# Patient Record
Sex: MALE | Race: WHITE | NOT HISPANIC OR LATINO | Employment: UNEMPLOYED | URBAN - METROPOLITAN AREA
[De-identification: names, ages, dates, MRNs, and addresses within clinical notes are randomized per-mention and may not be internally consistent; named-entity substitution may affect disease eponyms.]

---

## 2020-09-16 NOTE — PROGRESS NOTES
"  SUBJECTIVE:    Patient ID: Michael Hart is a 40 y.o. male.    Chief Complaint: Establish Care    HPI     New patient to my service    At age 17 this gentleman had Hodgkins lymphoma-removed (pericardium) lesion was 11 cm long and wrapped around the phrenic nerve -went to [place] and received treatment-with chemo and radiation-He has done very well since-Pt states he feels his health has been "ok" -he notes when exercising his "heart beats fast" and he becomes somewhat winded ever since his treatment-however, actually did well in 70 mile bike ride-( came out in front)    He is a - getting ready to do a [id number] mile sailing trip solo -needs evaluation  before leaving-    Patient is very active and regularly works out-and now he is working out in his home but less so since his GF  has been ill-     No visits with results within 6 Month(s) from this visit.   Latest known visit with results is:   No results found for any previous visit.       Past Medical History:   Diagnosis Date    Hodgkin lymphoma 1998    Hypothyroidism      Past Surgical History:   Procedure Laterality Date    RHINOPLASTY      TONSILLECTOMY      turmor removed from chest       Family History   Problem Relation Age of Onset    Heart disease Mother     No Known Problems Father     No Known Problems Sister     No Known Problems Brother        Marital Status: Single  Alcohol History:  reports current alcohol use of about 1.0 standard drinks of alcohol per week.  Tobacco History:  reports that he has never smoked. He has never used smokeless tobacco.  Drug History:  reports no history of drug use.    Review of patient's allergies indicates:   Allergen Reactions    Ciprofloxacin Hives and Itching       Current Outpatient Medications:     levothyroxine (SYNTHROID) 88 MCG tablet, Take 88 mcg by mouth once daily., Disp: , Rfl:     Review of Systems   Constitutional: Positive for activity change (usually works out in home) and " "appetite change. Negative for chills, diaphoresis, fatigue, fever and unexpected weight change.   HENT: Negative for congestion, ear pain, hearing loss, nosebleeds, postnasal drip, sinus pressure, sinus pain, sneezing, sore throat and trouble swallowing.    Eyes: Negative for photophobia, pain and visual disturbance.   Respiratory: Positive for shortness of breath (hard cycling). Negative for apnea, cough, choking, chest tightness and wheezing.    Cardiovascular: Negative for chest pain, palpitations and leg swelling.        Recently found aortic murmur   Gastrointestinal: Positive for rectal pain (hemorrhoids -treated-hx colon polyp). Negative for abdominal distention, abdominal pain, blood in stool, constipation, diarrhea, nausea and vomiting.   Endocrine: Negative for cold intolerance, heat intolerance, polydipsia and polyuria.   Genitourinary: Negative for difficulty urinating, dysuria, flank pain, frequency, hematuria and urgency.   Musculoskeletal: Negative for arthralgias, back pain, joint swelling, myalgias, neck pain and neck stiffness.   Skin: Positive for rash (ectopic allergy recently treated with 6 months prednisone). Negative for pallor.   Allergic/Immunologic: Positive for environmental allergies (poison ivy). Negative for food allergies.   Neurological: Negative for dizziness, tremors, seizures, syncope, speech difficulty, weakness, light-headedness, numbness and headaches.   Hematological: Does not bruise/bleed easily.   Psychiatric/Behavioral: Negative for agitation, confusion, decreased concentration, sleep disturbance and suicidal ideas. The patient is not nervous/anxious.           Objective:      Vitals:    09/17/20 1707   BP: 118/82   Pulse: 78   Resp: 16   Temp: 98.1 °F (36.7 °C)   SpO2: 98%   Weight: 96.6 kg (213 lb)   Height: 6' 2" (1.88 m)     Physical Exam  Vitals signs and nursing note reviewed.   Constitutional:       General: He is not in acute distress.     Appearance: Normal " appearance. He is well-developed and normal weight. He is not ill-appearing, toxic-appearing or diaphoretic.   HENT:      Head: Normocephalic and atraumatic.      Right Ear: External ear normal.      Left Ear: External ear normal.      Nose: Nose normal.      Mouth/Throat:      Pharynx: Uvula midline.   Eyes:      General: No scleral icterus.        Right eye: No discharge.         Left eye: No discharge.      Conjunctiva/sclera: Conjunctivae normal.      Pupils: Pupils are equal, round, and reactive to light.      Right eye: Pupil is round and reactive.      Left eye: Pupil is round and reactive.   Neck:      Musculoskeletal: Normal range of motion and neck supple. No neck rigidity or muscular tenderness.      Thyroid: No thyromegaly.      Vascular: No carotid bruit or JVD.   Cardiovascular:      Rate and Rhythm: Normal rate and regular rhythm.      Heart sounds: Murmur (1/6 murmur to the left of the sternal border that radiates minimally to the r clavicle but is loudest along the left LSB and heard to the apex) present. No friction rub. No gallop.    Pulmonary:      Effort: Pulmonary effort is normal. No respiratory distress.      Breath sounds: Normal breath sounds. No stridor. No wheezing, rhonchi or rales.   Abdominal:      General: Abdomen is flat. Bowel sounds are normal. There is no distension or abdominal bruit.      Palpations: Abdomen is soft. Abdomen is not rigid. There is no mass.      Tenderness: There is no abdominal tenderness. There is no right CVA tenderness, left CVA tenderness, guarding or rebound.      Hernia: No hernia is present.   Musculoskeletal: Normal range of motion.         General: No swelling, tenderness, deformity or signs of injury.      Right lower leg: No edema.      Left lower leg: No edema.   Lymphadenopathy:      Cervical: No cervical adenopathy.   Skin:     General: Skin is warm and dry.      Capillary Refill: Capillary refill takes less than 2 seconds.      Coloration: Skin is  not jaundiced or pale.      Findings: No bruising, erythema, lesion or rash.      Nails: There is no clubbing.     Neurological:      General: No focal deficit present.      Mental Status: He is alert and oriented to person, place, and time. Mental status is at baseline.      Cranial Nerves: No cranial nerve deficit.      Sensory: No sensory deficit.      Motor: No weakness.      Coordination: Coordination normal.      Gait: Gait normal.      Deep Tendon Reflexes: Reflexes normal.   Psychiatric:         Mood and Affect: Mood normal.         Speech: Speech normal.         Behavior: Behavior normal. Behavior is cooperative.         Thought Content: Thought content normal.         Judgment: Judgment normal.           Assessment:       1. Routine general medical examination at a health care facility    2. Aortic valve sclerosis    3. Need for hepatitis C screening test         Plan:       Routine general medical examination at a health care facility    Aortic valve sclerosis    Need for hepatitis C screening test      No follow-ups on file.        9/17/2020 Brittany Neves M.D.

## 2020-09-17 ENCOUNTER — OFFICE VISIT (OUTPATIENT)
Dept: FAMILY MEDICINE | Facility: CLINIC | Age: 40
End: 2020-09-17
Payer: COMMERCIAL

## 2020-09-17 VITALS
SYSTOLIC BLOOD PRESSURE: 118 MMHG | WEIGHT: 213 LBS | RESPIRATION RATE: 16 BRPM | BODY MASS INDEX: 27.34 KG/M2 | DIASTOLIC BLOOD PRESSURE: 82 MMHG | HEIGHT: 74 IN | OXYGEN SATURATION: 98 % | TEMPERATURE: 98 F | HEART RATE: 78 BPM

## 2020-09-17 DIAGNOSIS — Z00.00 ROUTINE GENERAL MEDICAL EXAMINATION AT A HEALTH CARE FACILITY: Primary | ICD-10-CM

## 2020-09-17 DIAGNOSIS — Z85.71 HISTORY OF HODGKIN'S LYMPHOMA: ICD-10-CM

## 2020-09-17 DIAGNOSIS — I35.8 AORTIC VALVE SCLEROSIS: ICD-10-CM

## 2020-09-17 DIAGNOSIS — R06.02 SHORTNESS OF BREATH: ICD-10-CM

## 2020-09-17 DIAGNOSIS — Z11.59 NEED FOR HEPATITIS C SCREENING TEST: ICD-10-CM

## 2020-09-17 DIAGNOSIS — E03.9 HYPOTHYROIDISM (ACQUIRED): ICD-10-CM

## 2020-09-17 PROCEDURE — 3008F BODY MASS INDEX DOCD: CPT | Mod: S$GLB,,, | Performed by: INTERNAL MEDICINE

## 2020-09-17 PROCEDURE — 99386 PR PREVENTIVE VISIT,NEW,40-64: ICD-10-PCS | Mod: S$GLB,,, | Performed by: INTERNAL MEDICINE

## 2020-09-17 PROCEDURE — 99386 PREV VISIT NEW AGE 40-64: CPT | Mod: S$GLB,,, | Performed by: INTERNAL MEDICINE

## 2020-09-17 PROCEDURE — 3008F PR BODY MASS INDEX (BMI) DOCUMENTED: ICD-10-PCS | Mod: S$GLB,,, | Performed by: INTERNAL MEDICINE

## 2020-09-17 RX ORDER — LEVOTHYROXINE SODIUM 88 UG/1
88 TABLET ORAL DAILY
COMMUNITY
Start: 2020-06-17 | End: 2021-10-26

## 2020-09-24 ENCOUNTER — TELEPHONE (OUTPATIENT)
Dept: CARDIOLOGY | Facility: CLINIC | Age: 40
End: 2020-09-24

## 2020-09-24 DIAGNOSIS — C81.90 HODGKIN LYMPHOMA, UNSPECIFIED HODGKIN LYMPHOMA TYPE, UNSPECIFIED BODY REGION: ICD-10-CM

## 2020-09-24 DIAGNOSIS — R07.9 CHEST PAIN: ICD-10-CM

## 2020-09-24 DIAGNOSIS — E03.9 HYPOTHYROIDISM, UNSPECIFIED TYPE: Primary | ICD-10-CM

## 2020-09-24 DIAGNOSIS — R53.83 FATIGUE, UNSPECIFIED TYPE: ICD-10-CM

## 2020-09-25 ENCOUNTER — LAB VISIT (OUTPATIENT)
Dept: LAB | Facility: HOSPITAL | Age: 40
End: 2020-09-25
Attending: SPECIALIST
Payer: COMMERCIAL

## 2020-09-25 DIAGNOSIS — E03.9 HYPOTHYROIDISM, UNSPECIFIED TYPE: ICD-10-CM

## 2020-09-25 DIAGNOSIS — R53.83 FATIGUE, UNSPECIFIED TYPE: ICD-10-CM

## 2020-09-25 DIAGNOSIS — C81.90 HODGKIN LYMPHOMA, UNSPECIFIED HODGKIN LYMPHOMA TYPE, UNSPECIFIED BODY REGION: ICD-10-CM

## 2020-09-25 LAB
ERYTHROCYTE [SEDIMENTATION RATE] IN BLOOD BY WESTERGREN METHOD: 5 MM/HR (ref 0–10)
T4 FREE SERPL-MCNC: 0.76 NG/DL (ref 0.71–1.51)

## 2020-09-25 PROCEDURE — 84439 ASSAY OF FREE THYROXINE: CPT

## 2020-09-25 PROCEDURE — 85651 RBC SED RATE NONAUTOMATED: CPT

## 2020-09-25 PROCEDURE — 36415 COLL VENOUS BLD VENIPUNCTURE: CPT

## 2020-09-29 ENCOUNTER — HOSPITAL ENCOUNTER (OUTPATIENT)
Dept: PULMONOLOGY | Facility: HOSPITAL | Age: 40
Discharge: HOME | End: 2020-09-29
Attending: INTERNAL MEDICINE
Payer: COMMERCIAL

## 2020-09-29 ENCOUNTER — HOSPITAL ENCOUNTER (OUTPATIENT)
Dept: RADIOLOGY | Facility: HOSPITAL | Age: 40
Discharge: HOME | End: 2020-09-29
Attending: INTERNAL MEDICINE
Payer: COMMERCIAL

## 2020-09-29 DIAGNOSIS — Z85.71 HISTORY OF HODGKIN'S LYMPHOMA: ICD-10-CM

## 2020-09-29 DIAGNOSIS — R06.02 SHORTNESS OF BREATH: ICD-10-CM

## 2020-09-29 PROCEDURE — 94729 DIFFUSING CAPACITY: CPT

## 2020-09-29 PROCEDURE — 94727 GAS DIL/WSHOT DETER LNG VOL: CPT

## 2020-09-29 PROCEDURE — 71046 X-RAY EXAM CHEST 2 VIEWS: CPT | Mod: TC

## 2020-09-29 PROCEDURE — 94010 BREATHING CAPACITY TEST: CPT

## 2020-09-30 ENCOUNTER — TELEPHONE (OUTPATIENT)
Dept: FAMILY MEDICINE | Facility: CLINIC | Age: 40
End: 2020-09-30

## 2020-09-30 NOTE — TELEPHONE ENCOUNTER
----- Message from Brittany Neves MD sent at 9/29/2020  9:38 PM CDT -----  Test results are normal for cxr

## 2020-10-29 ENCOUNTER — LAB VISIT (OUTPATIENT)
Dept: LAB | Facility: HOSPITAL | Age: 40
End: 2020-10-29
Attending: INTERNAL MEDICINE
Payer: COMMERCIAL

## 2020-10-29 DIAGNOSIS — E03.9 HYPOTHYROIDISM (ACQUIRED): ICD-10-CM

## 2020-10-29 DIAGNOSIS — Z11.59 NEED FOR HEPATITIS C SCREENING TEST: ICD-10-CM

## 2020-10-29 DIAGNOSIS — Z00.00 ROUTINE GENERAL MEDICAL EXAMINATION AT A HEALTH CARE FACILITY: ICD-10-CM

## 2020-10-29 LAB
ALBUMIN SERPL BCP-MCNC: 4.5 G/DL (ref 3.5–5.2)
ALP SERPL-CCNC: 44 U/L (ref 55–135)
ALT SERPL W/O P-5'-P-CCNC: 22 U/L (ref 10–44)
ANION GAP SERPL CALC-SCNC: 11 MMOL/L (ref 8–16)
AST SERPL-CCNC: 21 U/L (ref 10–40)
BASOPHILS # BLD AUTO: 0.05 K/UL (ref 0–0.2)
BASOPHILS NFR BLD: 1.1 % (ref 0–1.9)
BILIRUB SERPL-MCNC: 0.6 MG/DL (ref 0.1–1)
BILIRUB UR QL STRIP: NEGATIVE
BUN SERPL-MCNC: 9 MG/DL (ref 6–20)
CALCIUM SERPL-MCNC: 9.8 MG/DL (ref 8.7–10.5)
CHLORIDE SERPL-SCNC: 101 MMOL/L (ref 95–110)
CHOLEST SERPL-MCNC: 204 MG/DL (ref 120–199)
CHOLEST/HDLC SERPL: 4.3 {RATIO} (ref 2–5)
CLARITY UR: CLEAR
CO2 SERPL-SCNC: 27 MMOL/L (ref 23–29)
COLOR UR: YELLOW
CREAT SERPL-MCNC: 0.9 MG/DL (ref 0.5–1.4)
DIFFERENTIAL METHOD: ABNORMAL
EOSINOPHIL # BLD AUTO: 0.1 K/UL (ref 0–0.5)
EOSINOPHIL NFR BLD: 2.4 % (ref 0–8)
ERYTHROCYTE [DISTWIDTH] IN BLOOD BY AUTOMATED COUNT: 11.9 % (ref 11.5–14.5)
EST. GFR  (AFRICAN AMERICAN): >60 ML/MIN/1.73 M^2
EST. GFR  (NON AFRICAN AMERICAN): >60 ML/MIN/1.73 M^2
GLUCOSE SERPL-MCNC: 99 MG/DL (ref 70–110)
GLUCOSE UR QL STRIP: NEGATIVE
HCT VFR BLD AUTO: 45 % (ref 40–54)
HDLC SERPL-MCNC: 48 MG/DL (ref 40–75)
HDLC SERPL: 23.5 % (ref 20–50)
HGB BLD-MCNC: 15.6 G/DL (ref 14–18)
HGB UR QL STRIP: NEGATIVE
IMM GRANULOCYTES # BLD AUTO: 0.01 K/UL (ref 0–0.04)
IMM GRANULOCYTES NFR BLD AUTO: 0.2 % (ref 0–0.5)
KETONES UR QL STRIP: NEGATIVE
LDLC SERPL CALC-MCNC: 126.8 MG/DL (ref 63–159)
LEUKOCYTE ESTERASE UR QL STRIP: NEGATIVE
LYMPHOCYTES # BLD AUTO: 1.3 K/UL (ref 1–4.8)
LYMPHOCYTES NFR BLD: 27.9 % (ref 18–48)
MCH RBC QN AUTO: 31.4 PG (ref 27–31)
MCHC RBC AUTO-ENTMCNC: 34.7 G/DL (ref 32–36)
MCV RBC AUTO: 91 FL (ref 82–98)
MONOCYTES # BLD AUTO: 0.3 K/UL (ref 0.3–1)
MONOCYTES NFR BLD: 7.3 % (ref 4–15)
NEUTROPHILS # BLD AUTO: 2.8 K/UL (ref 1.8–7.7)
NEUTROPHILS NFR BLD: 61.1 % (ref 38–73)
NITRITE UR QL STRIP: NEGATIVE
NONHDLC SERPL-MCNC: 156 MG/DL
NRBC BLD-RTO: 0 /100 WBC
PH UR STRIP: 6 [PH] (ref 5–8)
PLATELET # BLD AUTO: 221 K/UL (ref 150–350)
PMV BLD AUTO: 10.3 FL (ref 9.2–12.9)
POTASSIUM SERPL-SCNC: 4.8 MMOL/L (ref 3.5–5.1)
PROT SERPL-MCNC: 6.9 G/DL (ref 6–8.4)
PROT UR QL STRIP: ABNORMAL
RBC # BLD AUTO: 4.97 M/UL (ref 4.6–6.2)
SODIUM SERPL-SCNC: 139 MMOL/L (ref 136–145)
SP GR UR STRIP: 1.02 (ref 1–1.03)
TRIGL SERPL-MCNC: 146 MG/DL (ref 30–150)
TSH SERPL DL<=0.005 MIU/L-ACNC: 2.31 UIU/ML (ref 0.34–5.6)
URN SPEC COLLECT METH UR: ABNORMAL
UROBILINOGEN UR STRIP-ACNC: NEGATIVE EU/DL
WBC # BLD AUTO: 4.52 K/UL (ref 3.9–12.7)

## 2020-10-29 PROCEDURE — 81003 URINALYSIS AUTO W/O SCOPE: CPT

## 2020-10-29 PROCEDURE — 36415 COLL VENOUS BLD VENIPUNCTURE: CPT

## 2020-10-29 PROCEDURE — 80053 COMPREHEN METABOLIC PANEL: CPT

## 2020-10-29 PROCEDURE — 80061 LIPID PANEL: CPT

## 2020-10-29 PROCEDURE — 84443 ASSAY THYROID STIM HORMONE: CPT

## 2020-10-29 PROCEDURE — 85025 COMPLETE CBC W/AUTO DIFF WBC: CPT

## 2020-11-03 ENCOUNTER — TELEPHONE (OUTPATIENT)
Dept: FAMILY MEDICINE | Facility: CLINIC | Age: 40
End: 2020-11-03

## 2020-11-03 NOTE — TELEPHONE ENCOUNTER
----- Message from Brittany Neves MD sent at 11/1/2020  9:41 PM CST -----  .Lipids reveal elevated LDL-for now omega 3 fish oil 3 grams a day should help lower this plus low fat  Diet-other tests look good-

## 2020-11-04 NOTE — TELEPHONE ENCOUNTER
Spoke with patient regarding labs and advised him to begin taking 3 gram of fish oil daily per Dr. Neves.

## 2021-10-18 ENCOUNTER — TELEPHONE (OUTPATIENT)
Dept: HEMATOLOGY/ONCOLOGY | Facility: CLINIC | Age: 41
End: 2021-10-18

## 2021-10-18 ENCOUNTER — OFFICE VISIT (OUTPATIENT)
Dept: HEMATOLOGY/ONCOLOGY | Facility: CLINIC | Age: 41
End: 2021-10-18
Payer: MEDICAID

## 2021-10-18 VITALS
DIASTOLIC BLOOD PRESSURE: 91 MMHG | WEIGHT: 209 LBS | HEIGHT: 74 IN | BODY MASS INDEX: 26.82 KG/M2 | SYSTOLIC BLOOD PRESSURE: 129 MMHG | HEART RATE: 96 BPM | RESPIRATION RATE: 18 BRPM

## 2021-10-18 DIAGNOSIS — R59.0 INGUINAL LYMPHADENOPATHY: Primary | ICD-10-CM

## 2021-10-18 DIAGNOSIS — C81.92 HODGKIN LYMPHOMA OF INTRATHORACIC LYMPH NODES, UNSPECIFIED HODGKIN LYMPHOMA TYPE: ICD-10-CM

## 2021-10-18 PROCEDURE — 99204 PR OFFICE/OUTPT VISIT, NEW, LEVL IV, 45-59 MIN: ICD-10-PCS | Mod: S$GLB,,, | Performed by: INTERNAL MEDICINE

## 2021-10-18 PROCEDURE — 99204 OFFICE O/P NEW MOD 45 MIN: CPT | Mod: S$GLB,,, | Performed by: INTERNAL MEDICINE

## 2021-10-20 ENCOUNTER — TELEPHONE (OUTPATIENT)
Dept: HEMATOLOGY/ONCOLOGY | Facility: CLINIC | Age: 41
End: 2021-10-20

## 2021-10-20 ENCOUNTER — TELEPHONE (OUTPATIENT)
Dept: SURGERY | Facility: CLINIC | Age: 41
End: 2021-10-20

## 2021-10-20 DIAGNOSIS — R59.0 INGUINAL LYMPHADENOPATHY: Primary | ICD-10-CM

## 2021-10-21 ENCOUNTER — OFFICE VISIT (OUTPATIENT)
Dept: SURGERY | Facility: CLINIC | Age: 41
End: 2021-10-21
Payer: MEDICAID

## 2021-10-21 ENCOUNTER — HOSPITAL ENCOUNTER (OUTPATIENT)
Dept: PREADMISSION TESTING | Facility: HOSPITAL | Age: 41
Discharge: HOME | End: 2021-10-21
Attending: INTERNAL MEDICINE
Payer: MEDICAID

## 2021-10-21 VITALS
RESPIRATION RATE: 18 BRPM | HEART RATE: 60 BPM | OXYGEN SATURATION: 99 % | TEMPERATURE: 97 F | SYSTOLIC BLOOD PRESSURE: 120 MMHG | HEIGHT: 74 IN | WEIGHT: 209 LBS | BODY MASS INDEX: 26.82 KG/M2 | DIASTOLIC BLOOD PRESSURE: 76 MMHG

## 2021-10-21 VITALS
WEIGHT: 209 LBS | RESPIRATION RATE: 16 BRPM | SYSTOLIC BLOOD PRESSURE: 125 MMHG | HEIGHT: 74 IN | TEMPERATURE: 98 F | BODY MASS INDEX: 26.82 KG/M2 | DIASTOLIC BLOOD PRESSURE: 81 MMHG | HEART RATE: 72 BPM

## 2021-10-21 DIAGNOSIS — R59.9 LYMPH NODE ENLARGEMENT: Primary | ICD-10-CM

## 2021-10-21 DIAGNOSIS — Z01.818 PRE-OP TESTING: Primary | ICD-10-CM

## 2021-10-21 LAB — SARS-COV-2 RDRP RESP QL NAA+PROBE: NEGATIVE

## 2021-10-21 PROCEDURE — 99203 PR OFFICE/OUTPT VISIT, NEW, LEVL III, 30-44 MIN: ICD-10-PCS | Mod: S$GLB,,, | Performed by: PHYSICIAN ASSISTANT

## 2021-10-21 PROCEDURE — 93010 ELECTROCARDIOGRAM REPORT: CPT | Mod: ,,, | Performed by: SPECIALIST

## 2021-10-21 PROCEDURE — 93010 EKG 12-LEAD: ICD-10-PCS | Mod: ,,, | Performed by: SPECIALIST

## 2021-10-21 PROCEDURE — 99203 OFFICE O/P NEW LOW 30 MIN: CPT | Mod: S$GLB,,, | Performed by: PHYSICIAN ASSISTANT

## 2021-10-21 PROCEDURE — U0002 COVID-19 LAB TEST NON-CDC: HCPCS | Performed by: INTERNAL MEDICINE

## 2021-10-21 PROCEDURE — 93005 ELECTROCARDIOGRAM TRACING: CPT | Performed by: SPECIALIST

## 2021-10-21 RX ORDER — SODIUM CHLORIDE 9 MG/ML
INJECTION, SOLUTION INTRAVENOUS CONTINUOUS
Status: CANCELLED | OUTPATIENT
Start: 2021-10-22

## 2021-10-22 ENCOUNTER — ANESTHESIA EVENT (OUTPATIENT)
Dept: SURGERY | Facility: HOSPITAL | Age: 41
End: 2021-10-22
Payer: MEDICAID

## 2021-10-22 ENCOUNTER — HOSPITAL ENCOUNTER (OUTPATIENT)
Facility: HOSPITAL | Age: 41
Discharge: HOME | End: 2021-10-22
Attending: SURGERY | Admitting: SURGERY
Payer: MEDICAID

## 2021-10-22 ENCOUNTER — ANESTHESIA (OUTPATIENT)
Dept: SURGERY | Facility: HOSPITAL | Age: 41
End: 2021-10-22
Payer: MEDICAID

## 2021-10-22 VITALS
TEMPERATURE: 98 F | RESPIRATION RATE: 17 BRPM | OXYGEN SATURATION: 99 % | DIASTOLIC BLOOD PRESSURE: 74 MMHG | HEART RATE: 68 BPM | SYSTOLIC BLOOD PRESSURE: 120 MMHG

## 2021-10-22 DIAGNOSIS — R59.9 LYMPH NODE ENLARGEMENT: Primary | ICD-10-CM

## 2021-10-22 PROCEDURE — 36000706: Performed by: SURGERY

## 2021-10-22 PROCEDURE — 27201423 OPTIME MED/SURG SUP & DEVICES STERILE SUPPLY: Performed by: SURGERY

## 2021-10-22 PROCEDURE — 38531 OPEN BX/EXC INGUINOFEM NODES: CPT | Mod: RT,,, | Performed by: SURGERY

## 2021-10-22 PROCEDURE — 63600175 PHARM REV CODE 636 W HCPCS: Performed by: NURSE ANESTHETIST, CERTIFIED REGISTERED

## 2021-10-22 PROCEDURE — 63600175 PHARM REV CODE 636 W HCPCS: Performed by: SURGERY

## 2021-10-22 PROCEDURE — 25000003 PHARM REV CODE 250: Performed by: SURGERY

## 2021-10-22 PROCEDURE — 38531 PR BIOPSY/EXCISION, INGUINOFEMORAL NODE(S), OPEN: ICD-10-PCS | Mod: RT,,, | Performed by: SURGERY

## 2021-10-22 PROCEDURE — 27000671 HC TUBING MICROBORE EXT: Performed by: ANESTHESIOLOGY

## 2021-10-22 PROCEDURE — 00400 ANES INTEGUMENTARY SYS NOS: CPT | Performed by: SURGERY

## 2021-10-22 PROCEDURE — 71000033 HC RECOVERY, INTIAL HOUR: Performed by: SURGERY

## 2021-10-22 PROCEDURE — 27000673 HC TUBING BLOOD Y: Performed by: ANESTHESIOLOGY

## 2021-10-22 PROCEDURE — 27202105 HC BIS BILATERAL SENSOR: Performed by: ANESTHESIOLOGY

## 2021-10-22 PROCEDURE — C9290 INJ, BUPIVACAINE LIPOSOME: HCPCS | Performed by: SURGERY

## 2021-10-22 PROCEDURE — 36000707: Performed by: SURGERY

## 2021-10-22 PROCEDURE — 25000003 PHARM REV CODE 250: Performed by: NURSE ANESTHETIST, CERTIFIED REGISTERED

## 2021-10-22 PROCEDURE — 27200651 HC AIRWAY, LMA: Performed by: ANESTHESIOLOGY

## 2021-10-22 PROCEDURE — 37000008 HC ANESTHESIA 1ST 15 MINUTES: Performed by: SURGERY

## 2021-10-22 PROCEDURE — 27000653 HC CATH, IV CATHLN: Performed by: ANESTHESIOLOGY

## 2021-10-22 PROCEDURE — 71000015 HC POSTOP RECOV 1ST HR: Performed by: SURGERY

## 2021-10-22 PROCEDURE — 27202103: Performed by: ANESTHESIOLOGY

## 2021-10-22 PROCEDURE — 37000009 HC ANESTHESIA EA ADD 15 MINS: Performed by: SURGERY

## 2021-10-22 RX ORDER — SODIUM CHLORIDE 9 MG/ML
INJECTION, SOLUTION INTRAVENOUS CONTINUOUS
Status: DISCONTINUED | OUTPATIENT
Start: 2021-10-22 | End: 2021-10-26 | Stop reason: HOSPADM

## 2021-10-22 RX ORDER — FAMOTIDINE 10 MG/ML
INJECTION INTRAVENOUS
Status: DISCONTINUED | OUTPATIENT
Start: 2021-10-22 | End: 2021-10-22

## 2021-10-22 RX ORDER — MIDAZOLAM HYDROCHLORIDE 1 MG/ML
INJECTION INTRAMUSCULAR; INTRAVENOUS
Status: DISCONTINUED | OUTPATIENT
Start: 2021-10-22 | End: 2021-10-22

## 2021-10-22 RX ORDER — HYDROCODONE BITARTRATE AND ACETAMINOPHEN 5; 325 MG/1; MG/1
1 TABLET ORAL EVERY 6 HOURS PRN
Qty: 25 TABLET | Refills: 0 | Status: SHIPPED | OUTPATIENT
Start: 2021-10-22 | End: 2024-01-11

## 2021-10-22 RX ORDER — ONDANSETRON 2 MG/ML
4 INJECTION INTRAMUSCULAR; INTRAVENOUS DAILY PRN
Status: DISCONTINUED | OUTPATIENT
Start: 2021-10-22 | End: 2021-10-26 | Stop reason: HOSPADM

## 2021-10-22 RX ORDER — ONDANSETRON 4 MG/1
4 TABLET, ORALLY DISINTEGRATING ORAL ONCE
Status: DISCONTINUED | OUTPATIENT
Start: 2021-10-22 | End: 2021-10-26 | Stop reason: HOSPADM

## 2021-10-22 RX ORDER — OXYCODONE HYDROCHLORIDE 5 MG/1
5 TABLET ORAL EVERY 4 HOURS PRN
Status: DISCONTINUED | OUTPATIENT
Start: 2021-10-22 | End: 2021-10-26 | Stop reason: HOSPADM

## 2021-10-22 RX ORDER — SODIUM CHLORIDE, SODIUM LACTATE, POTASSIUM CHLORIDE, CALCIUM CHLORIDE 600; 310; 30; 20 MG/100ML; MG/100ML; MG/100ML; MG/100ML
INJECTION, SOLUTION INTRAVENOUS CONTINUOUS PRN
Status: DISCONTINUED | OUTPATIENT
Start: 2021-10-22 | End: 2021-10-22

## 2021-10-22 RX ORDER — LIDOCAINE HCL/PF 100 MG/5ML
SYRINGE (ML) INTRAVENOUS
Status: DISCONTINUED | OUTPATIENT
Start: 2021-10-22 | End: 2021-10-22

## 2021-10-22 RX ORDER — DIPHENHYDRAMINE HYDROCHLORIDE 50 MG/ML
INJECTION INTRAMUSCULAR; INTRAVENOUS
Status: DISCONTINUED | OUTPATIENT
Start: 2021-10-22 | End: 2021-10-22

## 2021-10-22 RX ORDER — DIPHENHYDRAMINE HYDROCHLORIDE 50 MG/ML
12.5 INJECTION INTRAMUSCULAR; INTRAVENOUS
Status: DISCONTINUED | OUTPATIENT
Start: 2021-10-22 | End: 2021-10-26 | Stop reason: HOSPADM

## 2021-10-22 RX ORDER — PHENYLEPHRINE HYDROCHLORIDE 10 MG/ML
INJECTION INTRAVENOUS
Status: DISCONTINUED | OUTPATIENT
Start: 2021-10-22 | End: 2021-10-22

## 2021-10-22 RX ORDER — SODIUM CHLORIDE 0.9 % (FLUSH) 0.9 %
10 SYRINGE (ML) INJECTION
Status: DISCONTINUED | OUTPATIENT
Start: 2021-10-22 | End: 2021-10-26 | Stop reason: HOSPADM

## 2021-10-22 RX ORDER — MEPERIDINE HYDROCHLORIDE 50 MG/ML
12.5 INJECTION INTRAMUSCULAR; INTRAVENOUS; SUBCUTANEOUS EVERY 10 MIN PRN
Status: DISCONTINUED | OUTPATIENT
Start: 2021-10-22 | End: 2021-10-22 | Stop reason: HOSPADM

## 2021-10-22 RX ORDER — ACETAMINOPHEN 10 MG/ML
INJECTION, SOLUTION INTRAVENOUS
Status: DISCONTINUED | OUTPATIENT
Start: 2021-10-22 | End: 2021-10-22

## 2021-10-22 RX ORDER — ONDANSETRON 2 MG/ML
INJECTION INTRAMUSCULAR; INTRAVENOUS
Status: DISCONTINUED | OUTPATIENT
Start: 2021-10-22 | End: 2021-10-22

## 2021-10-22 RX ORDER — DEXAMETHASONE SODIUM PHOSPHATE 4 MG/ML
INJECTION, SOLUTION INTRA-ARTICULAR; INTRALESIONAL; INTRAMUSCULAR; INTRAVENOUS; SOFT TISSUE
Status: DISCONTINUED | OUTPATIENT
Start: 2021-10-22 | End: 2021-10-22

## 2021-10-22 RX ORDER — FENTANYL CITRATE 50 UG/ML
INJECTION, SOLUTION INTRAMUSCULAR; INTRAVENOUS
Status: DISCONTINUED | OUTPATIENT
Start: 2021-10-22 | End: 2021-10-22

## 2021-10-22 RX ORDER — OXYCODONE HYDROCHLORIDE 5 MG/1
5 TABLET ORAL
Status: DISCONTINUED | OUTPATIENT
Start: 2021-10-22 | End: 2021-10-26 | Stop reason: HOSPADM

## 2021-10-22 RX ORDER — CEFAZOLIN SODIUM 2 G/50ML
2 SOLUTION INTRAVENOUS
Status: DISCONTINUED | OUTPATIENT
Start: 2021-10-22 | End: 2021-10-26 | Stop reason: HOSPADM

## 2021-10-22 RX ORDER — PROPOFOL 10 MG/ML
INJECTION, EMULSION INTRAVENOUS
Status: DISCONTINUED | OUTPATIENT
Start: 2021-10-22 | End: 2021-10-22

## 2021-10-22 RX ORDER — BUPIVACAINE HYDROCHLORIDE 2.5 MG/ML
INJECTION, SOLUTION EPIDURAL; INFILTRATION; INTRACAUDAL
Status: DISCONTINUED | OUTPATIENT
Start: 2021-10-22 | End: 2021-10-22 | Stop reason: HOSPADM

## 2021-10-22 RX ORDER — HYDROMORPHONE HYDROCHLORIDE 1 MG/ML
0.2 INJECTION, SOLUTION INTRAMUSCULAR; INTRAVENOUS; SUBCUTANEOUS
Status: DISCONTINUED | OUTPATIENT
Start: 2021-10-22 | End: 2021-10-26 | Stop reason: HOSPADM

## 2021-10-22 RX ORDER — CEFAZOLIN SODIUM 1 G/3ML
INJECTION, POWDER, FOR SOLUTION INTRAMUSCULAR; INTRAVENOUS
Status: DISCONTINUED | OUTPATIENT
Start: 2021-10-22 | End: 2021-10-22

## 2021-10-22 RX ADMIN — SODIUM CHLORIDE, SODIUM LACTATE, POTASSIUM CHLORIDE, AND CALCIUM CHLORIDE: .6; .31; .03; .02 INJECTION, SOLUTION INTRAVENOUS at 06:10

## 2021-10-22 RX ADMIN — ONDANSETRON 4 MG: 2 INJECTION INTRAMUSCULAR; INTRAVENOUS at 07:10

## 2021-10-22 RX ADMIN — FENTANYL CITRATE 50 MCG: 50 INJECTION INTRAMUSCULAR; INTRAVENOUS at 07:10

## 2021-10-22 RX ADMIN — FAMOTIDINE 20 MG: 10 INJECTION, SOLUTION INTRAVENOUS at 06:10

## 2021-10-22 RX ADMIN — LIDOCAINE HYDROCHLORIDE 100 MG: 20 INJECTION, SOLUTION INTRAVENOUS at 07:10

## 2021-10-22 RX ADMIN — ACETAMINOPHEN 1000 MG: 10 INJECTION, SOLUTION INTRAVENOUS at 07:10

## 2021-10-22 RX ADMIN — MIDAZOLAM HYDROCHLORIDE 2 MG: 1 INJECTION, SOLUTION INTRAMUSCULAR; INTRAVENOUS at 07:10

## 2021-10-22 RX ADMIN — DEXAMETHASONE SODIUM PHOSPHATE 8 MG: 4 INJECTION, SOLUTION INTRAMUSCULAR; INTRAVENOUS at 07:10

## 2021-10-22 RX ADMIN — PHENYLEPHRINE HYDROCHLORIDE 50 MCG: 10 INJECTION INTRAVENOUS at 07:10

## 2021-10-22 RX ADMIN — CEFAZOLIN 2 G: 330 INJECTION, POWDER, FOR SOLUTION INTRAMUSCULAR; INTRAVENOUS at 07:10

## 2021-10-22 RX ADMIN — SODIUM CHLORIDE, SODIUM LACTATE, POTASSIUM CHLORIDE, AND CALCIUM CHLORIDE: .6; .31; .03; .02 INJECTION, SOLUTION INTRAVENOUS at 08:10

## 2021-10-22 RX ADMIN — PROPOFOL 100 MG: 10 INJECTION, EMULSION INTRAVENOUS at 07:10

## 2021-10-22 RX ADMIN — DIPHENHYDRAMINE HYDROCHLORIDE 6.25 MG: 50 INJECTION, SOLUTION INTRAMUSCULAR; INTRAVENOUS at 07:10

## 2021-10-25 DIAGNOSIS — N39.0 URINARY TRACT INFECTION WITHOUT HEMATURIA, SITE UNSPECIFIED: ICD-10-CM

## 2021-10-25 DIAGNOSIS — E78.1 HYPERTRIGLYCERIDEMIA: Primary | ICD-10-CM

## 2021-10-25 DIAGNOSIS — R30.0 DYSURIA: ICD-10-CM

## 2021-10-25 DIAGNOSIS — I35.8 AORTIC VALVE SCLEROSIS: ICD-10-CM

## 2021-10-26 ENCOUNTER — TELEPHONE (OUTPATIENT)
Dept: HEMATOLOGY/ONCOLOGY | Facility: CLINIC | Age: 41
End: 2021-10-26
Payer: MEDICAID

## 2021-10-26 ENCOUNTER — HOSPITAL ENCOUNTER (OUTPATIENT)
Dept: CARDIOLOGY | Facility: CLINIC | Age: 41
Discharge: HOME | End: 2021-10-26
Payer: MEDICAID

## 2021-10-26 ENCOUNTER — OFFICE VISIT (OUTPATIENT)
Dept: SURGERY | Facility: CLINIC | Age: 41
End: 2021-10-26
Payer: MEDICAID

## 2021-10-26 VITALS
TEMPERATURE: 98 F | WEIGHT: 209 LBS | BODY MASS INDEX: 26.82 KG/M2 | HEIGHT: 74 IN | HEIGHT: 74 IN | BODY MASS INDEX: 26.82 KG/M2 | WEIGHT: 209 LBS

## 2021-10-26 DIAGNOSIS — I35.8 AORTIC VALVE SCLEROSIS: ICD-10-CM

## 2021-10-26 DIAGNOSIS — K64.5 THROMBOSED EXTERNAL HEMORRHOID: Primary | ICD-10-CM

## 2021-10-26 LAB
AORTIC ROOT ANNULUS: 3.4 CM
AORTIC VALVE CUSP SEPERATION: 1.1 CM
AV INDEX (PROSTH): 0.42
AV MEAN GRADIENT: 12 MMHG
AV PEAK GRADIENT: 22 MMHG
AV REGURGITATION PRESSURE HALF TIME: 602 MS
AV VALVE AREA: 1.45 CM2
AV VELOCITY RATIO: 0.38
BSA FOR ECHO PROCEDURE: 2.22 M2
CV ECHO LV RWT: 0.42 CM
DOP CALC AO PEAK VEL: 2.37 M/S
DOP CALC AO VTI: 46.2 CM
DOP CALC LVOT AREA: 3.5 CM2
DOP CALC LVOT DIAMETER: 2.1 CM
DOP CALC LVOT PEAK VEL: 0.91 M/S
DOP CALC LVOT STROKE VOLUME: 67.16 CM3
DOP CALCLVOT PEAK VEL VTI: 19.4 CM
E WAVE DECELERATION TIME: 237 MS
E/A RATIO: 0.72
E/E' RATIO: 9.18 M/S
ECHO LV POSTERIOR WALL: 0.97 CM (ref 0.6–1.1)
EJECTION FRACTION: 60 %
FRACTIONAL SHORTENING: 32 % (ref 28–44)
INTERVENTRICULAR SEPTUM: 1.2 CM (ref 0.6–1.1)
IVRT: 100 MS
LA MAJOR: 3.7 CM
LEFT ATRIUM SIZE: 3.4 CM
LEFT INTERNAL DIMENSION IN SYSTOLE: 3.15 CM (ref 2.1–4)
LEFT VENTRICLE MASS INDEX: 80 G/M2
LEFT VENTRICULAR INTERNAL DIMENSION IN DIASTOLE: 4.6 CM (ref 3.5–6)
LEFT VENTRICULAR MASS: 177.78 G
LV LATERAL E/E' RATIO: 8.67 M/S
LV SEPTAL E/E' RATIO: 9.75 M/S
MV PEAK A VEL: 1.08 M/S
MV PEAK E VEL: 0.78 M/S
PISA TR MAX VEL: 2.23 M/S
PV MEAN GRADIENT: 5 MMHG
PV PEAK VELOCITY: 1.57 M/S
RA PRESSURE: 3 MMHG
RIGHT VENTRICULAR END-DIASTOLIC DIMENSION: 2.25 CM
TDI LATERAL: 0.09 M/S
TDI SEPTAL: 0.08 M/S
TDI: 0.09 M/S
TR MAX PG: 20 MMHG
TV REST PULMONARY ARTERY PRESSURE: 23 MMHG

## 2021-10-26 PROCEDURE — 46083 INC THROMBOSED HROID XTRNL: CPT | Mod: 79,S$GLB,, | Performed by: PHYSICIAN ASSISTANT

## 2021-10-26 PROCEDURE — 99212 PR OFFICE/OUTPT VISIT, EST, LEVL II, 10-19 MIN: ICD-10-PCS | Mod: 25,S$GLB,, | Performed by: PHYSICIAN ASSISTANT

## 2021-10-26 PROCEDURE — 93306 ECHO (CUPID ONLY): ICD-10-PCS | Mod: S$GLB,,, | Performed by: SPECIALIST

## 2021-10-26 PROCEDURE — 93306 TTE W/DOPPLER COMPLETE: CPT | Mod: S$GLB,,, | Performed by: SPECIALIST

## 2021-10-26 PROCEDURE — 99212 OFFICE O/P EST SF 10 MIN: CPT | Mod: 25,S$GLB,, | Performed by: PHYSICIAN ASSISTANT

## 2021-10-26 PROCEDURE — 46083 PR INCISE EXTERNAL HEMORRHOID: ICD-10-PCS | Mod: 79,S$GLB,, | Performed by: PHYSICIAN ASSISTANT

## 2021-10-26 RX ORDER — LEVOTHYROXINE SODIUM 100 UG/1
TABLET ORAL
COMMUNITY
Start: 2021-10-24 | End: 2023-07-10

## 2021-10-28 ENCOUNTER — HOSPITAL ENCOUNTER (OUTPATIENT)
Dept: RADIOLOGY | Facility: HOSPITAL | Age: 41
Discharge: HOME | End: 2021-10-28
Attending: INTERNAL MEDICINE
Payer: MEDICAID

## 2021-10-28 ENCOUNTER — OFFICE VISIT (OUTPATIENT)
Dept: SURGERY | Facility: CLINIC | Age: 41
End: 2021-10-28
Payer: MEDICAID

## 2021-10-28 VITALS
WEIGHT: 206 LBS | DIASTOLIC BLOOD PRESSURE: 84 MMHG | HEIGHT: 74 IN | BODY MASS INDEX: 26.44 KG/M2 | SYSTOLIC BLOOD PRESSURE: 130 MMHG

## 2021-10-28 DIAGNOSIS — K64.5 THROMBOSED EXTERNAL HEMORRHOID: Primary | ICD-10-CM

## 2021-10-28 LAB — GLUCOSE SERPL-MCNC: 111 MG/DL (ref 70–110)

## 2021-10-28 PROCEDURE — 99024 PR POST-OP FOLLOW-UP VISIT: ICD-10-PCS | Mod: S$GLB,,, | Performed by: SURGERY

## 2021-10-28 PROCEDURE — 78815 PET IMAGE W/CT SKULL-THIGH: CPT | Mod: TC,PO

## 2021-10-28 PROCEDURE — 99024 POSTOP FOLLOW-UP VISIT: CPT | Mod: S$GLB,,, | Performed by: SURGERY

## 2021-10-28 RX ORDER — HYDROCORTISONE ACETATE 25 MG/1
25 SUPPOSITORY RECTAL 2 TIMES DAILY
COMMUNITY
Start: 2021-10-24

## 2021-10-29 ENCOUNTER — NURSE TRIAGE (OUTPATIENT)
Dept: ADMINISTRATIVE | Facility: CLINIC | Age: 41
End: 2021-10-29
Payer: MEDICAID

## 2022-04-26 ENCOUNTER — TELEPHONE (OUTPATIENT)
Dept: CARDIOLOGY | Facility: CLINIC | Age: 42
End: 2022-04-26
Payer: MEDICAID

## 2022-04-26 DIAGNOSIS — C81.90 HODGKIN LYMPHOMA, UNSPECIFIED HODGKIN LYMPHOMA TYPE, UNSPECIFIED BODY REGION: ICD-10-CM

## 2022-04-26 DIAGNOSIS — E03.9 PRIMARY HYPOTHYROIDISM: Primary | ICD-10-CM

## 2022-04-26 DIAGNOSIS — E78.1 HYPERTRIGLYCERIDEMIA: ICD-10-CM

## 2022-05-07 LAB
ALBUMIN SERPL-MCNC: 4.3 G/DL (ref 3.6–5.1)
ALBUMIN/GLOB SERPL: 2.2 (CALC) (ref 1–2.5)
ALP SERPL-CCNC: 55 U/L (ref 36–130)
ALT SERPL-CCNC: 16 U/L (ref 9–46)
AST SERPL-CCNC: 15 U/L (ref 10–40)
BASOPHILS # BLD AUTO: 41 CELLS/UL (ref 0–200)
BASOPHILS NFR BLD AUTO: 0.8 %
BILIRUB SERPL-MCNC: 0.7 MG/DL (ref 0.2–1.2)
BUN SERPL-MCNC: 9 MG/DL (ref 7–25)
BUN/CREAT SERPL: NORMAL (CALC) (ref 6–22)
CALCIUM SERPL-MCNC: 9.4 MG/DL (ref 8.6–10.3)
CHLORIDE SERPL-SCNC: 102 MMOL/L (ref 98–110)
CHOLEST SERPL-MCNC: 198 MG/DL
CHOLEST/HDLC SERPL: 4 (CALC)
CO2 SERPL-SCNC: 31 MMOL/L (ref 20–32)
CREAT SERPL-MCNC: 0.92 MG/DL (ref 0.6–1.35)
EOSINOPHIL # BLD AUTO: 92 CELLS/UL (ref 15–500)
EOSINOPHIL NFR BLD AUTO: 1.8 %
ERYTHROCYTE [DISTWIDTH] IN BLOOD BY AUTOMATED COUNT: 11.8 % (ref 11–15)
GLOBULIN SER CALC-MCNC: 2 G/DL (CALC) (ref 1.9–3.7)
GLUCOSE SERPL-MCNC: 85 MG/DL (ref 65–99)
HCT VFR BLD AUTO: 46.7 % (ref 38.5–50)
HDLC SERPL-MCNC: 50 MG/DL
HGB BLD-MCNC: 15.7 G/DL (ref 13.2–17.1)
LDLC SERPL CALC-MCNC: 126 MG/DL (CALC)
LYMPHOCYTES # BLD AUTO: 1153 CELLS/UL (ref 850–3900)
LYMPHOCYTES NFR BLD AUTO: 22.6 %
MCH RBC QN AUTO: 30.7 PG (ref 27–33)
MCHC RBC AUTO-ENTMCNC: 33.6 G/DL (ref 32–36)
MCV RBC AUTO: 91.2 FL (ref 80–100)
MONOCYTES # BLD AUTO: 316 CELLS/UL (ref 200–950)
MONOCYTES NFR BLD AUTO: 6.2 %
NEUTROPHILS # BLD AUTO: 3499 CELLS/UL (ref 1500–7800)
NEUTROPHILS NFR BLD AUTO: 68.6 %
NONHDLC SERPL-MCNC: 148 MG/DL (CALC)
PLATELET # BLD AUTO: 209 THOUSAND/UL (ref 140–400)
PMV BLD REES-ECKER: 10.5 FL (ref 7.5–12.5)
POTASSIUM SERPL-SCNC: 4.7 MMOL/L (ref 3.5–5.3)
PROT SERPL-MCNC: 6.3 G/DL (ref 6.1–8.1)
RBC # BLD AUTO: 5.12 MILLION/UL (ref 4.2–5.8)
SODIUM SERPL-SCNC: 139 MMOL/L (ref 135–146)
TRIGL SERPL-MCNC: 114 MG/DL
TSH SERPL-ACNC: 1.1 MIU/L (ref 0.4–4.5)
WBC # BLD AUTO: 5.1 THOUSAND/UL (ref 3.8–10.8)

## 2022-10-24 DIAGNOSIS — I35.0 AORTIC VALVE STENOSIS, ETIOLOGY OF CARDIAC VALVE DISEASE UNSPECIFIED: Primary | ICD-10-CM

## 2022-10-25 ENCOUNTER — HOSPITAL ENCOUNTER (OUTPATIENT)
Dept: CARDIOLOGY | Facility: HOSPITAL | Age: 42
Discharge: HOME | End: 2022-10-25
Attending: SPECIALIST
Payer: MEDICAID

## 2022-10-25 VITALS — BODY MASS INDEX: 26.44 KG/M2 | HEIGHT: 74 IN | WEIGHT: 206 LBS

## 2022-10-25 DIAGNOSIS — I35.0 AORTIC VALVE STENOSIS, ETIOLOGY OF CARDIAC VALVE DISEASE UNSPECIFIED: ICD-10-CM

## 2022-10-25 LAB
AORTIC VALVE CUSP SEPERATION: 1.2 CM
AV INDEX (PROSTH): 0.41
AV MEAN GRADIENT: 33 MMHG
AV PEAK GRADIENT: 33 MMHG
AV REGURGITATION PRESSURE HALF TIME: 764 MS
AV VALVE AREA: 1.56 CM2
AV VELOCITY RATIO: 0.26
BSA FOR ECHO PROCEDURE: 2.21 M2
CV ECHO LV RWT: 0.26 CM
DOP CALC AO PEAK VEL: 2.89 M/S
DOP CALC AO VTI: 57.6 CM
DOP CALC LVOT AREA: 3.8 CM2
DOP CALC LVOT DIAMETER: 2.2 CM
DOP CALC LVOT PEAK VEL: 0.76 M/S
DOP CALC LVOT STROKE VOLUME: 90.05 CM3
DOP CALCLVOT PEAK VEL VTI: 23.7 CM
E WAVE DECELERATION TIME: 141 MS
E/A RATIO: 0.67
E/E' RATIO: 10.13 M/S
ECHO LV POSTERIOR WALL: 0.64 CM (ref 0.6–1.1)
EJECTION FRACTION: 53 %
FRACTIONAL SHORTENING: 26 % (ref 28–44)
INTERVENTRICULAR SEPTUM: 0.53 CM (ref 0.6–1.1)
IVRT: 127 MS
LEFT ATRIUM SIZE: 3.4 CM
LEFT INTERNAL DIMENSION IN SYSTOLE: 3.62 CM (ref 2.1–4)
LEFT VENTRICLE MASS INDEX: 40 G/M2
LEFT VENTRICULAR INTERNAL DIMENSION IN DIASTOLE: 4.9 CM (ref 3.5–6)
LEFT VENTRICULAR MASS: 88.79 G
LV LATERAL E/E' RATIO: 10.86 M/S
LV SEPTAL E/E' RATIO: 9.5 M/S
MV PEAK A VEL: 1.13 M/S
MV PEAK E VEL: 0.76 M/S
MV STENOSIS PRESSURE HALF TIME: 41 MS
MV VALVE AREA P 1/2 METHOD: 5.37 CM2
PISA TR MAX VEL: 2.3 M/S
PV MEAN GRADIENT: 6 MMHG
PV PEAK VELOCITY: 1.5 M/S
RA PRESSURE: 3 MMHG
RIGHT VENTRICULAR END-DIASTOLIC DIMENSION: 2.02 CM
TDI LATERAL: 0.07 M/S
TDI SEPTAL: 0.08 M/S
TDI: 0.08 M/S
TR MAX PG: 21 MMHG
TV REST PULMONARY ARTERY PRESSURE: 24 MMHG

## 2022-10-25 PROCEDURE — 93306 TTE W/DOPPLER COMPLETE: CPT

## 2022-10-25 PROCEDURE — 93306 ECHO (CUPID ONLY): ICD-10-PCS | Mod: 26,,, | Performed by: SPECIALIST

## 2022-10-25 PROCEDURE — 93306 TTE W/DOPPLER COMPLETE: CPT | Mod: 26,,, | Performed by: SPECIALIST

## 2023-03-28 ENCOUNTER — HOSPITAL ENCOUNTER (OUTPATIENT)
Dept: RADIOLOGY | Facility: HOSPITAL | Age: 43
Discharge: HOME | End: 2023-03-28
Attending: SPECIALIST
Payer: MEDICAID

## 2023-03-28 ENCOUNTER — TELEPHONE (OUTPATIENT)
Dept: CARDIOLOGY | Facility: CLINIC | Age: 43
End: 2023-03-28

## 2023-03-28 ENCOUNTER — LAB VISIT (OUTPATIENT)
Dept: LAB | Facility: HOSPITAL | Age: 43
End: 2023-03-28
Attending: SPECIALIST
Payer: MEDICAID

## 2023-03-28 DIAGNOSIS — I35.0 AORTIC VALVE STENOSIS, ETIOLOGY OF CARDIAC VALVE DISEASE UNSPECIFIED: ICD-10-CM

## 2023-03-28 DIAGNOSIS — M25.561 RIGHT KNEE PAIN, UNSPECIFIED CHRONICITY: Primary | ICD-10-CM

## 2023-03-28 DIAGNOSIS — M25.561 RIGHT KNEE PAIN, UNSPECIFIED CHRONICITY: ICD-10-CM

## 2023-03-28 DIAGNOSIS — C81.90 HODGKIN LYMPHOMA, UNSPECIFIED HODGKIN LYMPHOMA TYPE, UNSPECIFIED BODY REGION: ICD-10-CM

## 2023-03-28 LAB
ALBUMIN SERPL BCP-MCNC: 4.3 G/DL (ref 3.5–5.2)
ALP SERPL-CCNC: 46 U/L (ref 55–135)
ALT SERPL W/O P-5'-P-CCNC: 20 U/L (ref 10–44)
ANION GAP SERPL CALC-SCNC: 6 MMOL/L (ref 8–16)
AST SERPL-CCNC: 18 U/L (ref 10–40)
BASOPHILS # BLD AUTO: 0.05 K/UL (ref 0–0.2)
BASOPHILS NFR BLD: 0.9 % (ref 0–1.9)
BILIRUB SERPL-MCNC: 0.9 MG/DL (ref 0.1–1)
BUN SERPL-MCNC: 9 MG/DL (ref 6–20)
CALCIUM SERPL-MCNC: 9.5 MG/DL (ref 8.7–10.5)
CHLORIDE SERPL-SCNC: 102 MMOL/L (ref 95–110)
CO2 SERPL-SCNC: 31 MMOL/L (ref 23–29)
CREAT SERPL-MCNC: 0.9 MG/DL (ref 0.5–1.4)
DIFFERENTIAL METHOD: NORMAL
EOSINOPHIL # BLD AUTO: 0.1 K/UL (ref 0–0.5)
EOSINOPHIL NFR BLD: 2.4 % (ref 0–8)
ERYTHROCYTE [DISTWIDTH] IN BLOOD BY AUTOMATED COUNT: 11.6 % (ref 11.5–14.5)
EST. GFR  (NO RACE VARIABLE): >60 ML/MIN/1.73 M^2
GLUCOSE SERPL-MCNC: 101 MG/DL (ref 70–110)
HCT VFR BLD AUTO: 45.9 % (ref 40–54)
HGB BLD-MCNC: 15.8 G/DL (ref 14–18)
IMM GRANULOCYTES # BLD AUTO: 0.01 K/UL (ref 0–0.04)
IMM GRANULOCYTES NFR BLD AUTO: 0.2 % (ref 0–0.5)
LYMPHOCYTES # BLD AUTO: 1.3 K/UL (ref 1–4.8)
LYMPHOCYTES NFR BLD: 22 % (ref 18–48)
MCH RBC QN AUTO: 30.8 PG (ref 27–31)
MCHC RBC AUTO-ENTMCNC: 34.4 G/DL (ref 32–36)
MCV RBC AUTO: 90 FL (ref 82–98)
MONOCYTES # BLD AUTO: 0.5 K/UL (ref 0.3–1)
MONOCYTES NFR BLD: 9.2 % (ref 4–15)
NEUTROPHILS # BLD AUTO: 3.8 K/UL (ref 1.8–7.7)
NEUTROPHILS NFR BLD: 65.3 % (ref 38–73)
NRBC BLD-RTO: 0 /100 WBC
PLATELET # BLD AUTO: 181 K/UL (ref 150–450)
PMV BLD AUTO: 10.3 FL (ref 9.2–12.9)
POTASSIUM SERPL-SCNC: 4.4 MMOL/L (ref 3.5–5.1)
PROT SERPL-MCNC: 7.2 G/DL (ref 6–8.4)
RBC # BLD AUTO: 5.13 M/UL (ref 4.6–6.2)
SODIUM SERPL-SCNC: 139 MMOL/L (ref 136–145)
WBC # BLD AUTO: 5.77 K/UL (ref 3.9–12.7)

## 2023-03-28 PROCEDURE — 80053 COMPREHEN METABOLIC PANEL: CPT | Performed by: SPECIALIST

## 2023-03-28 PROCEDURE — 36415 COLL VENOUS BLD VENIPUNCTURE: CPT | Performed by: SPECIALIST

## 2023-03-28 PROCEDURE — 73562 X-RAY EXAM OF KNEE 3: CPT | Mod: TC,PO,RT

## 2023-03-28 PROCEDURE — 85025 COMPLETE CBC W/AUTO DIFF WBC: CPT | Performed by: SPECIALIST

## 2023-05-03 DIAGNOSIS — F90.0 ATTENTION DEFICIT HYPERACTIVITY DISORDER (ADHD), PREDOMINANTLY INATTENTIVE TYPE: Primary | ICD-10-CM

## 2023-05-03 RX ORDER — DEXTROAMPHETAMINE SACCHARATE, AMPHETAMINE ASPARTATE, DEXTROAMPHETAMINE SULFATE AND AMPHETAMINE SULFATE 2.5; 2.5; 2.5; 2.5 MG/1; MG/1; MG/1; MG/1
10 TABLET ORAL DAILY
Qty: 30 TABLET | Refills: 0 | Status: SHIPPED | OUTPATIENT
Start: 2023-06-03 | End: 2023-07-26

## 2023-05-03 RX ORDER — DEXTROAMPHETAMINE SACCHARATE, AMPHETAMINE ASPARTATE, DEXTROAMPHETAMINE SULFATE AND AMPHETAMINE SULFATE 2.5; 2.5; 2.5; 2.5 MG/1; MG/1; MG/1; MG/1
10 TABLET ORAL DAILY
Qty: 30 TABLET | Refills: 0 | Status: SHIPPED | OUTPATIENT
Start: 2023-05-03 | End: 2023-06-03 | Stop reason: SDUPTHER

## 2023-05-03 RX ORDER — DEXTROAMPHETAMINE SACCHARATE, AMPHETAMINE ASPARTATE, DEXTROAMPHETAMINE SULFATE AND AMPHETAMINE SULFATE 2.5; 2.5; 2.5; 2.5 MG/1; MG/1; MG/1; MG/1
10 TABLET ORAL DAILY
Qty: 30 TABLET | Refills: 0 | Status: SHIPPED | OUTPATIENT
Start: 2023-07-03 | End: 2023-07-26

## 2023-06-03 DIAGNOSIS — F90.0 ATTENTION DEFICIT HYPERACTIVITY DISORDER (ADHD), PREDOMINANTLY INATTENTIVE TYPE: ICD-10-CM

## 2023-06-03 RX ORDER — DEXTROAMPHETAMINE SACCHARATE, AMPHETAMINE ASPARTATE, DEXTROAMPHETAMINE SULFATE AND AMPHETAMINE SULFATE 2.5; 2.5; 2.5; 2.5 MG/1; MG/1; MG/1; MG/1
10 TABLET ORAL DAILY
Qty: 30 TABLET | Refills: 0 | Status: SHIPPED | OUTPATIENT
Start: 2023-06-03 | End: 2023-07-26

## 2023-07-03 NOTE — PROGRESS NOTES
[facility] hematology Oncology in office Subsequent encounter note    7/5/23    Subjective:      Patient ID:   Michael Morley  43 y.o. male  1980   MD Michael Muhammad MD      Chief Complaint:    Enlarged Lymph node     HPI:  43 y.o. male was referred to myself per Dr. Michael MORLEY for the evaluation of an enlarged right inguinal lymph node that had been present for approximately 3 weeks.  Patient denies fever, night sweats, weight loss.  He does not have B symptoms.  At age 17 he was diagnosed with Hodgkin's disease.  He had a large mediastinal mass.  Initial biopsy per Dr. Barry was negative for malignancy.  Thereafter he was referred to [facility] and had thoracotomy there and was found to have Hodgkin's disease.  He was treated with combination chemotherapy, I believe his regimen was ABVD x6 cycles.  His treatment was complicated by the development of chest pain symptoms and I believe pericarditis with the administration of Adriamycin.  His treatment was given to Oncology at [facility].  Thereafter he went to [facility] in [place] for his adjuvant radiation therapy to the chest.  He achieved complete remission status and was followed without lymphoma recurrence.    I had seen him at the time of his original diagnosis but did not participate in his care after he went to [facility] and [facility].  He had an office chart here however it has been destroyed after several years of inactivity on the case file.  The history I am giving above is based on my recollection and based on his information given to me today.    He lives in [place].  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    He does not smoke, he does not drink alcohol,  per the chart he is allergic to Cipro.  He tells me that he does have mild allergy to iodine manifested with itching.    He has hypothyroidism and is on Synthroid.  This was  acquired after he received previous chest irradiation.    His father has psoriasis.  His mother has valvular heart disease.  He has a sister and brother alive and well.  He does not have children.    LN Bx at R inguinal area was reactive LN, no cancer seen.  Hodgkin's Dx was around age 19-20, now age 43, 25 years since HD dx.  C/O nodule or mass at L inguinal area again.  Denies Fever, NS, decreased appetite or decreased weight.  No B sx.            ROS:   GEN: normal without any fever, night sweats or weight loss  HEENT: normal with no HA's, sore throat, stiff neck, changes in vision  CV: See HPI  PULM: normal with no SOB, cough, hemoptysis, sputum or pleuritic pain  GI: normal with no abdominal pain, nausea, vomiting, constipation, diarrhea, melanotic stools, BRBPR, or hematemesis  : normal with no hematuria, dysuria  BREAST: normal with no mass, discharge, pain  SKIN: normal with no rash, erythema, bruising, or swelling     Past Medical History:   Diagnosis Date    Enlarged lymph node 10/2021    Hodgkin lymphoma 1998    Hypothyroidism      Past Surgical History:   Procedure Laterality Date    BIOPSY OF INGUINAL LYMPH NODE Right 10/22/2021    Procedure: BIOPSY, LYMPH NODE, INGUINAL;  Surgeon: Trey Lora III, MD;  [facility];  Service: General;  Laterality: Right;  right inguinal lymph node biopsy    HERNIA REPAIR      as a child    RHINOPLASTY      TONSILLECTOMY      turmor removed from chest      also pericardium        Review of patient's allergies indicates:   Allergen Reactions    Ciprofloxacin Hives and Itching    Iodine and iodide containing products Itching     And IV contrast       Social History     Socioeconomic History    Marital status: Single   Tobacco Use    Smoking status: Never    Smokeless tobacco: Never   Substance and Sexual Activity    Alcohol use: Yes     Alcohol/week: 1.0 standard drink     Types: 1 Glasses of wine per week     Comment: nightly    Drug use: Never  "        Current Outpatient Medications:     dextroamphetamine-amphetamine (ADDERALL) 10 mg Tab, Take 1 tablet (10 mg total) by mouth once daily., Disp: 30 tablet, Rfl: 0    dextroamphetamine-amphetamine (ADDERALL) 10 mg Tab, Take 1 tablet (10 mg total) by mouth once daily., Disp: 30 tablet, Rfl: 0    dextroamphetamine-amphetamine (ADDERALL) 10 mg Tab, Take 1 tablet (10 mg total) by mouth once daily., Disp: 30 tablet, Rfl: 0    levothyroxine (SYNTHROID) 100 MCG tablet, , Disp: , Rfl:     HYDROcodone-acetaminophen (NORCO) 5-325 mg per tablet, Take 1 tablet by mouth every 6 (six) hours as needed for Pain. (Patient not taking: Reported on 7/5/2023), Disp: 25 tablet, Rfl: 0    hydrocortisone (ANUSOL-HC) 25 mg suppository, Place 25 mg rectally 2 (two) times daily., Disp: , Rfl:     hydrocortisone-pramoxine (PROCTOFOAM-HS) rectal foam, Place 1 applicator rectally 2 (two) times daily., Disp: 20 g, Rfl: 3          Objective:   Vitals:  Blood pressure 131/72, pulse (!) 126, temperature 97.3 °F (36.3 °C), resp. rate 16, height 6' 2" (1.88 m), weight 87.8 kg (193 lb 9.6 oz).    Physical Examination:   GEN: no apparent distress, comfortable  HEAD: atraumatic and normocephalic  EYES: no pallor, no icterus  ENT: no pharyngeal erythema, external ears WNL; no nasal discharge; no thrush  NECK: no masses, thyroid normal, trachea midline, no LAD/LN's, supple  CV: RRR with no murmur; normal pulse  CHEST: Normal respiratory effort; CTAB; normal breath sounds; no wheeze or crackles  ABDOM: nontender and nondistended; soft;  no rebound/guarding, L/S NP  MUSC/Skeletal: ROM normal; no crepitus; joints normal; no deformities   EXTREM: no clubbing, cyanosis, inflammation or swelling.  He has varicosities noted at the R leg.  SKIN: no rashes, lesions, ulcers, petechiae   : Circ penis, no palpable mass at L or R testicle.  The penis distally is bruised and ecchymotic.  NEURO: grossly intact; motor/sensory WNL;  no tremors  PSYCH: normal mood, " affect and behavior  LYMPH: normal cervical, supraclavicular, axillary and L groin LN's.  At the R inguinal area, he has a firm movable 2 x 1 cm LN or hernia on exam, evident when he is standing.  Breasts: L & R no palpable mass      Labs:   Lab Results   Component Value Date    WBC 5.77 03/28/2023    HGB 15.8 03/28/2023    HCT 45.9 03/28/2023    MCV 90 03/28/2023     03/28/2023    CMP  Sodium   Date Value Ref Range Status   03/28/2023 139 136 - 145 mmol/L Final     Potassium   Date Value Ref Range Status   03/28/2023 4.4 3.5 - 5.1 mmol/L Final     Chloride   Date Value Ref Range Status   03/28/2023 102 95 - 110 mmol/L Final     CO2   Date Value Ref Range Status   03/28/2023 31 (H) 23 - 29 mmol/L Final     Glucose   Date Value Ref Range Status   03/28/2023 101 70 - 110 mg/dL Final     BUN   Date Value Ref Range Status   03/28/2023 9 6 - 20 mg/dL Final     Creatinine   Date Value Ref Range Status   03/28/2023 0.9 0.5 - 1.4 mg/dL Final     Calcium   Date Value Ref Range Status   03/28/2023 9.5 8.7 - 10.5 mg/dL Final     Total Protein   Date Value Ref Range Status   03/28/2023 7.2 6.0 - 8.4 g/dL Final     Albumin   Date Value Ref Range Status   03/28/2023 4.3 3.5 - 5.2 g/dL Final     Total Bilirubin   Date Value Ref Range Status   03/28/2023 0.9 0.1 - 1.0 mg/dL Final     Comment:     For infants and newborns, interpretation of results should be based  on gestational age, weight and in agreement with clinical  observations.    Premature Infant recommended reference ranges:  Up to 24 hours.............<8.0 mg/dL  Up to 48 hours............<12.0 mg/dL  3-5 days..................<15.0 mg/dL  6-29 days.................<15.0 mg/dL       Alkaline Phosphatase   Date Value Ref Range Status   03/28/2023 46 (L) 55 - 135 U/L Final     AST   Date Value Ref Range Status   03/28/2023 18 10 - 40 U/L Final     ALT   Date Value Ref Range Status   03/28/2023 20 10 - 44 U/L Final     Anion Gap   Date Value Ref Range Status    03/28/2023 6 (L) 8 - 16 mmol/L Final     eGFR if    Date Value Ref Range Status   05/06/2022 117 > OR = 60 mL/min/1.73m2 Final     eGFR if non    Date Value Ref Range Status   05/06/2022 101 > OR = 60 mL/min/1.73m2 Final     Ordered CBC, CMP, TSH, T4, AFP, B-HCG    Assessment:   (1) 43 y.o. male with Hodgkins Dx at age 17, treated with Rad Rx adjuvantly after  combination chemotherapy, achieving CR.    (2)Now with 2 cm firm movable R inguinal LN or hernia,   He has hx of R inguinal hernia repair at age 1.  He has been building a boat at home, moving 3 hulls.    (3)Adriamycin hx.  Ej Fx 53% 10/25/22.    (4)PET SATISH 10/28/21.    (5)R inguinal nodule, check U/S for hernia or LN.  May need to see Dr. Johnson again.    Schedule U/S of R inguinal area.  RTC 1 week, can cancel.

## 2023-07-05 ENCOUNTER — OFFICE VISIT (OUTPATIENT)
Dept: HEMATOLOGY/ONCOLOGY | Facility: CLINIC | Age: 43
End: 2023-07-05
Payer: MEDICAID

## 2023-07-05 VITALS
RESPIRATION RATE: 16 BRPM | TEMPERATURE: 97 F | DIASTOLIC BLOOD PRESSURE: 72 MMHG | WEIGHT: 193.63 LBS | BODY MASS INDEX: 24.85 KG/M2 | HEART RATE: 126 BPM | HEIGHT: 74 IN | SYSTOLIC BLOOD PRESSURE: 131 MMHG

## 2023-07-05 DIAGNOSIS — R19.09 INGUINAL NODULE: Primary | ICD-10-CM

## 2023-07-05 PROCEDURE — 3008F BODY MASS INDEX DOCD: CPT | Mod: CPTII,S$GLB,, | Performed by: INTERNAL MEDICINE

## 2023-07-05 PROCEDURE — 1159F MED LIST DOCD IN RCRD: CPT | Mod: CPTII,S$GLB,, | Performed by: INTERNAL MEDICINE

## 2023-07-05 PROCEDURE — 3075F PR MOST RECENT SYSTOLIC BLOOD PRESS GE 130-139MM HG: ICD-10-PCS | Mod: CPTII,S$GLB,, | Performed by: INTERNAL MEDICINE

## 2023-07-05 PROCEDURE — 99214 PR OFFICE/OUTPT VISIT, EST, LEVL IV, 30-39 MIN: ICD-10-PCS | Mod: S$GLB,,, | Performed by: INTERNAL MEDICINE

## 2023-07-05 PROCEDURE — 1159F PR MEDICATION LIST DOCUMENTED IN MEDICAL RECORD: ICD-10-PCS | Mod: CPTII,S$GLB,, | Performed by: INTERNAL MEDICINE

## 2023-07-05 PROCEDURE — 3075F SYST BP GE 130 - 139MM HG: CPT | Mod: CPTII,S$GLB,, | Performed by: INTERNAL MEDICINE

## 2023-07-05 PROCEDURE — 99214 OFFICE O/P EST MOD 30 MIN: CPT | Mod: S$GLB,,, | Performed by: INTERNAL MEDICINE

## 2023-07-05 PROCEDURE — 3078F PR MOST RECENT DIASTOLIC BLOOD PRESSURE < 80 MM HG: ICD-10-PCS | Mod: CPTII,S$GLB,, | Performed by: INTERNAL MEDICINE

## 2023-07-05 PROCEDURE — 3078F DIAST BP <80 MM HG: CPT | Mod: CPTII,S$GLB,, | Performed by: INTERNAL MEDICINE

## 2023-07-05 PROCEDURE — 3008F PR BODY MASS INDEX (BMI) DOCUMENTED: ICD-10-PCS | Mod: CPTII,S$GLB,, | Performed by: INTERNAL MEDICINE

## 2023-07-10 RX ORDER — LEVOTHYROXINE SODIUM 100 UG/1
TABLET ORAL
Qty: 90 TABLET | Refills: 6 | Status: SHIPPED | OUTPATIENT
Start: 2023-07-10 | End: 2023-09-08

## 2023-07-18 ENCOUNTER — HOSPITAL ENCOUNTER (OUTPATIENT)
Dept: RADIOLOGY | Facility: HOSPITAL | Age: 43
Discharge: HOME | End: 2023-07-18
Attending: INTERNAL MEDICINE
Payer: MEDICAID

## 2023-07-18 DIAGNOSIS — R19.09 INGUINAL NODULE: ICD-10-CM

## 2023-07-18 DIAGNOSIS — F90.0 ATTENTION DEFICIT HYPERACTIVITY DISORDER (ADHD), PREDOMINANTLY INATTENTIVE TYPE: ICD-10-CM

## 2023-07-18 DIAGNOSIS — E78.00 HYPERCHOLESTEREMIA: ICD-10-CM

## 2023-07-18 DIAGNOSIS — E03.2 HYPOTHYROIDISM DUE TO NON-MEDICATION EXOGENOUS SUBSTANCES: Primary | ICD-10-CM

## 2023-07-18 PROCEDURE — 76882 US LMTD JT/FCL EVL NVASC XTR: CPT | Mod: TC,PO,RT

## 2023-07-26 RX ORDER — DEXTROAMPHETAMINE SACCHARATE, AMPHETAMINE ASPARTATE, DEXTROAMPHETAMINE SULFATE AND AMPHETAMINE SULFATE 2.5; 2.5; 2.5; 2.5 MG/1; MG/1; MG/1; MG/1
10 TABLET ORAL DAILY
Qty: 30 TABLET | Refills: 0 | Status: SHIPPED | OUTPATIENT
Start: 2023-07-26 | End: 2023-08-24 | Stop reason: SDUPTHER

## 2023-07-29 ENCOUNTER — TELEPHONE (OUTPATIENT)
Dept: HEMATOLOGY/ONCOLOGY | Facility: CLINIC | Age: 43
End: 2023-07-29

## 2023-07-31 ENCOUNTER — TELEPHONE (OUTPATIENT)
Dept: HEMATOLOGY/ONCOLOGY | Facility: CLINIC | Age: 43
End: 2023-07-31

## 2023-08-01 ENCOUNTER — TELEPHONE (OUTPATIENT)
Dept: HEMATOLOGY/ONCOLOGY | Facility: CLINIC | Age: 43
End: 2023-08-01

## 2023-08-20 ENCOUNTER — TELEPHONE (OUTPATIENT)
Dept: HEMATOLOGY/ONCOLOGY | Facility: CLINIC | Age: 43
End: 2023-08-20

## 2023-08-20 DIAGNOSIS — E78.00 HYPERCHOLESTEREMIA: Primary | ICD-10-CM

## 2023-08-20 DIAGNOSIS — R59.0 LYMPHADENOPATHY, INGUINAL: ICD-10-CM

## 2023-08-20 DIAGNOSIS — C81.92 HODGKIN LYMPHOMA OF INTRATHORACIC LYMPH NODES, UNSPECIFIED HODGKIN LYMPHOMA TYPE: ICD-10-CM

## 2023-08-20 RX ORDER — EZETIMIBE 10 MG/1
10 TABLET ORAL DAILY
Qty: 90 TABLET | Refills: 3 | Status: SHIPPED | OUTPATIENT
Start: 2023-08-20 | End: 2024-08-19

## 2023-08-20 RX ORDER — ATORVASTATIN CALCIUM 10 MG/1
10 TABLET, FILM COATED ORAL DAILY
Qty: 90 TABLET | Refills: 3 | Status: SHIPPED | OUTPATIENT
Start: 2023-08-20 | End: 2024-08-19

## 2023-08-20 RX ORDER — ATORVASTATIN CALCIUM 20 MG/1
20 TABLET, FILM COATED ORAL DAILY
Qty: 90 TABLET | Refills: 3 | Status: SHIPPED | OUTPATIENT
Start: 2023-08-20 | End: 2023-08-20

## 2023-08-21 NOTE — TELEPHONE ENCOUNTER
Pt reports continued R inguinal LN enlargement.    Orders placed in Epic for PET.    After PET is done, RTC 2-3 days thereafter.

## 2023-08-24 DIAGNOSIS — F90.0 ATTENTION DEFICIT HYPERACTIVITY DISORDER (ADHD), PREDOMINANTLY INATTENTIVE TYPE: ICD-10-CM

## 2023-08-24 RX ORDER — DEXTROAMPHETAMINE SACCHARATE, AMPHETAMINE ASPARTATE, DEXTROAMPHETAMINE SULFATE AND AMPHETAMINE SULFATE 2.5; 2.5; 2.5; 2.5 MG/1; MG/1; MG/1; MG/1
10 TABLET ORAL DAILY
Qty: 30 TABLET | Refills: 0 | Status: SHIPPED | OUTPATIENT
Start: 2023-08-24 | End: 2023-09-28 | Stop reason: SDUPTHER

## 2023-09-08 RX ORDER — LEVOTHYROXINE SODIUM 100 UG/1
TABLET ORAL
Qty: 30 TABLET | Refills: 20 | Status: SHIPPED | OUTPATIENT
Start: 2023-09-08

## 2023-09-13 ENCOUNTER — OFFICE VISIT (OUTPATIENT)
Dept: HEMATOLOGY/ONCOLOGY | Facility: CLINIC | Age: 43
End: 2023-09-13
Payer: MEDICAID

## 2023-09-13 VITALS
SYSTOLIC BLOOD PRESSURE: 126 MMHG | BODY MASS INDEX: 26.27 KG/M2 | HEIGHT: 74 IN | RESPIRATION RATE: 16 BRPM | HEART RATE: 110 BPM | TEMPERATURE: 98 F | DIASTOLIC BLOOD PRESSURE: 75 MMHG | WEIGHT: 204.69 LBS

## 2023-09-13 DIAGNOSIS — R59.0 INGUINAL LYMPHADENOPATHY: ICD-10-CM

## 2023-09-13 DIAGNOSIS — C81.92 HODGKIN LYMPHOMA OF INTRATHORACIC LYMPH NODES, UNSPECIFIED HODGKIN LYMPHOMA TYPE: Primary | ICD-10-CM

## 2023-09-13 PROCEDURE — 99214 PR OFFICE/OUTPT VISIT, EST, LEVL IV, 30-39 MIN: ICD-10-PCS | Mod: S$GLB,,, | Performed by: INTERNAL MEDICINE

## 2023-09-13 PROCEDURE — 3008F BODY MASS INDEX DOCD: CPT | Mod: CPTII,S$GLB,, | Performed by: INTERNAL MEDICINE

## 2023-09-13 PROCEDURE — 3078F PR MOST RECENT DIASTOLIC BLOOD PRESSURE < 80 MM HG: ICD-10-PCS | Mod: CPTII,S$GLB,, | Performed by: INTERNAL MEDICINE

## 2023-09-13 PROCEDURE — 1159F PR MEDICATION LIST DOCUMENTED IN MEDICAL RECORD: ICD-10-PCS | Mod: CPTII,S$GLB,, | Performed by: INTERNAL MEDICINE

## 2023-09-13 PROCEDURE — 99214 OFFICE O/P EST MOD 30 MIN: CPT | Mod: S$GLB,,, | Performed by: INTERNAL MEDICINE

## 2023-09-13 PROCEDURE — 3074F SYST BP LT 130 MM HG: CPT | Mod: CPTII,S$GLB,, | Performed by: INTERNAL MEDICINE

## 2023-09-13 PROCEDURE — 3008F PR BODY MASS INDEX (BMI) DOCUMENTED: ICD-10-PCS | Mod: CPTII,S$GLB,, | Performed by: INTERNAL MEDICINE

## 2023-09-13 PROCEDURE — 1159F MED LIST DOCD IN RCRD: CPT | Mod: CPTII,S$GLB,, | Performed by: INTERNAL MEDICINE

## 2023-09-13 PROCEDURE — 3074F PR MOST RECENT SYSTOLIC BLOOD PRESSURE < 130 MM HG: ICD-10-PCS | Mod: CPTII,S$GLB,, | Performed by: INTERNAL MEDICINE

## 2023-09-13 PROCEDURE — 3078F DIAST BP <80 MM HG: CPT | Mod: CPTII,S$GLB,, | Performed by: INTERNAL MEDICINE

## 2023-09-13 NOTE — PROGRESS NOTES
[facility] hematology Oncology in office Subsequent encounter note    9/13/23    Subjective:      Patient ID:   Michael Morley  43 y.o. male  1980   MD Michael Muhammad MD      Chief Complaint:    Enlarged Lymph node     HPI:  43 y.o. male was referred to myself per Dr. Michael MORLEY for the evaluation of an enlarged right inguinal lymph node that had been present for approximately 3 weeks.  Patient denies fever, night sweats, weight loss.  He does not have B symptoms.  At age 17 he was diagnosed with Hodgkin's disease.  He had a large mediastinal mass.  Initial biopsy per Dr. Barry was negative for malignancy.  Thereafter he was referred to [facility] and had thoracotomy there and was found to have Hodgkin's disease.  He was treated with combination chemotherapy, I believe his regimen was ABVD x6 cycles.  His treatment was complicated by the development of chest pain symptoms and I believe pericarditis with the administration of Adriamycin.  His treatment was given to Oncology at [facility].  Thereafter he went to [facility] in [place] for his adjuvant radiation therapy to the chest.  He achieved complete remission status and was followed without lymphoma recurrence.    I had seen him at the time of his original diagnosis but did not participate in his care after he went to [facility] and [facility].  He had an office chart here however it has been destroyed after several years of inactivity on the case file.  The history I am giving above is based on my recollection and based on his information given to me today.    He lives in [place].  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    He does not smoke, he does not drink alcohol,  per the chart he is allergic to Cipro.  He tells me that he does have mild allergy to iodine manifested with itching.    He has hypothyroidism and is on Synthroid.  This was  acquired after he received previous chest irradiation.    His father has psoriasis.  His mother has valvular heart disease.  He has a sister and brother alive and well.  He does not have children.    LN Bx at R inguinal area was reactive LN, no cancer seen.  Hodgkin's Dx was around age 19-20, now age 43, 25 years since HD dx.  C/O nodule or mass at L inguinal area again.  Denies Fever, NS, decreased appetite or decreased weight.  No B sx.    U/S of inguinal area was negative.        ROS:   GEN: normal without any fever, night sweats or weight loss  HEENT: normal with no HA's, sore throat, stiff neck, changes in vision  CV: See HPI  PULM: normal with no SOB, cough, hemoptysis, sputum or pleuritic pain  GI: normal with no abdominal pain, nausea, vomiting, constipation, diarrhea, melanotic stools, BRBPR, or hematemesis  : normal with no hematuria, dysuria  BREAST: normal with no mass, discharge, pain  SKIN: normal with no rash, erythema, bruising, or swelling     Past Medical History:   Diagnosis Date    Enlarged lymph node 10/2021    Hodgkin lymphoma 1998    Hypothyroidism      Past Surgical History:   Procedure Laterality Date    BIOPSY OF INGUINAL LYMPH NODE Right 10/22/2021    Procedure: BIOPSY, LYMPH NODE, INGUINAL;  Surgeon: Trey Lora III, MD;  Location: [facility];  Service: General;  Laterality: Right;  right inguinal lymph node biopsy    HERNIA REPAIR      as a child    RHINOPLASTY      TONSILLECTOMY      turmor removed from chest      also pericardium        Review of patient's allergies indicates:   Allergen Reactions    Ciprofloxacin Hives and Itching    Iodine and iodide containing products Itching     And IV contrast       Social History     Socioeconomic History    Marital status: Single   Tobacco Use    Smoking status: Never    Smokeless tobacco: Never   Substance and Sexual Activity    Alcohol use: Yes     Alcohol/week: 1.0 standard drink of alcohol     Types: 1 Glasses of wine per week      "Comment: nightly    Drug use: Never         Current Outpatient Medications:     atorvastatin (LIPITOR) 10 MG tablet, Take 1 tablet (10 mg total) by mouth once daily., Disp: 90 tablet, Rfl: 3    dextroamphetamine-amphetamine (ADDERALL) 10 mg Tab, Take 1 tablet (10 mg total) by mouth once daily., Disp: 30 tablet, Rfl: 0    ezetimibe (ZETIA) 10 mg tablet, Take 1 tablet (10 mg total) by mouth once daily., Disp: 90 tablet, Rfl: 3    hydrocortisone (ANUSOL-HC) 25 mg suppository, Place 25 mg rectally 2 (two) times daily., Disp: , Rfl:     hydrocortisone-pramoxine (PROCTOFOAM-HS) rectal foam, Place 1 applicator rectally 2 (two) times daily., Disp: 20 g, Rfl: 3    levothyroxine (SYNTHROID) 100 MCG tablet, TAKE 1 TABLET BY MOUTH EVERY DAY, Disp: 30 tablet, Rfl: 20    HYDROcodone-acetaminophen (NORCO) 5-325 mg per tablet, Take 1 tablet by mouth every 6 (six) hours as needed for Pain. (Patient not taking: Reported on 7/5/2023), Disp: 25 tablet, Rfl: 0          Objective:   Vitals:  Blood pressure 126/75, pulse 110, temperature 97.8 °F (36.6 °C), resp. rate 16, height 6' 2" (1.88 m), weight 92.9 kg (204 lb 11.2 oz).    Physical Examination:   GEN: no apparent distress, comfortable  HEAD: atraumatic and normocephalic  EYES: no pallor, no icterus  ENT: no pharyngeal erythema, external ears WNL; no nasal discharge; no thrush  NECK: no masses, thyroid normal, trachea midline, no LAD/LN's, supple  CV: RRR with no murmur; normal pulse  CHEST: Normal respiratory effort; CTAB; normal breath sounds; no wheeze or crackles  ABDOM: nontender and nondistended; soft;  no rebound/guarding, L/S NP  MUSC/Skeletal: ROM normal; no crepitus; joints normal; no deformities   EXTREM: no clubbing, cyanosis, inflammation or swelling.  He has varicosities noted at the R leg.  SKIN: no rashes, lesions, ulcers, petechiae   : Not examined on this visit.  He kept genitals covered and in place with gown  NEURO: grossly intact; motor/sensory WNL;  no " tremors  PSYCH: normal mood, affect and behavior  LYMPH: normal cervical, supraclavicular, axillary and L groin LN's.  At the R inguinal area, he has a firm movable 2 x 1 cm LN on exam, evident when he is standing.  Unchanged from the last exam 2 months ago.  Breasts: L & R no palpable mass      Labs:   Lab Results   Component Value Date    WBC 5.77 03/28/2023    HGB 15.8 03/28/2023    HCT 45.9 03/28/2023    MCV 90 03/28/2023     03/28/2023    CMP  Sodium   Date Value Ref Range Status   07/18/2023 139 136 - 145 mmol/L Final     Potassium   Date Value Ref Range Status   07/18/2023 4.1 3.5 - 5.1 mmol/L Final     Chloride   Date Value Ref Range Status   07/18/2023 103 95 - 110 mmol/L Final     CO2   Date Value Ref Range Status   07/18/2023 26 23 - 29 mmol/L Final     Glucose   Date Value Ref Range Status   07/18/2023 110 70 - 110 mg/dL Final     BUN   Date Value Ref Range Status   07/18/2023 11 6 - 20 mg/dL Final     Creatinine   Date Value Ref Range Status   07/18/2023 0.9 0.5 - 1.4 mg/dL Final     Calcium   Date Value Ref Range Status   07/18/2023 9.1 8.7 - 10.5 mg/dL Final     Total Protein   Date Value Ref Range Status   03/28/2023 7.2 6.0 - 8.4 g/dL Final     Albumin   Date Value Ref Range Status   03/28/2023 4.3 3.5 - 5.2 g/dL Final     Total Bilirubin   Date Value Ref Range Status   03/28/2023 0.9 0.1 - 1.0 mg/dL Final     Comment:     For infants and newborns, interpretation of results should be based  on gestational age, weight and in agreement with clinical  observations.    Premature Infant recommended reference ranges:  Up to 24 hours.............<8.0 mg/dL  Up to 48 hours............<12.0 mg/dL  3-5 days..................<15.0 mg/dL  6-29 days.................<15.0 mg/dL       Alkaline Phosphatase   Date Value Ref Range Status   03/28/2023 46 (L) 55 - 135 U/L Final     AST   Date Value Ref Range Status   03/28/2023 18 10 - 40 U/L Final     ALT   Date Value Ref Range Status   03/28/2023 20 10 - 44  U/L Final     Anion Gap   Date Value Ref Range Status   07/18/2023 10 8 - 16 mmol/L Final     eGFR if    Date Value Ref Range Status   05/06/2022 117 > OR = 60 mL/min/1.73m2 Final     eGFR if non    Date Value Ref Range Status   05/06/2022 101 > OR = 60 mL/min/1.73m2 Final     Ordered CBC, CMP, TSH, T4, AFP, B-HCG    Assessment:   (1) 43 y.o. male with Hodgkins Dx at age 17, treated with Rad Rx adjuvantly after  combination chemotherapy, achieving CR.    (2)Now with 2 cm firm movable R inguinal LN or hernia,   He has hx of R inguinal hernia repair at age 1.  He has been building a boat at home, moving 3 hulls.    (3)Adriamycin hx.  Ej Fx 53% 10/25/22.    (4)PET SATISH 10/28/21.    (5)R inguinal LN palpable, no change since 7/2023.    (6)Repeat PET ordered.  Not approved per [employer]  Letter of appeal sent to them, pending.    RTC 1 month can cancel.  RTC 6 month.

## 2023-09-28 ENCOUNTER — TELEPHONE (OUTPATIENT)
Dept: FAMILY MEDICINE | Facility: CLINIC | Age: 43
End: 2023-09-28
Payer: MEDICAID

## 2023-09-28 DIAGNOSIS — F90.0 ATTENTION DEFICIT HYPERACTIVITY DISORDER (ADHD), PREDOMINANTLY INATTENTIVE TYPE: ICD-10-CM

## 2023-09-28 RX ORDER — DEXTROAMPHETAMINE SACCHARATE, AMPHETAMINE ASPARTATE, DEXTROAMPHETAMINE SULFATE AND AMPHETAMINE SULFATE 2.5; 2.5; 2.5; 2.5 MG/1; MG/1; MG/1; MG/1
10 TABLET ORAL DAILY
Qty: 30 TABLET | Refills: 0 | Status: SHIPPED | OUTPATIENT
Start: 2023-09-28 | End: 2023-10-30 | Stop reason: SDUPTHER

## 2023-09-28 NOTE — TELEPHONE ENCOUNTER
I communicate with pt; needs RF for Adderall.  No side effects such as palps, appetite suppression.  Working well for him for focus.  RF sent.

## 2023-10-26 ENCOUNTER — TELEPHONE (OUTPATIENT)
Dept: HEMATOLOGY/ONCOLOGY | Facility: CLINIC | Age: 43
End: 2023-10-26

## 2023-10-26 NOTE — TELEPHONE ENCOUNTER
Spoke with patient. Said that he was feeling fine. Advised that he could cancel because Dr. Sahu had put in notes that if he was doing well he could be seen at another time. Pt agreed to cancel and will cb if he needs to be seen. Advised to call us with any concerns. Pt verbalized understanding.

## 2023-10-30 DIAGNOSIS — F90.0 ATTENTION DEFICIT HYPERACTIVITY DISORDER (ADHD), PREDOMINANTLY INATTENTIVE TYPE: ICD-10-CM

## 2023-10-30 RX ORDER — DEXTROAMPHETAMINE SACCHARATE, AMPHETAMINE ASPARTATE, DEXTROAMPHETAMINE SULFATE AND AMPHETAMINE SULFATE 2.5; 2.5; 2.5; 2.5 MG/1; MG/1; MG/1; MG/1
10 TABLET ORAL DAILY
Qty: 30 TABLET | Refills: 0 | Status: SHIPPED | OUTPATIENT
Start: 2023-10-30 | End: 2023-12-05 | Stop reason: SDUPTHER

## 2023-12-05 ENCOUNTER — TELEPHONE (OUTPATIENT)
Dept: FAMILY MEDICINE | Facility: CLINIC | Age: 43
End: 2023-12-05
Payer: MEDICAID

## 2023-12-05 ENCOUNTER — OFFICE VISIT (OUTPATIENT)
Dept: FAMILY MEDICINE | Facility: CLINIC | Age: 43
End: 2023-12-05
Payer: MEDICAID

## 2023-12-05 VITALS
HEIGHT: 74 IN | WEIGHT: 205 LBS | DIASTOLIC BLOOD PRESSURE: 72 MMHG | HEART RATE: 88 BPM | BODY MASS INDEX: 26.31 KG/M2 | SYSTOLIC BLOOD PRESSURE: 122 MMHG

## 2023-12-05 DIAGNOSIS — F90.0 ATTENTION DEFICIT HYPERACTIVITY DISORDER (ADHD), PREDOMINANTLY INATTENTIVE TYPE: ICD-10-CM

## 2023-12-05 PROCEDURE — 1160F PR REVIEW ALL MEDS BY PRESCRIBER/CLIN PHARMACIST DOCUMENTED: ICD-10-PCS | Mod: CPTII,95,, | Performed by: FAMILY MEDICINE

## 2023-12-05 PROCEDURE — 3078F DIAST BP <80 MM HG: CPT | Mod: CPTII,95,, | Performed by: FAMILY MEDICINE

## 2023-12-05 PROCEDURE — 1159F MED LIST DOCD IN RCRD: CPT | Mod: CPTII,95,, | Performed by: FAMILY MEDICINE

## 2023-12-05 PROCEDURE — 3074F SYST BP LT 130 MM HG: CPT | Mod: CPTII,95,, | Performed by: FAMILY MEDICINE

## 2023-12-05 PROCEDURE — 1160F RVW MEDS BY RX/DR IN RCRD: CPT | Mod: CPTII,95,, | Performed by: FAMILY MEDICINE

## 2023-12-05 PROCEDURE — 3074F PR MOST RECENT SYSTOLIC BLOOD PRESSURE < 130 MM HG: ICD-10-PCS | Mod: CPTII,95,, | Performed by: FAMILY MEDICINE

## 2023-12-05 PROCEDURE — 3078F PR MOST RECENT DIASTOLIC BLOOD PRESSURE < 80 MM HG: ICD-10-PCS | Mod: CPTII,95,, | Performed by: FAMILY MEDICINE

## 2023-12-05 PROCEDURE — 99213 OFFICE O/P EST LOW 20 MIN: CPT | Mod: 95,,, | Performed by: FAMILY MEDICINE

## 2023-12-05 PROCEDURE — 3008F BODY MASS INDEX DOCD: CPT | Mod: CPTII,95,, | Performed by: FAMILY MEDICINE

## 2023-12-05 PROCEDURE — 3008F PR BODY MASS INDEX (BMI) DOCUMENTED: ICD-10-PCS | Mod: CPTII,95,, | Performed by: FAMILY MEDICINE

## 2023-12-05 PROCEDURE — 99213 PR OFFICE/OUTPT VISIT, EST, LEVL III, 20-29 MIN: ICD-10-PCS | Mod: 95,,, | Performed by: FAMILY MEDICINE

## 2023-12-05 PROCEDURE — 1159F PR MEDICATION LIST DOCUMENTED IN MEDICAL RECORD: ICD-10-PCS | Mod: CPTII,95,, | Performed by: FAMILY MEDICINE

## 2023-12-05 RX ORDER — DEXTROAMPHETAMINE SACCHARATE, AMPHETAMINE ASPARTATE, DEXTROAMPHETAMINE SULFATE AND AMPHETAMINE SULFATE 2.5; 2.5; 2.5; 2.5 MG/1; MG/1; MG/1; MG/1
10 TABLET ORAL DAILY
Qty: 30 TABLET | Refills: 0 | Status: SHIPPED | OUTPATIENT
Start: 2023-12-05 | End: 2024-01-08 | Stop reason: SDUPTHER

## 2023-12-05 NOTE — PROGRESS NOTES
Subjective:       Patient ID: Michael Hart is a 43 y.o. male.    Chief Complaint: No chief complaint on file.    Patient seen for f/u visit.  Telemed but AV tech issues so completed by phone.  Needs RF on Adderall - no side effects - no palpitations or sleep or appetite disturbance.  He notes no symptoms on days he takes it and on days he may miss it, he does note decreased focus.        Review of Systems   Constitutional:  Negative for activity change and unexpected weight change.   HENT:  Negative for hearing loss, rhinorrhea and trouble swallowing.    Eyes:  Negative for discharge and visual disturbance.   Respiratory:  Negative for chest tightness and wheezing.    Cardiovascular:  Negative for chest pain and palpitations.   Gastrointestinal:  Negative for blood in stool, constipation, diarrhea and vomiting.   Endocrine: Negative for polydipsia and polyuria.   Genitourinary:  Negative for difficulty urinating, hematuria and urgency.   Musculoskeletal:  Negative for arthralgias, joint swelling and neck pain.   Neurological:  Negative for weakness and headaches.   Psychiatric/Behavioral:  Negative for confusion and dysphoric mood.        Objective:      Physical Exam  Vitals reviewed.   Constitutional:       General: He is not in acute distress.     Comments: Televisit - pt is COVID positive.   HENT:      Mouth/Throat:      Comments: Nml voice/speech  Cardiovascular:      Rate and Rhythm: Normal rate.   Pulmonary:      Effort: No respiratory distress.   Neurological:      Mental Status: He is alert.      Comments: Alert and interactive   Psychiatric:         Mood and Affect: Mood normal.         Thought Content: Thought content normal.         Assessment:       1. Attention deficit hyperactivity disorder (ADHD), predominantly inattentive type        Plan:       Attention deficit hyperactivity disorder (ADHD), predominantly inattentive type  -     dextroamphetamine-amphetamine (ADDERALL) 10 mg Tab; Take 1  tablet (10 mg total) by mouth once daily.  Dispense: 30 tablet; Refill: 0

## 2023-12-05 NOTE — TELEPHONE ENCOUNTER
----- Message from Britany Cardenas sent at 12/4/2023  4:01 PM CST -----  Regarding: calll back  Type:  Needs Medical Advice    Who Called: pt      Would the patient rather a call back or a response via [facility]? Call back    Best Call Back Number: [phone]    Additional Information: Pt would like an appt . Please advise -------------------Thank you

## 2023-12-06 ENCOUNTER — TELEPHONE (OUTPATIENT)
Dept: CARDIOLOGY | Facility: CLINIC | Age: 43
End: 2023-12-06
Payer: MEDICAID

## 2023-12-06 DIAGNOSIS — I35.0 AORTIC VALVE STENOSIS, ETIOLOGY OF CARDIAC VALVE DISEASE UNSPECIFIED: Primary | ICD-10-CM

## 2023-12-06 DIAGNOSIS — E03.2 HYPOTHYROIDISM DUE TO NON-MEDICATION EXOGENOUS SUBSTANCES: ICD-10-CM

## 2023-12-06 DIAGNOSIS — E78.00 HYPERCHOLESTEREMIA: ICD-10-CM

## 2024-01-02 ENCOUNTER — HOSPITAL ENCOUNTER (OUTPATIENT)
Dept: CARDIOLOGY | Facility: HOSPITAL | Age: 44
Discharge: HOME | End: 2024-01-02
Attending: INTERNAL MEDICINE
Payer: MEDICAID

## 2024-01-02 VITALS — HEIGHT: 74 IN | BODY MASS INDEX: 26.31 KG/M2 | WEIGHT: 205 LBS

## 2024-01-02 DIAGNOSIS — I35.0 AORTIC VALVE STENOSIS, ETIOLOGY OF CARDIAC VALVE DISEASE UNSPECIFIED: ICD-10-CM

## 2024-01-02 LAB
AORTIC ROOT ANNULUS: 3.13 CM
AORTIC VALVE CUSP SEPERATION: 0.71 CM
AV INDEX (PROSTH): 0.46
AV MEAN GRADIENT: 14 MMHG
AV PEAK GRADIENT: 23 MMHG
AV REGURGITATION PRESSURE HALF TIME: 407.84 MS
AV VALVE AREA BY VELOCITY RATIO: 1.27 CM²
AV VALVE AREA: 1.49 CM²
AV VELOCITY RATIO: 0.39
BSA FOR ECHO PROCEDURE: 2.2 M2
CV ECHO LV RWT: 0.45 CM
DOP CALC AO PEAK VEL: 2.38 M/S
DOP CALC AO VTI: 49.7 CM
DOP CALC LVOT AREA: 3.2 CM2
DOP CALC LVOT DIAMETER: 2.02 CM
DOP CALC LVOT PEAK VEL: 0.94 M/S
DOP CALC LVOT STROKE VOLUME: 73.99 CM3
DOP CALC MV VTI: 27.1 CM
DOP CALCLVOT PEAK VEL VTI: 23.1 CM
E WAVE DECELERATION TIME: 118.39 MSEC
E/A RATIO: 0.78
E/E' RATIO: 11.75 M/S
ECHO LV POSTERIOR WALL: 1.08 CM (ref 0.6–1.1)
EJECTION FRACTION: 50 %
FRACTIONAL SHORTENING: 25 % (ref 28–44)
INTERVENTRICULAR SEPTUM: 1.06 CM (ref 0.6–1.1)
IVC DIAMETER: 1.7 CM
IVRT: 68.51 MSEC
LA MAJOR: 4.56 CM
LA MINOR: 5.14 CM
LEFT ATRIUM SIZE: 3.65 CM
LEFT INTERNAL DIMENSION IN SYSTOLE: 3.58 CM (ref 2.1–4)
LEFT VENTRICLE DIASTOLIC VOLUME INDEX: 48.45 ML/M2
LEFT VENTRICLE DIASTOLIC VOLUME: 106.6 ML
LEFT VENTRICLE MASS INDEX: 84 G/M2
LEFT VENTRICLE SYSTOLIC VOLUME INDEX: 24.4 ML/M2
LEFT VENTRICLE SYSTOLIC VOLUME: 53.65 ML
LEFT VENTRICULAR INTERNAL DIMENSION IN DIASTOLE: 4.78 CM (ref 3.5–6)
LEFT VENTRICULAR MASS: 185.44 G
LV LATERAL E/E' RATIO: 9.4 M/S
LV SEPTAL E/E' RATIO: 15.67 M/S
LVOT MG: 2.24 MMHG
LVOT MV: 0.7 CM/S
MV A" WAVE DURATION": 105.61 MSEC
MV MEAN GRADIENT: 5 MMHG
MV PEAK A VEL: 1.21 M/S
MV PEAK E VEL: 0.94 M/S
MV PEAK GRADIENT: 7 MMHG
MV STENOSIS PRESSURE HALF TIME: 44.65 MS
MV VALVE AREA BY CONTINUITY EQUATION: 2.73 CM2
MV VALVE AREA P 1/2 METHOD: 4.93 CM2
PISA AR MAX VEL: 3.74 M/S
PISA MRMAX VEL: 5.06 M/S
PV MV: 0.72 M/S
PV PEAK GRADIENT: 5 MMHG
PV PEAK VELOCITY: 1.09 M/S
RA MAJOR: 5.01 CM
RA PRESSURE ESTIMATED: 3 MMHG
RIGHT VENTRICULAR END-DIASTOLIC DIMENSION: 1.74 CM
RV TISSUE DOPPLER FREE WALL SYSTOLIC VELOCITY 1 (APICAL 4 CHAMBER VIEW): 10.27 CM/S
TDI LATERAL: 0.1 M/S
TDI SEPTAL: 0.06 M/S
TDI: 0.08 M/S
TRICUSPID ANNULAR PLANE SYSTOLIC EXCURSION: 1.87 CM
Z-SCORE OF LEFT VENTRICULAR DIMENSION IN END DIASTOLE: -4.52
Z-SCORE OF LEFT VENTRICULAR DIMENSION IN END SYSTOLE: -1.92

## 2024-01-02 PROCEDURE — 93306 TTE W/DOPPLER COMPLETE: CPT

## 2024-01-02 PROCEDURE — 93306 TTE W/DOPPLER COMPLETE: CPT | Mod: 26,,, | Performed by: INTERNAL MEDICINE

## 2024-01-08 DIAGNOSIS — F90.0 ATTENTION DEFICIT HYPERACTIVITY DISORDER (ADHD), PREDOMINANTLY INATTENTIVE TYPE: ICD-10-CM

## 2024-01-08 RX ORDER — DEXTROAMPHETAMINE SACCHARATE, AMPHETAMINE ASPARTATE, DEXTROAMPHETAMINE SULFATE AND AMPHETAMINE SULFATE 2.5; 2.5; 2.5; 2.5 MG/1; MG/1; MG/1; MG/1
10 TABLET ORAL DAILY
Qty: 30 TABLET | Refills: 0 | Status: SHIPPED | OUTPATIENT
Start: 2024-01-08 | End: 2024-02-08 | Stop reason: SDUPTHER

## 2024-01-09 ENCOUNTER — TELEPHONE (OUTPATIENT)
Dept: CARDIOLOGY | Facility: CLINIC | Age: 44
End: 2024-01-09
Payer: MEDICAID

## 2024-01-09 DIAGNOSIS — E78.00 HYPERCHOLESTEREMIA: Primary | ICD-10-CM

## 2024-01-09 DIAGNOSIS — C81.92 HODGKIN LYMPHOMA OF INTRATHORACIC LYMPH NODES, UNSPECIFIED HODGKIN LYMPHOMA TYPE: ICD-10-CM

## 2024-01-09 DIAGNOSIS — E03.2 HYPOTHYROIDISM DUE TO NON-MEDICATION EXOGENOUS SUBSTANCES: ICD-10-CM

## 2024-01-11 ENCOUNTER — OFFICE VISIT (OUTPATIENT)
Dept: CARDIOLOGY | Facility: CLINIC | Age: 44
End: 2024-01-11
Payer: MEDICAID

## 2024-01-11 VITALS
HEART RATE: 109 BPM | WEIGHT: 208.31 LBS | HEIGHT: 74 IN | BODY MASS INDEX: 26.73 KG/M2 | DIASTOLIC BLOOD PRESSURE: 72 MMHG | SYSTOLIC BLOOD PRESSURE: 122 MMHG | OXYGEN SATURATION: 98 %

## 2024-01-11 DIAGNOSIS — I35.0 NONRHEUMATIC AORTIC VALVE STENOSIS: ICD-10-CM

## 2024-01-11 DIAGNOSIS — E89.0 POSTABLATIVE HYPOTHYROIDISM: ICD-10-CM

## 2024-01-11 DIAGNOSIS — I35.0 AORTIC VALVE STENOSIS, ETIOLOGY OF CARDIAC VALVE DISEASE UNSPECIFIED: Primary | ICD-10-CM

## 2024-01-11 DIAGNOSIS — E78.00 HYPERCHOLESTEROLEMIA: ICD-10-CM

## 2024-01-11 DIAGNOSIS — Z88.8 ALLERGY TO IODINE: ICD-10-CM

## 2024-01-11 PROCEDURE — 99213 OFFICE O/P EST LOW 20 MIN: CPT | Mod: S$PBB,,, | Performed by: INTERNAL MEDICINE

## 2024-01-11 PROCEDURE — 99999 PR PBB SHADOW E&M-EST. PATIENT-LVL III: CPT | Mod: PBBFAC,,, | Performed by: INTERNAL MEDICINE

## 2024-01-11 PROCEDURE — 3074F SYST BP LT 130 MM HG: CPT | Mod: CPTII,,, | Performed by: INTERNAL MEDICINE

## 2024-01-11 PROCEDURE — 93010 ELECTROCARDIOGRAM REPORT: CPT | Mod: S$PBB,,, | Performed by: INTERNAL MEDICINE

## 2024-01-11 PROCEDURE — 99213 OFFICE O/P EST LOW 20 MIN: CPT | Mod: PBBFAC,PN | Performed by: INTERNAL MEDICINE

## 2024-01-11 PROCEDURE — 3078F DIAST BP <80 MM HG: CPT | Mod: CPTII,,, | Performed by: INTERNAL MEDICINE

## 2024-01-11 PROCEDURE — 3008F BODY MASS INDEX DOCD: CPT | Mod: CPTII,,, | Performed by: INTERNAL MEDICINE

## 2024-01-11 PROCEDURE — 1160F RVW MEDS BY RX/DR IN RCRD: CPT | Mod: CPTII,,, | Performed by: INTERNAL MEDICINE

## 2024-01-11 PROCEDURE — 93005 ELECTROCARDIOGRAM TRACING: CPT | Mod: PBBFAC,PN | Performed by: INTERNAL MEDICINE

## 2024-01-11 PROCEDURE — 1159F MED LIST DOCD IN RCRD: CPT | Mod: CPTII,,, | Performed by: INTERNAL MEDICINE

## 2024-01-11 NOTE — PROGRESS NOTES
Patient ID:  Michael Hart is a 43 y.o. male who presents for follow-up of Aortic Stenosis and Results (echo)       Jose is here for evaluation of his aortic valve.  He was diagnosed of having Hodgkin lymphoma in 1998.  He had multiple surgery as well as chemotherapy and radiation to the chest.  He has aortic stenosis as well as aortic insufficiency it was thought that he is aortic lesion is mostly secondary to his radiation and chemotherapy.  Ever since his incident with lymphoma he has been tachycardic his resting heart rate is usually in the 80s.  For the past 9 months he has been taking Adderall XR.  He feels much better when he takes a medication.  He also drinks about 6 cups of coffee a day.  He is very active doing a lot of biking and [place] he denies any symptoms when he is doing any of these activities.  He has been on atorvastatin 10 mg every other day as well as ezetimibe 10 mg p.o. daily.  The echocardiogram were review it appears that he is aortic insufficiency is more pronounced now than before with an E point septal separation of 1.7.  The deterioration could be secondary to the national history of the valve versus recently started the medication of Adderall.  Will obtain an echocardiogram in approximately 6 months to re-evaluate the valve.        Past Medical History:   Diagnosis Date    Enlarged lymph node 10/2021    Hodgkin lymphoma 1998    Hypothyroidism         Past Surgical History:   Procedure Laterality Date    BIOPSY OF INGUINAL LYMPH NODE Right 10/22/2021    Procedure: BIOPSY, LYMPH NODE, INGUINAL;  Surgeon: Trey Lora III, MD;  Location: [facility];  Service: General;  Laterality: Right;  right inguinal lymph node biopsy    HERNIA REPAIR      as a child    RHINOPLASTY      TONSILLECTOMY      turmor removed from chest      also pericardium           Current Outpatient Medications   Medication Instructions    atorvastatin (LIPITOR) 10 mg, Oral, Daily     "dextroamphetamine-amphetamine (ADDERALL) 10 mg Tab 10 mg, Oral, Daily    ezetimibe (ZETIA) 10 mg, Oral, Daily    hydrocortisone (ANUSOL-HC) 25 mg, Rectal, 2 times daily    hydrocortisone-pramoxine (PROCTOFOAM-HS) rectal foam 1 applicator, Rectal, 2 times daily    levothyroxine (SYNTHROID) 100 MCG tablet TAKE 1 TABLET BY MOUTH EVERY DAY        Review of patient's allergies indicates:   Allergen Reactions    Ciprofloxacin Hives and Itching    Iodine and iodide containing products Itching     And IV contrast          Review of Systems   Cardiovascular:  Negative for chest pain, dyspnea on exertion and palpitations.   Respiratory:  Negative for cough and shortness of breath.         Objective:     Vitals:    01/11/24 1037   BP: 122/72   BP Location: Left arm   Patient Position: Sitting   BP Method: Medium (Manual)   Pulse: 109   SpO2: 98%   Weight: 94.5 kg (208 lb 5.4 oz)   Height: 6' 2" (1.88 m)       Physical Exam  Vitals and nursing note reviewed.   Constitutional:       Appearance: He is well-developed.   HENT:      Head: Normocephalic and atraumatic.   Eyes:      Conjunctiva/sclera: Conjunctivae normal.   Cardiovascular:      Rate and Rhythm: Normal rate and regular rhythm.      Heart sounds: Murmur (Grade 3/6 systolic murmur at the apex grade 2 over 6 diastolic murmur at the left sternal border) heard.   Pulmonary:      Effort: Pulmonary effort is normal.      Breath sounds: Normal breath sounds.   Abdominal:      General: Bowel sounds are normal.      Palpations: Abdomen is soft.   Musculoskeletal:         General: Normal range of motion.   Skin:     General: Skin is warm and dry.   Neurological:      Mental Status: He is alert and oriented to person, place, and time.   Psychiatric:         Behavior: Behavior normal.         Thought Content: Thought content normal.         Judgment: Judgment normal.       CMP  Sodium   Date Value Ref Range Status   07/18/2023 139 136 - 145 mmol/L Final     Potassium   Date Value " Ref Range Status   07/18/2023 4.1 3.5 - 5.1 mmol/L Final     Chloride   Date Value Ref Range Status   07/18/2023 103 95 - 110 mmol/L Final     CO2   Date Value Ref Range Status   07/18/2023 26 23 - 29 mmol/L Final     Glucose   Date Value Ref Range Status   07/18/2023 110 70 - 110 mg/dL Final     BUN   Date Value Ref Range Status   07/18/2023 11 6 - 20 mg/dL Final     Creatinine   Date Value Ref Range Status   07/18/2023 0.9 0.5 - 1.4 mg/dL Final     Calcium   Date Value Ref Range Status   07/18/2023 9.1 8.7 - 10.5 mg/dL Final     Total Protein   Date Value Ref Range Status   03/28/2023 7.2 6.0 - 8.4 g/dL Final     Albumin   Date Value Ref Range Status   03/28/2023 4.3 3.5 - 5.2 g/dL Final     Total Bilirubin   Date Value Ref Range Status   03/28/2023 0.9 0.1 - 1.0 mg/dL Final     Comment:     For infants and newborns, interpretation of results should be based  on gestational age, weight and in agreement with clinical  observations.    Premature Infant recommended reference ranges:  Up to 24 hours.............<8.0 mg/dL  Up to 48 hours............<12.0 mg/dL  3-5 days..................<15.0 mg/dL  6-29 days.................<15.0 mg/dL       Alkaline Phosphatase   Date Value Ref Range Status   03/28/2023 46 (L) 55 - 135 U/L Final     AST   Date Value Ref Range Status   03/28/2023 18 10 - 40 U/L Final     ALT   Date Value Ref Range Status   03/28/2023 20 10 - 44 U/L Final     Anion Gap   Date Value Ref Range Status   07/18/2023 10 8 - 16 mmol/L Final     eGFR if    Date Value Ref Range Status   05/06/2022 117 > OR = 60 mL/min/1.73m2 Final     eGFR if non    Date Value Ref Range Status   05/06/2022 101 > OR = 60 mL/min/1.73m2 Final      BMP  Lab Results   Component Value Date     07/18/2023    K 4.1 07/18/2023     07/18/2023    CO2 26 07/18/2023    BUN 11 07/18/2023    CREATININE 0.9 07/18/2023    CALCIUM 9.1 07/18/2023    ANIONGAP 10 07/18/2023    ESTGFRAFRICA 117 05/06/2022     EGFRNONAA 101 05/06/2022      BNP  @LABRCNTIP(BNP,BNPTRIAGEBLO)@   Lab Results   Component Value Date    CHOL 165 07/18/2023    CHOL 198 05/06/2022    CHOL 193 10/26/2021     Lab Results   Component Value Date    HDL 52 07/18/2023    HDL 50 05/06/2022    HDL 37 (L) 10/26/2021     Lab Results   Component Value Date    LDLCALC 97.6 07/18/2023    LDLCALC 126 (H) 05/06/2022    LDLCALC 116.4 10/26/2021     Lab Results   Component Value Date    TRIG 77 07/18/2023    TRIG 114 05/06/2022    TRIG 198 (H) 10/26/2021     Lab Results   Component Value Date    CHOLHDL 31.5 07/18/2023    CHOLHDL 4.0 05/06/2022    CHOLHDL 19.2 (L) 10/26/2021      Lab Results   Component Value Date    TSH 0.204 (L) 07/18/2023    FREET4 1.13 07/18/2023     Lab Results   Component Value Date    HGBA1C 5.0 10/26/2021     Lab Results   Component Value Date    WBC 5.77 03/28/2023    HGB 15.8 03/28/2023    HCT 45.9 03/28/2023    MCV 90 03/28/2023     03/28/2023         Results for orders placed during the hospital encounter of 01/02/24    Echo    Interpretation Summary    Left Ventricle: The left ventricle is normal in size. Normal wall thickness. Normal wall motion. There is low normal systolic function. Ejection fraction by visual approximation is 50%. Grade I diastolic dysfunction.    Right Ventricle: Normal right ventricular cavity size. Wall thickness is normal. Right ventricle wall motion  is normal. Systolic function is normal.    Aortic Valve: The aortic valve is a trileaflet valve. There is moderate aortic valve sclerosis. Moderately restricted motion. There is mild to moderate stenosis. Aortic valve area by VTI is 1.49 cm². Aortic valve peak velocity is 2.38 m/s. Mean gradient is 14 mmHg. The dimensionless index is 0.46. There is mild to moderate aortic regurgitation.    IVC/SVC: Normal venous pressure at 3 mmHg.    EPSS 1.7 cm.     No results found for this or any previous visit.         Assessment:       Nonrheumatic aortic valve  stenosis  Concern if the Adderall is causing any deterioration of the valve.  Will perform an echocardiogram in 6 months.  This has been discussed with the patient.  Status post radiation and chemotherapy of the thoracic cavity    Hypercholesterolemia  On 10 mg of atorvastatin every other day and ezetimibe    Postablative hypothyroidism  On thyroid replacement    Allergy to iodine  Allergy to IV contrast       Plan:       Obtain baseline blood work.  Continue the current medical therapy.  He will be seen in the office in 6 months an echocardiogram will be obtained prior to the visit.

## 2024-01-11 NOTE — ASSESSMENT & PLAN NOTE
Concern if the Adderall is causing any deterioration of the valve.  Will perform an echocardiogram in 6 months.  This has been discussed with the patient.  Status post radiation and chemotherapy of the thoracic cavity

## 2024-02-08 DIAGNOSIS — F90.0 ATTENTION DEFICIT HYPERACTIVITY DISORDER (ADHD), PREDOMINANTLY INATTENTIVE TYPE: ICD-10-CM

## 2024-02-08 RX ORDER — DEXTROAMPHETAMINE SACCHARATE, AMPHETAMINE ASPARTATE, DEXTROAMPHETAMINE SULFATE AND AMPHETAMINE SULFATE 2.5; 2.5; 2.5; 2.5 MG/1; MG/1; MG/1; MG/1
10 TABLET ORAL DAILY
Qty: 30 TABLET | Refills: 0 | Status: SHIPPED | OUTPATIENT
Start: 2024-02-08 | End: 2024-03-19 | Stop reason: SDUPTHER

## 2024-03-19 ENCOUNTER — OFFICE VISIT (OUTPATIENT)
Dept: FAMILY MEDICINE | Facility: CLINIC | Age: 44
End: 2024-03-19
Payer: MEDICAID

## 2024-03-19 VITALS
HEART RATE: 72 BPM | DIASTOLIC BLOOD PRESSURE: 78 MMHG | WEIGHT: 206 LBS | BODY MASS INDEX: 26.44 KG/M2 | HEIGHT: 74 IN | SYSTOLIC BLOOD PRESSURE: 124 MMHG

## 2024-03-19 DIAGNOSIS — F90.0 ATTENTION DEFICIT HYPERACTIVITY DISORDER (ADHD), PREDOMINANTLY INATTENTIVE TYPE: ICD-10-CM

## 2024-03-19 LAB
ALBUMIN SERPL-MCNC: 4.3 G/DL (ref 3.6–5.1)
ALBUMIN/GLOB SERPL: 2 (CALC) (ref 1–2.5)
ALP SERPL-CCNC: 49 U/L (ref 36–130)
ALT SERPL-CCNC: 19 U/L (ref 9–46)
AST SERPL-CCNC: 17 U/L (ref 10–40)
BASOPHILS # BLD AUTO: 42 CELLS/UL (ref 0–200)
BASOPHILS NFR BLD AUTO: 0.8 %
BILIRUB SERPL-MCNC: 0.6 MG/DL (ref 0.2–1.2)
BUN SERPL-MCNC: 12 MG/DL (ref 7–25)
BUN/CREAT SERPL: NORMAL (CALC) (ref 6–22)
CALCIUM SERPL-MCNC: 9.1 MG/DL (ref 8.6–10.3)
CHLORIDE SERPL-SCNC: 104 MMOL/L (ref 98–110)
CHOLEST SERPL-MCNC: 120 MG/DL
CHOLEST/HDLC SERPL: 2.8 (CALC)
CO2 SERPL-SCNC: 28 MMOL/L (ref 20–32)
CREAT SERPL-MCNC: 0.96 MG/DL (ref 0.6–1.29)
EGFR: 99 ML/MIN/1.73M2
EOSINOPHIL # BLD AUTO: 159 CELLS/UL (ref 15–500)
EOSINOPHIL NFR BLD AUTO: 3 %
ERYTHROCYTE [DISTWIDTH] IN BLOOD BY AUTOMATED COUNT: 12 % (ref 11–15)
GLOBULIN SER CALC-MCNC: 2.2 G/DL (CALC) (ref 1.9–3.7)
GLUCOSE SERPL-MCNC: 98 MG/DL (ref 65–99)
HCT VFR BLD AUTO: 46.8 % (ref 38.5–50)
HDLC SERPL-MCNC: 43 MG/DL
HGB BLD-MCNC: 15.9 G/DL (ref 13.2–17.1)
LDLC SERPL CALC-MCNC: 56 MG/DL (CALC)
LYMPHOCYTES # BLD AUTO: 1198 CELLS/UL (ref 850–3900)
LYMPHOCYTES NFR BLD AUTO: 22.6 %
MCH RBC QN AUTO: 31.1 PG (ref 27–33)
MCHC RBC AUTO-ENTMCNC: 34 G/DL (ref 32–36)
MCV RBC AUTO: 91.4 FL (ref 80–100)
MONOCYTES # BLD AUTO: 461 CELLS/UL (ref 200–950)
MONOCYTES NFR BLD AUTO: 8.7 %
NEUTROPHILS # BLD AUTO: 3440 CELLS/UL (ref 1500–7800)
NEUTROPHILS NFR BLD AUTO: 64.9 %
NONHDLC SERPL-MCNC: 77 MG/DL (CALC)
PLATELET # BLD AUTO: 214 THOUSAND/UL (ref 140–400)
PMV BLD REES-ECKER: 10.2 FL (ref 7.5–12.5)
POTASSIUM SERPL-SCNC: 5 MMOL/L (ref 3.5–5.3)
PROT SERPL-MCNC: 6.5 G/DL (ref 6.1–8.1)
RBC # BLD AUTO: 5.12 MILLION/UL (ref 4.2–5.8)
SODIUM SERPL-SCNC: 140 MMOL/L (ref 135–146)
TRIGL SERPL-MCNC: 124 MG/DL
TSH SERPL-ACNC: 2.3 MIU/L (ref 0.4–4.5)
WBC # BLD AUTO: 5.3 THOUSAND/UL (ref 3.8–10.8)

## 2024-03-19 PROCEDURE — 3074F SYST BP LT 130 MM HG: CPT | Mod: CPTII,95,, | Performed by: FAMILY MEDICINE

## 2024-03-19 PROCEDURE — 3008F BODY MASS INDEX DOCD: CPT | Mod: CPTII,95,, | Performed by: FAMILY MEDICINE

## 2024-03-19 PROCEDURE — 3078F DIAST BP <80 MM HG: CPT | Mod: CPTII,95,, | Performed by: FAMILY MEDICINE

## 2024-03-19 PROCEDURE — 99213 OFFICE O/P EST LOW 20 MIN: CPT | Mod: 95,,, | Performed by: FAMILY MEDICINE

## 2024-03-19 PROCEDURE — 1159F MED LIST DOCD IN RCRD: CPT | Mod: CPTII,95,, | Performed by: FAMILY MEDICINE

## 2024-03-19 PROCEDURE — 1160F RVW MEDS BY RX/DR IN RCRD: CPT | Mod: CPTII,95,, | Performed by: FAMILY MEDICINE

## 2024-03-19 RX ORDER — DEXTROAMPHETAMINE SACCHARATE, AMPHETAMINE ASPARTATE, DEXTROAMPHETAMINE SULFATE AND AMPHETAMINE SULFATE 2.5; 2.5; 2.5; 2.5 MG/1; MG/1; MG/1; MG/1
10 TABLET ORAL DAILY
Qty: 30 TABLET | Refills: 0 | Status: SHIPPED | OUTPATIENT
Start: 2024-03-19 | End: 2024-04-22 | Stop reason: SDUPTHER

## 2024-03-19 NOTE — PROGRESS NOTES
Established Patient - Audio Only Telehealth Visit     The patient location is: [place]  The chief complaint leading to consultation is: F/u ADD  Visit type: Virtual visit with audio only (telephone)  Total time spent with patient: 15'       The reason for the audio only service rather than synchronous audio and video virtual visit was related to technical difficulties or patient preference/necessity.     Each patient to whom I provide medical services by telemedicine is:  (1) informed of the relationship between the physician and patient and the respective role of any other health care provider with respect to management of the patient; and (2) notified that they may decline to receive medical services by telemedicine and may withdraw from such care at any time. Patient verbally consented to receive this service via voice-only telephone call.       HPI: On low dose Adderall for years with good results for focus with work and no apparent side effects.  He has a remote Hx of Hodgkins Lymphoma in late teens and treatment included radiation.  He has some Aortic Valve Stenosis and is followed by Dr Khan; had Echo in 12/23 and due to repeat in 6 months as there is some concern re the Adderall.     Assessment and plan:  Will RF the Adderall 10 mg and Jose will try taking one-half pill per day for now.                         This service was not originating from a related E/M service provided within the previous 7 days nor will  to an E/M service or procedure within the next 24 hours or my soonest available appointment.  Prevailing standard of care was able to be met in this audio-only visit.

## 2024-04-12 DIAGNOSIS — F90.0 ATTENTION DEFICIT HYPERACTIVITY DISORDER (ADHD), PREDOMINANTLY INATTENTIVE TYPE: ICD-10-CM

## 2024-04-12 DIAGNOSIS — I35.0 NONRHEUMATIC AORTIC VALVE STENOSIS: ICD-10-CM

## 2024-04-12 DIAGNOSIS — I35.0 AORTIC VALVE STENOSIS, ETIOLOGY OF CARDIAC VALVE DISEASE UNSPECIFIED: Primary | ICD-10-CM

## 2024-04-22 RX ORDER — DEXTROAMPHETAMINE SACCHARATE, AMPHETAMINE ASPARTATE, DEXTROAMPHETAMINE SULFATE AND AMPHETAMINE SULFATE 2.5; 2.5; 2.5; 2.5 MG/1; MG/1; MG/1; MG/1
10 TABLET ORAL DAILY
Qty: 30 TABLET | Refills: 0 | Status: SHIPPED | OUTPATIENT
Start: 2024-04-22 | End: 2024-05-22 | Stop reason: SDUPTHER

## 2024-05-22 DIAGNOSIS — F90.0 ATTENTION DEFICIT HYPERACTIVITY DISORDER (ADHD), PREDOMINANTLY INATTENTIVE TYPE: ICD-10-CM

## 2024-05-22 RX ORDER — DEXTROAMPHETAMINE SACCHARATE, AMPHETAMINE ASPARTATE, DEXTROAMPHETAMINE SULFATE AND AMPHETAMINE SULFATE 2.5; 2.5; 2.5; 2.5 MG/1; MG/1; MG/1; MG/1
10 TABLET ORAL DAILY
Qty: 30 TABLET | Refills: 0 | Status: SHIPPED | OUTPATIENT
Start: 2024-05-22

## 2024-06-03 ENCOUNTER — OFFICE VISIT (OUTPATIENT)
Facility: CLINIC | Age: 44
End: 2024-06-03
Payer: MEDICAID

## 2024-06-03 VITALS
SYSTOLIC BLOOD PRESSURE: 159 MMHG | HEIGHT: 74 IN | HEART RATE: 105 BPM | DIASTOLIC BLOOD PRESSURE: 75 MMHG | WEIGHT: 202.88 LBS | TEMPERATURE: 98 F | RESPIRATION RATE: 16 BRPM | BODY MASS INDEX: 26.04 KG/M2

## 2024-06-03 DIAGNOSIS — Z85.71 HX OF HODGKIN'S DISEASE: ICD-10-CM

## 2024-06-03 DIAGNOSIS — R59.0 LYMPHADENOPATHY, INGUINAL: Primary | ICD-10-CM

## 2024-06-03 PROCEDURE — 99215 OFFICE O/P EST HI 40 MIN: CPT | Mod: S$PBB,,, | Performed by: INTERNAL MEDICINE

## 2024-06-03 PROCEDURE — G2211 COMPLEX E/M VISIT ADD ON: HCPCS | Mod: S$PBB,,, | Performed by: INTERNAL MEDICINE

## 2024-06-03 PROCEDURE — 3008F BODY MASS INDEX DOCD: CPT | Mod: CPTII,,, | Performed by: INTERNAL MEDICINE

## 2024-06-03 PROCEDURE — 3078F DIAST BP <80 MM HG: CPT | Mod: CPTII,,, | Performed by: INTERNAL MEDICINE

## 2024-06-03 PROCEDURE — 99999 PR PBB SHADOW E&M-EST. PATIENT-LVL III: CPT | Mod: PBBFAC,,, | Performed by: INTERNAL MEDICINE

## 2024-06-03 PROCEDURE — 99213 OFFICE O/P EST LOW 20 MIN: CPT | Mod: PBBFAC,PN | Performed by: INTERNAL MEDICINE

## 2024-06-03 PROCEDURE — 1159F MED LIST DOCD IN RCRD: CPT | Mod: CPTII,,, | Performed by: INTERNAL MEDICINE

## 2024-06-03 PROCEDURE — 3077F SYST BP >= 140 MM HG: CPT | Mod: CPTII,,, | Performed by: INTERNAL MEDICINE

## 2024-06-03 NOTE — PROGRESS NOTES
[facility] [address] Hematology Oncology In Office subsequent Encounter Note    6/3/24    Subjective:      Patient ID:   Michael Morley  44 y.o. male  1980   MD Michael Muhammad MD      Chief Complaint:    Enlarged Lymph node     HPI:  44 y.o. male was referred to myself per Dr. Michael MORLEY for the evaluation of an enlarged right inguinal lymph node that had been present for approximately 3 weeks.  Patient denies fever, night sweats, weight loss.  He does not have B symptoms.  At age 17 he was diagnosed with Hodgkin's disease.  He had a large mediastinal mass.  Initial biopsy per Dr. Barry was negative for malignancy.  Thereafter he was referred to [facility] and had thoracotomy there and was found to have Hodgkin's disease.  He was treated with combination chemotherapy, I believe his regimen was ABVD x6 cycles.  His treatment was complicated by the development of chest pain symptoms and I believe pericarditis with the administration of Adriamycin.  His treatment was given to Oncology at [facility].  Thereafter he went to [facility] in [place] for his adjuvant radiation therapy to the chest.  He achieved complete remission status and was followed without lymphoma recurrence.    I had seen him at the time of his original diagnosis but did not participate in his care after he went to [facility] and [facility].  He had an office chart here however it has been destroyed after several years of inactivity on the case file.  The history I am giving above is based on my recollection and based on his information given to me today.    He lives in [place].  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    He does not smoke, he does not drink alcohol,  per the chart he is allergic to Cipro.  He tells me that he does have mild allergy to iodine manifested with itching.    He has hypothyroidism and is on Synthroid.  This was  acquired after he received previous chest irradiation.    His father has psoriasis.  His mother has valvular heart disease.  He has a sister and brother alive and well.  He does not have children.    LN Bx at R inguinal area was reactive LN, no cancer seen.  Hodgkin's Dx was around age 19-20, now age 43, 25 years since HD dx.  C/O nodule or mass at L inguinal area again.  Denies Fever, NS, decreased appetite or decreased weight.  No B sx.    U/S of inguinal area was negative.        ROS:   GEN: normal without any fever, night sweats or weight loss  HEENT: normal with no HA's, sore throat, stiff neck, changes in vision  CV: See HPI  PULM: normal with no SOB, cough, hemoptysis, sputum or pleuritic pain  GI: normal with no abdominal pain, nausea, vomiting, constipation, diarrhea, melanotic stools, BRBPR, or hematemesis  : normal with no hematuria, dysuria  BREAST: normal with no mass, discharge, pain  SKIN: normal with no rash, erythema, bruising, or swelling     Past Medical History:   Diagnosis Date    Enlarged lymph node 10/2021    Hodgkin lymphoma 1998    Hypothyroidism      Past Surgical History:   Procedure Laterality Date    BIOPSY OF INGUINAL LYMPH NODE Right 10/22/2021    Procedure: BIOPSY, LYMPH NODE, INGUINAL;  Surgeon: Trey Lora III, MD;  Location: [facility];  Service: General;  Laterality: Right;  right inguinal lymph node biopsy    HERNIA REPAIR      as a child    RHINOPLASTY      TONSILLECTOMY      turmor removed from chest      also pericardium        Review of patient's allergies indicates:   Allergen Reactions    Ciprofloxacin Hives and Itching    Iodine and iodide containing products Itching     And IV contrast       Social History     Socioeconomic History    Marital status: Single   Tobacco Use    Smoking status: Never    Smokeless tobacco: Never   Substance and Sexual Activity    Alcohol use: Yes     Alcohol/week: 1.0 standard drink of alcohol     Types: 1 Glasses of wine per week      Comment: nightly    Drug use: Never     Social Determinants of Health     Financial Resource Strain: Patient Declined (12/5/2023)    Overall Financial Resource Strain (CARDIA)     Difficulty of Paying Living Expenses: Patient declined   Food Insecurity: Patient Declined (12/5/2023)    Hunger Vital Sign     Worried About Running Out of Food in the Last Year: Patient declined     Ran Out of Food in the Last Year: Patient declined   Transportation Needs: Patient Declined (12/5/2023)    PRAPARE - Transportation     Lack of Transportation (Medical): Patient declined     Lack of Transportation (Non-Medical): Patient declined   Physical Activity: Sufficiently Active (12/5/2023)    Exercise Vital Sign     Days of Exercise per Week: 5 days     Minutes of Exercise per Session: 40 min   Stress: No Stress Concern Present (12/5/2023)    [employer] - Occupational Stress Questionnaire     Feeling of Stress : Not at all   Housing Stability: Unknown (12/5/2023)    Housing Stability Vital Sign     Unable to Pay for Housing in the Last Year: Patient refused     Unstable Housing in the Last Year: Patient refused         Current Outpatient Medications:     atorvastatin (LIPITOR) 10 MG tablet, Take 1 tablet (10 mg total) by mouth once daily., Disp: 90 tablet, Rfl: 3    dextroamphetamine-amphetamine (ADDERALL) 10 mg Tab, Take 1 tablet (10 mg total) by mouth once daily., Disp: 30 tablet, Rfl: 0    ezetimibe (ZETIA) 10 mg tablet, Take 1 tablet (10 mg total) by mouth once daily., Disp: 90 tablet, Rfl: 3    hydrocortisone (ANUSOL-HC) 25 mg suppository, Place 25 mg rectally 2 (two) times daily., Disp: , Rfl:     hydrocortisone-pramoxine (PROCTOFOAM-HS) rectal foam, Place 1 applicator rectally 2 (two) times daily., Disp: 20 g, Rfl: 3    levothyroxine (SYNTHROID) 100 MCG tablet, TAKE 1 TABLET BY MOUTH EVERY DAY, Disp: 30 tablet, Rfl: 20          Objective:   Vitals:  Blood pressure (!) 159/75, pulse 105, temperature  "98.2 °F (36.8 °C), resp. rate 16, height 6' 2" (1.88 m), weight 92 kg (202 lb 14.4 oz).    Physical Examination:   GEN: no apparent distress, comfortable  HEAD: atraumatic and normocephalic  EYES: no pallor, no icterus  ENT: no pharyngeal erythema, external ears WNL; no nasal discharge; no thrush  NECK: no masses, thyroid normal, trachea midline, no LAD/LN's, supple  CV: RRR with no murmur; normal pulse  CHEST: Normal respiratory effort; CTAB; normal breath sounds; no wheeze or crackles  ABDOM: nontender and nondistended; soft;  no rebound/guarding, L/S NP  MUSC/Skeletal: ROM normal; no crepitus; joints normal; no deformities   EXTREM: no clubbing, cyanosis, inflammation or swelling.  He has varicosities noted at the R leg.  SKIN: no rashes, lesions, ulcers, petechiae   : Not examined on this visit.  He kept genitals covered and in place with gown  NEURO: grossly intact; motor/sensory WNL;  no tremors  PSYCH: normal mood, affect and behavior  LYMPH: normal cervical, supraclavicular, axillary and L groin LN's.  At the R inguinal area, he has a firm movable 2 x 1 cm LN on exam, evident when he is standing.  Unchanged from the last exam 6 months ago.  Breasts: L & R no palpable mass    Re eval of LN status, PET scan 7/2024, he is out of town during the month of 6/2024.  If PET shows increased lymphadenopathy or if R inguinal LN is hypermetabolic, then proceed with LN Bx.  RTC 6 weeks.      Labs:   Lab Results   Component Value Date    WBC 5.3 03/18/2024    HGB 15.9 03/18/2024    HCT 46.8 03/18/2024    MCV 91.4 03/18/2024     03/18/2024    CMP  Sodium   Date Value Ref Range Status   03/18/2024 140 135 - 146 mmol/L Final     Potassium   Date Value Ref Range Status   03/18/2024 5.0 3.5 - 5.3 mmol/L Final     Chloride   Date Value Ref Range Status   03/18/2024 104 98 - 110 mmol/L Final     CO2   Date Value Ref Range Status   03/18/2024 28 20 - 32 mmol/L Final     Glucose   Date Value Ref Range Status   03/18/2024 " 98 65 - 99 mg/dL Final     Comment:                   Fasting reference interval          BUN   Date Value Ref Range Status   03/18/2024 12 7 - 25 mg/dL Final     Creatinine   Date Value Ref Range Status   03/18/2024 0.96 0.60 - 1.29 mg/dL Final     Calcium   Date Value Ref Range Status   03/18/2024 9.1 8.6 - 10.3 mg/dL Final     Total Protein   Date Value Ref Range Status   03/18/2024 6.5 6.1 - 8.1 g/dL Final     Albumin   Date Value Ref Range Status   03/18/2024 4.3 3.6 - 5.1 g/dL Final     Total Bilirubin   Date Value Ref Range Status   03/18/2024 0.6 0.2 - 1.2 mg/dL Final     Alkaline Phosphatase   Date Value Ref Range Status   03/28/2023 46 (L) 55 - 135 U/L Final     AST   Date Value Ref Range Status   03/18/2024 17 10 - 40 U/L Final     ALT   Date Value Ref Range Status   03/18/2024 19 9 - 46 U/L Final     Anion Gap   Date Value Ref Range Status   07/18/2023 10 8 - 16 mmol/L Final     eGFR if    Date Value Ref Range Status   05/06/2022 117 > OR = 60 mL/min/1.73m2 Final     eGFR if non    Date Value Ref Range Status   05/06/2022 101 > OR = 60 mL/min/1.73m2 Final     Ordered CBC, CMP, TSH, T4, AFP, B-HCG    Assessment:   (1) 44 y.o. male with Hodgkins Dx at age 17, treated with Rad Rx adjuvantly after  combination chemotherapy, achieving CR.    (2)Hx 2 cm firm movable R inguinal LN.  He has hx of R inguinal hernia repair at age 1.  He has been building a boat at home, moving 3 hulls.    (3)Adriamycin hx.  Ej Fx 53% 10/25/22.    (4)PET SATISH 10/21    (5)R inguinal LN Bx showed reactive LN, no cancer seen.    (6)He has another R inguinal LN on exam , 3 cm x's 1 cm on exam, evident on his standing.    (7)Repeat PET ordered, for re eval of  recurrent vR inguinal LN..    (8)If PET shows increasing LN or if PET is hypermetabolic at R inguinal area, then Bx of R inguinal LN  He is traveling 6/2024.  Schedule PET 7/2024.  RTC 7/2024.

## 2024-06-26 DIAGNOSIS — F90.0 ATTENTION DEFICIT HYPERACTIVITY DISORDER (ADHD), PREDOMINANTLY INATTENTIVE TYPE: ICD-10-CM

## 2024-06-26 RX ORDER — DEXTROAMPHETAMINE SACCHARATE, AMPHETAMINE ASPARTATE, DEXTROAMPHETAMINE SULFATE AND AMPHETAMINE SULFATE 2.5; 2.5; 2.5; 2.5 MG/1; MG/1; MG/1; MG/1
10 TABLET ORAL DAILY
Qty: 30 TABLET | Refills: 0 | Status: SHIPPED | OUTPATIENT
Start: 2024-06-26

## 2024-07-02 ENCOUNTER — TELEPHONE (OUTPATIENT)
Dept: CARDIOLOGY | Facility: CLINIC | Age: 44
End: 2024-07-02
Payer: MEDICAID

## 2024-07-02 DIAGNOSIS — R55 SYNCOPE AND COLLAPSE: Primary | ICD-10-CM

## 2024-07-02 NOTE — TELEPHONE ENCOUNTER
----- Message from Gilmer Vera sent at 7/2/2024  9:38 AM CDT -----  Regarding: order  Contact: patient  Type:  Patient Returning Call    Who Called:patient  Who Left Message for Patient:office nurse  Does the patient know what this is regarding?:new order for EKG to be sent to [facility] on [address] [facility]# [phone]  Would the patient rather a call back or a response via [facility]? Please let patient know  Best Call Back Number:[phone]  Additional Information:

## 2024-07-02 NOTE — TELEPHONE ENCOUNTER
The auth for the ECHO (CUPID ONLY) has been denied.   A peer to peer can be done by calling [employer] at [phone]. Please use tracking number [id number].   I have spoken with the pt.     Denial Rationale   · What was given to us in your doctor's notes or phone call does not meet [employer]'s Guidelines for a(n) Transthoracic Echocardiogram.   · Your doctor said you have a heart valve problem.   · According to [employer] Clinical Guideline 067 for Transthoracic Echocardiogram we need doctor's notes that say the reason this test is being done again so soon (less than 1 year) before we can review for an approval.   · From the doctor's notes and/or phone call, we have not received the information needed.     Thank you,   Brina

## 2024-07-02 NOTE — TELEPHONE ENCOUNTER
S/w pt he wants the echo put back on. Advised we will need to do a peer to peer to get it approved with insurance instead of out of pocket

## 2024-07-02 NOTE — TELEPHONE ENCOUNTER
----- Message from Gilmer Vera sent at 7/2/2024 10:04 AM CDT -----  Regarding: return call  Contact: patient  Type:  Patient Returning Call    Who Called:patient  Who Left Message for Patient:office nurse  Does the patient know what this is regarding?:please call  Would the patient rather a call back or a response via [facility]?   Best Call Back Number:[phone]  Additional Information: no details given

## 2024-07-05 ENCOUNTER — TELEPHONE (OUTPATIENT)
Dept: CARDIOLOGY | Facility: CLINIC | Age: 44
End: 2024-07-05
Payer: MEDICAID

## 2024-07-05 NOTE — TELEPHONE ENCOUNTER
----- Message from Stacy Varghese sent at 7/5/2024 12:03 PM CDT -----  Good Morning,     In regards to this patient's [facility] plan CPT 57387 has been denied for the following reason:    Denial Rationale  · What was given to us in your doctor's notes or phone call does not meet [employer]'s Guidelines for a(n) Transthoracic Echocardiogram.  · Your doctor said you have a heart valve problem.  · According to [employer] Clinical Guideline 067 for Transthoracic Echocardiogram we need doctor's notes that say the reason this test is being done again so soon (less than 1 year) before we can review for an approval.  · From the doctor's notes and/or phone call, we have not received the information needed.       A peer to peer discussion can be done by calling  [phone] tracking #[id number].    Thank You,    Stacy ALCARAZ   Pre-Service  Radiology Dept

## 2024-07-10 ENCOUNTER — LAB VISIT (OUTPATIENT)
Dept: LAB | Facility: HOSPITAL | Age: 44
End: 2024-07-10
Attending: INTERNAL MEDICINE
Payer: MEDICAID

## 2024-07-10 ENCOUNTER — OFFICE VISIT (OUTPATIENT)
Dept: CARDIOLOGY | Facility: CLINIC | Age: 44
End: 2024-07-10
Payer: MEDICAID

## 2024-07-10 VITALS
DIASTOLIC BLOOD PRESSURE: 70 MMHG | HEART RATE: 83 BPM | HEIGHT: 74 IN | SYSTOLIC BLOOD PRESSURE: 120 MMHG | WEIGHT: 202 LBS | OXYGEN SATURATION: 98 % | BODY MASS INDEX: 25.93 KG/M2

## 2024-07-10 DIAGNOSIS — E89.0 POSTABLATIVE HYPOTHYROIDISM: ICD-10-CM

## 2024-07-10 DIAGNOSIS — R59.9 LYMPH NODE ENLARGEMENT: ICD-10-CM

## 2024-07-10 DIAGNOSIS — R55 SYNCOPE AND COLLAPSE: ICD-10-CM

## 2024-07-10 DIAGNOSIS — E78.00 HYPERCHOLESTEROLEMIA: ICD-10-CM

## 2024-07-10 DIAGNOSIS — I35.0 AORTIC VALVE STENOSIS, ETIOLOGY OF CARDIAC VALVE DISEASE UNSPECIFIED: ICD-10-CM

## 2024-07-10 DIAGNOSIS — I35.0 NONRHEUMATIC AORTIC VALVE STENOSIS: ICD-10-CM

## 2024-07-10 DIAGNOSIS — Z88.8 ALLERGY TO IODINE: ICD-10-CM

## 2024-07-10 DIAGNOSIS — Z13.6 ENCOUNTER FOR SCREENING FOR CARDIOVASCULAR DISORDERS: ICD-10-CM

## 2024-07-10 DIAGNOSIS — R55 SYNCOPE AND COLLAPSE: Primary | ICD-10-CM

## 2024-07-10 LAB
ALBUMIN SERPL BCP-MCNC: 4.6 G/DL (ref 3.5–5.2)
ALP SERPL-CCNC: 52 U/L (ref 55–135)
ALT SERPL W/O P-5'-P-CCNC: 16 U/L (ref 10–44)
ANION GAP SERPL CALC-SCNC: 5 MMOL/L (ref 8–16)
AST SERPL-CCNC: 15 U/L (ref 10–40)
BILIRUB SERPL-MCNC: 0.5 MG/DL (ref 0.1–1)
BUN SERPL-MCNC: 13 MG/DL (ref 6–20)
CALCIUM SERPL-MCNC: 9 MG/DL (ref 8.7–10.5)
CHLORIDE SERPL-SCNC: 103 MMOL/L (ref 95–110)
CO2 SERPL-SCNC: 30 MMOL/L (ref 23–29)
CREAT SERPL-MCNC: 0.9 MG/DL (ref 0.5–1.4)
EST. GFR  (NO RACE VARIABLE): >60 ML/MIN/1.73 M^2
GLUCOSE SERPL-MCNC: 105 MG/DL (ref 70–110)
MAGNESIUM SERPL-MCNC: 2 MG/DL (ref 1.6–2.6)
POTASSIUM SERPL-SCNC: 4.6 MMOL/L (ref 3.5–5.1)
PROT SERPL-MCNC: 7.1 G/DL (ref 6–8.4)
SODIUM SERPL-SCNC: 138 MMOL/L (ref 136–145)
TSH SERPL DL<=0.005 MIU/L-ACNC: 0.96 UIU/ML (ref 0.34–5.6)

## 2024-07-10 PROCEDURE — 3074F SYST BP LT 130 MM HG: CPT | Mod: CPTII,,, | Performed by: INTERNAL MEDICINE

## 2024-07-10 PROCEDURE — 1159F MED LIST DOCD IN RCRD: CPT | Mod: CPTII,,, | Performed by: INTERNAL MEDICINE

## 2024-07-10 PROCEDURE — 99214 OFFICE O/P EST MOD 30 MIN: CPT | Mod: S$PBB,,, | Performed by: INTERNAL MEDICINE

## 2024-07-10 PROCEDURE — 1160F RVW MEDS BY RX/DR IN RCRD: CPT | Mod: CPTII,,, | Performed by: INTERNAL MEDICINE

## 2024-07-10 PROCEDURE — 84443 ASSAY THYROID STIM HORMONE: CPT | Performed by: INTERNAL MEDICINE

## 2024-07-10 PROCEDURE — 3078F DIAST BP <80 MM HG: CPT | Mod: CPTII,,, | Performed by: INTERNAL MEDICINE

## 2024-07-10 PROCEDURE — 99213 OFFICE O/P EST LOW 20 MIN: CPT | Mod: PBBFAC,PN | Performed by: INTERNAL MEDICINE

## 2024-07-10 PROCEDURE — 83735 ASSAY OF MAGNESIUM: CPT | Performed by: INTERNAL MEDICINE

## 2024-07-10 PROCEDURE — 3008F BODY MASS INDEX DOCD: CPT | Mod: CPTII,,, | Performed by: INTERNAL MEDICINE

## 2024-07-10 PROCEDURE — 80053 COMPREHEN METABOLIC PANEL: CPT | Performed by: INTERNAL MEDICINE

## 2024-07-10 PROCEDURE — 99999 PR PBB SHADOW E&M-EST. PATIENT-LVL III: CPT | Mod: PBBFAC,,, | Performed by: INTERNAL MEDICINE

## 2024-07-10 PROCEDURE — 36415 COLL VENOUS BLD VENIPUNCTURE: CPT | Performed by: INTERNAL MEDICINE

## 2024-07-10 NOTE — ASSESSMENT & PLAN NOTE
He had a syncopal episode with collapse.  Will obtain an echocardiogram to evaluate his LV function and his valvular disease.  Will obtain a 3 day monitor for any potential a arrhythmias.

## 2024-07-10 NOTE — ASSESSMENT & PLAN NOTE
He is allergic to iodine.  Will defer a coronary CTA for now until the results of the PET scans are available

## 2024-07-10 NOTE — ASSESSMENT & PLAN NOTE
He had mild progression of his aortic insufficiency during the last echocardiogram.  He has had a syncopal episode therefore a repeat echocardiogram is to be done to evaluate the aortic valve and his degree of aortic insufficiency.

## 2024-07-10 NOTE — PROGRESS NOTES
Patient ID:  Michael Hart is a 44 y.o. male who presents for follow-up of Aortic Stenosis, syncope (fainted), and Loss of Consciousness      Nonrheumatic aortic valve stenosis  Concern if the Adderall is causing any deterioration of the valve.  Will perform an echocardiogram in 6 months.  This has been discussed with the patient.  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    He was walking his heart started pounding.  He got lightheaded and dizzy he lay down to the ground and he became unconscious for few seconds.  Since then he has noticed that the heart has been pounding.  He has a fluttering strange sensation in the retrosternal area.  At the age of 17 he had a mediastinal mass a couple of biopsies were performed nondiagnostic subsequently he was taking to surgery by Dr. [facility] he removed the mass as well as the pericardium.  He had a course of chemotherapy subsequently he had radiation therapy done at [facility] in [place] and had a 2nd dose of chemotherapy.  He has noticed some lymph nodes in the right inguinal hernia he is scheduled to have a PET scan on Friday.  Will try to get information if he has calcification of the coronary arteries.          Past Medical History:   Diagnosis Date    Enlarged lymph node 10/2021    Hodgkin lymphoma 1998    Hypothyroidism         Past Surgical History:   Procedure Laterality Date    BIOPSY OF INGUINAL LYMPH NODE Right 10/22/2021    Procedure: BIOPSY, LYMPH NODE, INGUINAL;  Surgeon: Trey Lora III, MD;  Location: [facility];  Service: General;  Laterality: Right;  right inguinal lymph node biopsy    HERNIA REPAIR      as a child    RHINOPLASTY      TONSILLECTOMY   "    turmor removed from chest      also pericardium           Current Outpatient Medications   Medication Instructions    atorvastatin (LIPITOR) 10 mg, Oral, Daily    dextroamphetamine-amphetamine (ADDERALL) 10 mg Tab 10 mg, Oral, Daily    ezetimibe (ZETIA) 10 mg, Oral, Daily    hydrocortisone (ANUSOL-HC) 25 mg, Rectal, 2 times daily    hydrocortisone-pramoxine (PROCTOFOAM-HS) rectal foam 1 applicator, Rectal, 2 times daily    levothyroxine (SYNTHROID) 100 MCG tablet TAKE 1 TABLET BY MOUTH EVERY DAY        Review of patient's allergies indicates:   Allergen Reactions    Ciprofloxacin Hives and Itching    Iodine and iodide containing products Itching     And IV contrast          Review of Systems   Cardiovascular:  Positive for irregular heartbeat, palpitations and syncope. Negative for dyspnea on exertion.   Respiratory:  Negative for cough and shortness of breath.         Objective:     Vitals:    07/10/24 0936   BP: 120/70   BP Location: Left arm   Patient Position: Sitting   BP Method: Medium (Manual)   Pulse: 83   SpO2: 98%   Weight: 91.6 kg (202 lb)   Height: 6' 2" (1.88 m)       Physical Exam  Vitals and nursing note reviewed.   Constitutional:       Appearance: He is well-developed.   HENT:      Head: Normocephalic and atraumatic.   Eyes:      Conjunctiva/sclera: Conjunctivae normal.   Cardiovascular:      Rate and Rhythm: Normal rate and regular rhythm.      Heart sounds: Murmur (Grade 2/6 systolic murmur at the pace.  Grade 1 over 6 diastolic murmur at the left sternal border) heard.   Pulmonary:      Effort: Pulmonary effort is normal.      Breath sounds: Normal breath sounds.   Abdominal:      General: Bowel sounds are normal.      Palpations: Abdomen is soft.   Musculoskeletal:         General: Normal range of motion.   Skin:     General: Skin is warm and dry.   Neurological:      Mental Status: He is alert and oriented to person, place, and time.   Psychiatric:         Behavior: Behavior normal.         " Thought Content: Thought content normal.         Judgment: Judgment normal.       CMP  Sodium   Date Value Ref Range Status   07/10/2024 138 136 - 145 mmol/L Final     Potassium   Date Value Ref Range Status   07/10/2024 4.6 3.5 - 5.1 mmol/L Final     Chloride   Date Value Ref Range Status   07/10/2024 103 95 - 110 mmol/L Final     CO2   Date Value Ref Range Status   07/10/2024 30 (H) 23 - 29 mmol/L Final     Glucose   Date Value Ref Range Status   07/10/2024 105 70 - 110 mg/dL Final     BUN   Date Value Ref Range Status   07/10/2024 13 6 - 20 mg/dL Final     Creatinine   Date Value Ref Range Status   07/10/2024 0.9 0.5 - 1.4 mg/dL Final     Calcium   Date Value Ref Range Status   07/10/2024 9.0 8.7 - 10.5 mg/dL Final     Total Protein   Date Value Ref Range Status   07/10/2024 7.1 6.0 - 8.4 g/dL Final     Albumin   Date Value Ref Range Status   07/10/2024 4.6 3.5 - 5.2 g/dL Final     Total Bilirubin   Date Value Ref Range Status   07/10/2024 0.5 0.1 - 1.0 mg/dL Final     Comment:     For infants and newborns, interpretation of results should be based  on gestational age, weight and in agreement with clinical  observations.    Premature Infant recommended reference ranges:  Up to 24 hours.............<8.0 mg/dL  Up to 48 hours............<12.0 mg/dL  3-5 days..................<15.0 mg/dL  6-29 days.................<15.0 mg/dL       Alkaline Phosphatase   Date Value Ref Range Status   07/10/2024 52 (L) 55 - 135 U/L Final     AST   Date Value Ref Range Status   07/10/2024 15 10 - 40 U/L Final     ALT   Date Value Ref Range Status   07/10/2024 16 10 - 44 U/L Final     Anion Gap   Date Value Ref Range Status   07/10/2024 5 (L) 8 - 16 mmol/L Final     eGFR if    Date Value Ref Range Status   05/06/2022 117 > OR = 60 mL/min/1.73m2 Final     eGFR if non    Date Value Ref Range Status   05/06/2022 101 > OR = 60 mL/min/1.73m2 Final      BMP  Lab Results   Component Value Date      07/10/2024    K 4.6 07/10/2024     07/10/2024    CO2 30 (H) 07/10/2024    BUN 13 07/10/2024    CREATININE 0.9 07/10/2024    CALCIUM 9.0 07/10/2024    ANIONGAP 5 (L) 07/10/2024    ESTGFRAFRICA 117 05/06/2022    EGFRNONAA 101 05/06/2022      BNP  @LABRCNTIP(BNP,BNPTRIAGEBLO)@   Lab Results   Component Value Date    CHOL 120 03/18/2024    CHOL 165 07/18/2023    CHOL 198 05/06/2022     Lab Results   Component Value Date    HDL 43 03/18/2024    HDL 52 07/18/2023    HDL 50 05/06/2022     Lab Results   Component Value Date    LDLCALC 56 03/18/2024    LDLCALC 97.6 07/18/2023    LDLCALC 126 (H) 05/06/2022     Lab Results   Component Value Date    TRIG 124 03/18/2024    TRIG 77 07/18/2023    TRIG 114 05/06/2022     Lab Results   Component Value Date    CHOLHDL 2.8 03/18/2024    CHOLHDL 31.5 07/18/2023    CHOLHDL 4.0 05/06/2022      Lab Results   Component Value Date    TSH 0.965 07/10/2024    FREET4 1.13 07/18/2023     Lab Results   Component Value Date    HGBA1C 5.0 10/26/2021     Lab Results   Component Value Date    WBC 5.3 03/18/2024    HGB 15.9 03/18/2024    HCT 46.8 03/18/2024    MCV 91.4 03/18/2024     03/18/2024         Results for orders placed during the hospital encounter of 01/02/24    Echo    Interpretation Summary    Left Ventricle: The left ventricle is normal in size. Normal wall thickness. Normal wall motion. There is low normal systolic function. Ejection fraction by visual approximation is 50%. Grade I diastolic dysfunction.    Right Ventricle: Normal right ventricular cavity size. Wall thickness is normal. Right ventricle wall motion  is normal. Systolic function is normal.    Aortic Valve: The aortic valve is a trileaflet valve. There is moderate aortic valve sclerosis. Moderately restricted motion. There is mild to moderate stenosis. Aortic valve area by VTI is 1.49 cm². Aortic valve peak velocity is 2.38 m/s. Mean gradient is 14 mmHg. The dimensionless index is 0.46. There is mild to  moderate aortic regurgitation.    IVC/SVC: Normal venous pressure at 3 mmHg.    EPSS 1.7 cm.     No results found for this or any previous visit.     EKG  Results for orders placed or performed in visit on 01/11/24   IN OFFICE EKG 12-LEAD (to [place])    Collection Time: 01/11/24 10:41 AM    Narrative    Test Reason : I35.0,    Vent. Rate : 097 BPM     Atrial Rate : 097 BPM     P-R Int : 164 ms          QRS Dur : 092 ms      QT Int : 342 ms       P-R-T Axes : 064 061 071 degrees     QTc Int : 434 ms    Normal sinus rhythm  Normal ECG  When compared with ECG of 21-OCT-2021 12:25,  Vent. rate has increased BY  37 BPM  Confirmed by Dragan Khan MD ([id number]) on 1/15/2024 4:53:08 PM    Referred By:  NINO           Confirmed By:Dragan Khan MD      Stress  No results found for this or any previous visit.             Assessment:       Nonrheumatic aortic valve stenosis  He had mild progression of his aortic insufficiency during the last echocardiogram.  He has had a syncopal episode therefore a repeat echocardiogram is to be done to evaluate the aortic valve and his degree of aortic insufficiency.    Hypercholesterolemia  Treated with 10 mg of atorvastatin    Syncope and collapse  He had a syncopal episode with collapse.  Will obtain an echocardiogram to evaluate his LV function and his valvular disease.  Will obtain a 3 day monitor for any potential a arrhythmias.    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  Will defer a coronary CTA for now until the results of the PET scans are available    Lymph node enlargement  Lymph node enlargement workup in process with Dr. Milan       Plan:       Obtain an echocardiogram to assess his left ventricular function as well as valvular disease.  Obtain a 3 day monitor to determine any a arrhythmias.  He is to have a PET scan for evaluation of lymph nodes in the right groin area.  Will check and see if he has  calcification of the coronary arteries.  Once the echocardiogram and the monitor are done will consider doing a stress test.

## 2024-07-12 ENCOUNTER — HOSPITAL ENCOUNTER (OUTPATIENT)
Dept: RADIOLOGY | Facility: HOSPITAL | Age: 44
Discharge: HOME | End: 2024-07-12
Attending: INTERNAL MEDICINE
Payer: MEDICAID

## 2024-07-12 DIAGNOSIS — Z85.71 HX OF HODGKIN'S DISEASE: ICD-10-CM

## 2024-07-12 DIAGNOSIS — R59.0 LYMPHADENOPATHY, INGUINAL: ICD-10-CM

## 2024-07-12 LAB — GLUCOSE SERPL-MCNC: 101 MG/DL (ref 70–110)

## 2024-07-12 PROCEDURE — A9552 F18 FDG: HCPCS | Mod: PN | Performed by: INTERNAL MEDICINE

## 2024-07-12 PROCEDURE — 78815 PET IMAGE W/CT SKULL-THIGH: CPT | Mod: TC,PS,PN

## 2024-07-12 PROCEDURE — 78815 PET IMAGE W/CT SKULL-THIGH: CPT | Mod: 26,PS,, | Performed by: RADIOLOGY

## 2024-07-12 RX ORDER — FLUDEOXYGLUCOSE F18 500 MCI/ML
12 INJECTION INTRAVENOUS
Status: COMPLETED | OUTPATIENT
Start: 2024-07-12 | End: 2024-07-12

## 2024-07-12 RX ADMIN — FLUDEOXYGLUCOSE F-18 11.9 MILLICURIE: 500 INJECTION INTRAVENOUS at 01:07

## 2024-07-12 NOTE — PROGRESS NOTES
PET Imaging Questionnaire    Are you a Diabetic? Recent Blood Sugar level? No    Are you anemic? Bone Marrow Stimulation Meds? No    Have you had a CT Scan, if so when & where was your last one? Yes -     Have you had a PET Scan, if so when & where was your last one? Yes -     Chemotherapy or currently on Chemotherapy? Yes    Radiation therapy? Yes    Surgical History:   Past Surgical History:   Procedure Laterality Date    BIOPSY OF INGUINAL LYMPH NODE Right 10/22/2021    Procedure: BIOPSY, LYMPH NODE, INGUINAL;  Surgeon: Trey Lora III, MD;  Location: [facility];  Service: General;  Laterality: Right;  right inguinal lymph node biopsy    HERNIA REPAIR      as a child    RHINOPLASTY      TONSILLECTOMY      turmor removed from chest      also pericardium         Have you been fasting for at least 6 hours? Yes    Is there any chance you may be pregnant or breastfeeding? No    Assay: 12.31 MCi@:1256   Injection Site:rt ac     Residual: .38 mCi@: 1258   Technologist: Patricia Rob Injected:11.9mCi

## 2024-07-15 ENCOUNTER — HOSPITAL ENCOUNTER (OUTPATIENT)
Dept: CARDIOLOGY | Facility: HOSPITAL | Age: 44
Discharge: HOME | End: 2024-07-15
Attending: INTERNAL MEDICINE
Payer: MEDICAID

## 2024-07-15 VITALS — WEIGHT: 201.94 LBS | HEIGHT: 74 IN | BODY MASS INDEX: 25.92 KG/M2

## 2024-07-15 DIAGNOSIS — R55 SYNCOPE AND COLLAPSE: ICD-10-CM

## 2024-07-15 LAB
AORTIC ROOT ANNULUS: 3.7 CM
AORTIC VALVE CUSP SEPERATION: 0.6 CM
ASCENDING AORTA: 3.6 CM
AV INDEX (PROSTH): 0.47
AV MEAN GRADIENT: 13 MMHG
AV PEAK GRADIENT: 20 MMHG
AV REGURGITATION PRESSURE HALF TIME: 339 MS
AV VALVE AREA BY VELOCITY RATIO: 1.51 CM²
AV VALVE AREA: 1.48 CM²
AV VELOCITY RATIO: 0.48
BSA FOR ECHO PROCEDURE: 2.19 M2
CV ECHO LV RWT: 0.45 CM
DOP CALC AO PEAK VEL: 2.22 M/S
DOP CALC AO VTI: 45.5 CM
DOP CALC LVOT AREA: 3.1 CM2
DOP CALC LVOT DIAMETER: 2 CM
DOP CALC LVOT PEAK VEL: 1.07 M/S
DOP CALC LVOT STROKE VOLUME: 67.2 CM3
DOP CALC MV VTI: 25.8 CM
DOP CALCLVOT PEAK VEL VTI: 21.4 CM
E WAVE DECELERATION TIME: 164 MSEC
E/A RATIO: 0.73
E/E' RATIO: 10.53 M/S
ECHO LV POSTERIOR WALL: 1 CM (ref 0.6–1.1)
EJECTION FRACTION: 55 %
FRACTIONAL SHORTENING: 30 % (ref 28–44)
INTERVENTRICULAR SEPTUM: 1.1 CM (ref 0.6–1.1)
IVC DIAMETER: 1.6 CM
IVRT: 63 MSEC
LEFT ATRIUM AREA SYSTOLIC (APICAL 2 CHAMBER): 21 CM2
LEFT ATRIUM AREA SYSTOLIC (APICAL 4 CHAMBER): 17 CM2
LEFT ATRIUM SIZE: 4 CM
LEFT ATRIUM VOLUME INDEX MOD: 23.8 ML/M2
LEFT ATRIUM VOLUME MOD: 51.9 CM3
LEFT INTERNAL DIMENSION IN SYSTOLE: 3.1 CM (ref 2.1–4)
LEFT VENTRICLE DIASTOLIC VOLUME INDEX: 40.23 ML/M2
LEFT VENTRICLE DIASTOLIC VOLUME: 87.7 ML
LEFT VENTRICLE END SYSTOLIC VOLUME APICAL 2 CHAMBER: 63.1 ML
LEFT VENTRICLE END SYSTOLIC VOLUME APICAL 4 CHAMBER: 39.2 ML
LEFT VENTRICLE MASS INDEX: 73 G/M2
LEFT VENTRICLE SYSTOLIC VOLUME INDEX: 17.4 ML/M2
LEFT VENTRICLE SYSTOLIC VOLUME: 37.9 ML
LEFT VENTRICULAR INTERNAL DIMENSION IN DIASTOLE: 4.4 CM (ref 3.5–6)
LEFT VENTRICULAR MASS: 158.21 G
LV LATERAL E/E' RATIO: 9.09 M/S
LV SEPTAL E/E' RATIO: 12.5 M/S
LVED V (TEICH): 87.7 ML
LVES V (TEICH): 37.9 ML
LVOT MG: 3 MMHG
LVOT MV: 0.76 CM/S
MV MEAN GRADIENT: 4 MMHG
MV PEAK A VEL: 1.37 M/S
MV PEAK E VEL: 1 M/S
MV PEAK GRADIENT: 9 MMHG
MV VALVE AREA BY CONTINUITY EQUATION: 2.6 CM2
OHS CV RV/LV RATIO: 0.45 CM
PISA AR MAX VEL: 3.11 M/S
PV MV: 0.73 M/S
PV PEAK GRADIENT: 4 MMHG
PV PEAK VELOCITY: 1.03 M/S
RA PRESSURE ESTIMATED: 3 MMHG
RIGHT VENTRICULAR END-DIASTOLIC DIMENSION: 2 CM
RV TISSUE DOPPLER FREE WALL SYSTOLIC VELOCITY 1 (APICAL 4 CHAMBER VIEW): 13.8 CM/S
TDI LATERAL: 0.11 M/S
TDI SEPTAL: 0.08 M/S
TDI: 0.1 M/S
TRICUSPID ANNULAR PLANE SYSTOLIC EXCURSION: 2.15 CM
Z-SCORE OF LEFT VENTRICULAR DIMENSION IN END DIASTOLE: -5.05
Z-SCORE OF LEFT VENTRICULAR DIMENSION IN END SYSTOLE: -2.83

## 2024-07-15 PROCEDURE — 93306 TTE W/DOPPLER COMPLETE: CPT | Mod: 26,,, | Performed by: INTERNAL MEDICINE

## 2024-07-15 PROCEDURE — 93306 TTE W/DOPPLER COMPLETE: CPT

## 2024-07-21 ENCOUNTER — TELEPHONE (OUTPATIENT)
Dept: HEMATOLOGY/ONCOLOGY | Facility: HOSPITAL | Age: 44
End: 2024-07-21

## 2024-07-22 ENCOUNTER — OFFICE VISIT (OUTPATIENT)
Facility: CLINIC | Age: 44
End: 2024-07-22
Payer: MEDICAID

## 2024-07-22 VITALS
RESPIRATION RATE: 16 BRPM | HEART RATE: 93 BPM | SYSTOLIC BLOOD PRESSURE: 133 MMHG | HEIGHT: 74 IN | WEIGHT: 203.38 LBS | TEMPERATURE: 98 F | DIASTOLIC BLOOD PRESSURE: 87 MMHG | BODY MASS INDEX: 26.1 KG/M2

## 2024-07-22 DIAGNOSIS — Z85.71 HX OF HODGKIN'S DISEASE: Primary | ICD-10-CM

## 2024-07-22 DIAGNOSIS — R55 SYNCOPE AND COLLAPSE: ICD-10-CM

## 2024-07-22 DIAGNOSIS — I35.0 NONRHEUMATIC AORTIC VALVE STENOSIS: ICD-10-CM

## 2024-07-22 DIAGNOSIS — R59.9 LYMPH NODE ENLARGEMENT: ICD-10-CM

## 2024-07-22 PROCEDURE — 99999 PR PBB SHADOW E&M-EST. PATIENT-LVL III: CPT | Mod: PBBFAC,,, | Performed by: INTERNAL MEDICINE

## 2024-07-22 PROCEDURE — 99215 OFFICE O/P EST HI 40 MIN: CPT | Mod: S$PBB,,, | Performed by: INTERNAL MEDICINE

## 2024-07-22 PROCEDURE — 3008F BODY MASS INDEX DOCD: CPT | Mod: CPTII,,, | Performed by: INTERNAL MEDICINE

## 2024-07-22 PROCEDURE — 3075F SYST BP GE 130 - 139MM HG: CPT | Mod: CPTII,,, | Performed by: INTERNAL MEDICINE

## 2024-07-22 PROCEDURE — G2211 COMPLEX E/M VISIT ADD ON: HCPCS | Mod: S$PBB,,, | Performed by: INTERNAL MEDICINE

## 2024-07-22 PROCEDURE — 99213 OFFICE O/P EST LOW 20 MIN: CPT | Mod: PBBFAC,PN | Performed by: INTERNAL MEDICINE

## 2024-07-22 PROCEDURE — 1159F MED LIST DOCD IN RCRD: CPT | Mod: CPTII,,, | Performed by: INTERNAL MEDICINE

## 2024-07-22 PROCEDURE — 3079F DIAST BP 80-89 MM HG: CPT | Mod: CPTII,,, | Performed by: INTERNAL MEDICINE

## 2024-07-22 NOTE — TELEPHONE ENCOUNTER
Call pet scan in [place], ask them if any enlarged LN were seen on the CT part of the study, especially at inguinal and retroperitoneal areas?

## 2024-07-22 NOTE — TELEPHONE ENCOUNTER
Voicemail left at [facility] to clarify PET scan, awaiting call back at at this time.

## 2024-07-22 NOTE — PROGRESS NOTES
[facility] [address] Hematology Oncology In Office subsequent Encounter Note    7/22/24    Subjective:      Patient ID:   Michael Morley  44 y.o. male  1980   MD Michael Muhammad MD      Chief Complaint:    Enlarged Lymph node     HPI:  44 y.o. male was referred to myself per Dr. Michael MORLEY for the evaluation of an enlarged right inguinal lymph node that had been present for approximately 3 weeks.  Patient denies fever, night sweats, weight loss.  He does not have B symptoms.  At age 17 he was diagnosed with Hodgkin's disease.  He had a large mediastinal mass.  Initial biopsy per Dr. Barry was negative for malignancy.  Thereafter he was referred to [facility] and had thoracotomy there and was found to have Hodgkin's disease.  He was treated with combination chemotherapy, I believe his regimen was ABVD x6 cycles.  His treatment was complicated by the development of chest pain symptoms and I believe pericarditis with the administration of Adriamycin.  His treatment was given to Oncology at [facility].  Thereafter he went to [facility] in [place] for his adjuvant radiation therapy to the chest.  He achieved complete remission status and was followed without lymphoma recurrence.    I had seen him at the time of his original diagnosis but did not participate in his care after he went to [facility] and [facility].  He had an office chart here however it has been destroyed after several years of inactivity on the case file.  The history I am giving above is based on my recollection and based on his information given to me today.    He lives in [place].  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    He does not smoke, he does not drink alcohol,  per the chart he is allergic to Cipro.  He tells me that he does have mild allergy to iodine manifested with itching.    He has hypothyroidism and is on Synthroid.  This  was acquired after he received previous chest irradiation.    His father has psoriasis.  His mother has valvular heart disease.  He has a sister and brother alive and well.  He does not have children.    LN Bx at R inguinal area was reactive LN, no cancer seen.  Hodgkin's Dx was around age 19-20, now age 43, 25 years since HD dx.  C/O nodule or mass at L inguinal area again.  Denies Fever, NS, decreased appetite or decreased weight.  No B sx.    U/S of inguinal area was negative.    PET scan was done at this time and was negative for residual, recurrent or malignant change.  It was a negative study.    He recently experienced dizziness, syncope, while traveling abroad.  On evaluating this further [place] in the [place] he has moderate aortic stenosis, possibly related to previous treatment with radiation for his lymphoma condition at age 17.  Dr. Khan is monitoring him for this condition.    From my standpoint observation is the plan, he will follow-up here in 6 months, can cancel if doing okay and return to clinic here in 1 year for sure.      ROS:   GEN: normal without any fever, night sweats or weight loss  HEENT: normal with no HA's, sore throat, stiff neck, changes in vision  CV: See HPI  PULM: normal with no SOB, cough, hemoptysis, sputum or pleuritic pain  GI: normal with no abdominal pain, nausea, vomiting, constipation, diarrhea, melanotic stools, BRBPR, or hematemesis  : normal with no hematuria, dysuria  BREAST: normal with no mass, discharge, pain  SKIN: normal with no rash, erythema, bruising, or swelling     Past Medical History:   Diagnosis Date    Enlarged lymph node 10/2021    Hodgkin lymphoma 1998    Hypothyroidism      Past Surgical History:   Procedure Laterality Date    BIOPSY OF INGUINAL LYMPH NODE Right 10/22/2021    Procedure: BIOPSY, LYMPH NODE, INGUINAL;  Surgeon: Trey Lora III, MD;  Location: [facility];  Service: General;  Laterality: Right;  right inguinal lymph node biopsy     HERNIA REPAIR      as a child    RHINOPLASTY      TONSILLECTOMY      turmor removed from chest      also pericardium        Review of patient's allergies indicates:   Allergen Reactions    Ciprofloxacin Hives and Itching    Iodine and iodide containing products Itching     And IV contrast       Social History     Socioeconomic History    Marital status: Single   Tobacco Use    Smoking status: Never    Smokeless tobacco: Never   Substance and Sexual Activity    Alcohol use: Yes     Alcohol/week: 1.0 standard drink of alcohol     Types: 1 Glasses of wine per week     Comment: nightly    Drug use: Never     Social Determinants of Health     Financial Resource Strain: Patient Declined (12/5/2023)    Overall Financial Resource Strain (CARDIA)     Difficulty of Paying Living Expenses: Patient declined   Food Insecurity: Patient Declined (12/5/2023)    Hunger Vital Sign     Worried About Running Out of Food in the Last Year: Patient declined     Ran Out of Food in the Last Year: Patient declined   Transportation Needs: Patient Declined (12/5/2023)    PRAPARE - Transportation     Lack of Transportation (Medical): Patient declined     Lack of Transportation (Non-Medical): Patient declined   Physical Activity: Sufficiently Active (12/5/2023)    Exercise Vital Sign     Days of Exercise per Week: 5 days     Minutes of Exercise per Session: 40 min   Stress: No Stress Concern Present (12/5/2023)    [employer] - Occupational Stress Questionnaire     Feeling of Stress : Not at all   Housing Stability: Unknown (12/5/2023)    Housing Stability Vital Sign     Unable to Pay for Housing in the Last Year: Patient refused     Unstable Housing in the Last Year: Patient refused         Current Outpatient Medications:     atorvastatin (LIPITOR) 10 MG tablet, Take 1 tablet (10 mg total) by mouth once daily., Disp: 90 tablet, Rfl: 3    dextroamphetamine-amphetamine (ADDERALL) 10 mg Tab, Take 1 tablet (10 mg total)  by mouth once daily., Disp: 30 tablet, Rfl: 0    ezetimibe (ZETIA) 10 mg tablet, Take 1 tablet (10 mg total) by mouth once daily., Disp: 90 tablet, Rfl: 3    hydrocortisone (ANUSOL-HC) 25 mg suppository, Place 25 mg rectally 2 (two) times daily., Disp: , Rfl:     hydrocortisone-pramoxine (PROCTOFOAM-HS) rectal foam, Place 1 applicator rectally 2 (two) times daily., Disp: 20 g, Rfl: 3    levothyroxine (SYNTHROID) 100 MCG tablet, TAKE 1 TABLET BY MOUTH EVERY DAY, Disp: 30 tablet, Rfl: 20          Objective:   Vitals:  There were no vitals taken for this visit.    Physical Examination:   GEN: no apparent distress, comfortable  HEAD: atraumatic and normocephalic  EYES: no pallor, no icterus  ENT: no pharyngeal erythema, external ears WNL; no nasal discharge; no thrush  NECK: no masses, thyroid normal, trachea midline, no LAD/LN's, supple  CV: RRR with no murmur; normal pulse  CHEST: Normal respiratory effort; CTAB; normal breath sounds; no wheeze or crackles  ABDOM: nontender and nondistended; soft;  no rebound/guarding, L/S NP  MUSC/Skeletal: ROM normal; no crepitus; joints normal; no deformities   EXTREM: no clubbing, cyanosis, inflammation or swelling.  He has varicosities noted at the R leg.  SKIN: no rashes, lesions, ulcers, petechiae   : Not examined on this visit.  He kept genitals covered and in place with gown  NEURO: grossly intact; motor/sensory WNL;  no tremors  PSYCH: normal mood, affect and behavior  LYMPH: normal cervical, supraclavicular, axillary and L groin LN's.  At the R inguinal area, he has a firm movable 2 x 1 cm LN on exam, evident when he is standing.  Unchanged from the last exam 6 months ago.  Breasts: L & R no palpable mass    Re eval of LN status, PET scan 7/2024, he is out of town during the month of 6/2024.  If PET shows increased lymphadenopathy or if R inguinal LN is hypermetabolic, then proceed with LN Bx.  RTC 6 weeks.      Labs:   Lab Results   Component Value Date    WBC 5.3  03/18/2024    HGB 15.9 03/18/2024    HCT 46.8 03/18/2024    MCV 91.4 03/18/2024     03/18/2024    CMP  Sodium   Date Value Ref Range Status   07/10/2024 138 136 - 145 mmol/L Final     Potassium   Date Value Ref Range Status   07/10/2024 4.6 3.5 - 5.1 mmol/L Final     Chloride   Date Value Ref Range Status   07/10/2024 103 95 - 110 mmol/L Final     CO2   Date Value Ref Range Status   07/10/2024 30 (H) 23 - 29 mmol/L Final     Glucose   Date Value Ref Range Status   07/10/2024 105 70 - 110 mg/dL Final     BUN   Date Value Ref Range Status   07/10/2024 13 6 - 20 mg/dL Final     Creatinine   Date Value Ref Range Status   07/10/2024 0.9 0.5 - 1.4 mg/dL Final     Calcium   Date Value Ref Range Status   07/10/2024 9.0 8.7 - 10.5 mg/dL Final     Total Protein   Date Value Ref Range Status   07/10/2024 7.1 6.0 - 8.4 g/dL Final     Albumin   Date Value Ref Range Status   07/10/2024 4.6 3.5 - 5.2 g/dL Final     Total Bilirubin   Date Value Ref Range Status   07/10/2024 0.5 0.1 - 1.0 mg/dL Final     Comment:     For infants and newborns, interpretation of results should be based  on gestational age, weight and in agreement with clinical  observations.    Premature Infant recommended reference ranges:  Up to 24 hours.............<8.0 mg/dL  Up to 48 hours............<12.0 mg/dL  3-5 days..................<15.0 mg/dL  6-29 days.................<15.0 mg/dL       Alkaline Phosphatase   Date Value Ref Range Status   07/10/2024 52 (L) 55 - 135 U/L Final     AST   Date Value Ref Range Status   07/10/2024 15 10 - 40 U/L Final     ALT   Date Value Ref Range Status   07/10/2024 16 10 - 44 U/L Final     Anion Gap   Date Value Ref Range Status   07/10/2024 5 (L) 8 - 16 mmol/L Final     eGFR if    Date Value Ref Range Status   05/06/2022 117 > OR = 60 mL/min/1.73m2 Final     eGFR if non    Date Value Ref Range Status   05/06/2022 101 > OR = 60 mL/min/1.73m2 Final     Ordered CBC, CMP, TSH, T4, AFP,  B-HCG    Assessment:   (1) 44 y.o. male with Hodgkins Dx at age 17, treated with Rad Rx adjuvantly after  combination chemotherapy, achieving CR.    (2)Hx 2 cm firm movable R inguinal LN.  He has hx of R inguinal hernia repair at age 1.  He has been building a boat at home, moving 3 hulls.    (3)Adriamycin hx.  Ej Fx 53% 10/25/22.    (4)PET SATISH 10/21    (5)R inguinal LN Bx showed reactive LN, no cancer seen.    (6)He has another R inguinal LN on exam , 3 cm x's 1 cm on exam, evident on his standing.    (7) PET scan does not show hypermetabolism and is negative for malignant change.  July, 2024.    (8) status post syncope, moderate aortic stenosis, under evaluation per Dr. Khan.    Return to clinic here in 6 months can cancel, return to clinic in 1 year.

## 2024-07-22 NOTE — LETTER
July 22, 2024        Brittany Neves MD  [address] [facility]  [address]  [place] [address]             [facility] Hematology On[facility]  [address]  [address]  [place] [address]  Phone: [phone]  Fax: [phone]   Patient: Michael Hart   MR Number: [id number]   YOB: 1980   Date of Visit: 7/22/2024       Dear Dr. Neves:    Thank you for referring Michael Hart to me for evaluation. Below are the relevant portions of my assessment and plan of care.            If you have questions, please do not hesitate to call me. I look forward to following  along with you.    Sincerely,      AMRCEL Sahu MD           CC    No Recipients

## 2024-07-31 ENCOUNTER — OFFICE VISIT (OUTPATIENT)
Dept: FAMILY MEDICINE | Facility: CLINIC | Age: 44
End: 2024-07-31
Payer: MEDICAID

## 2024-07-31 ENCOUNTER — TELEPHONE (OUTPATIENT)
Dept: FAMILY MEDICINE | Facility: CLINIC | Age: 44
End: 2024-07-31
Payer: MEDICAID

## 2024-07-31 DIAGNOSIS — F90.0 ATTENTION DEFICIT HYPERACTIVITY DISORDER (ADHD), PREDOMINANTLY INATTENTIVE TYPE: ICD-10-CM

## 2024-07-31 DIAGNOSIS — F90.0 ATTENTION DEFICIT HYPERACTIVITY DISORDER (ADHD), PREDOMINANTLY INATTENTIVE TYPE: Primary | ICD-10-CM

## 2024-07-31 PROCEDURE — 1160F RVW MEDS BY RX/DR IN RCRD: CPT | Mod: CPTII,95,, | Performed by: FAMILY MEDICINE

## 2024-07-31 PROCEDURE — 1159F MED LIST DOCD IN RCRD: CPT | Mod: CPTII,95,, | Performed by: FAMILY MEDICINE

## 2024-07-31 PROCEDURE — 99213 OFFICE O/P EST LOW 20 MIN: CPT | Mod: 95,,, | Performed by: FAMILY MEDICINE

## 2024-07-31 RX ORDER — DEXTROAMPHETAMINE SACCHARATE, AMPHETAMINE ASPARTATE, DEXTROAMPHETAMINE SULFATE AND AMPHETAMINE SULFATE 2.5; 2.5; 2.5; 2.5 MG/1; MG/1; MG/1; MG/1
10 TABLET ORAL DAILY
Qty: 30 TABLET | Refills: 0 | Status: SHIPPED | OUTPATIENT
Start: 2024-07-31

## 2024-07-31 NOTE — PROGRESS NOTES
Subjective:       Patient ID: Michael Hart is a 44 y.o. male.    Chief Complaint: No chief complaint on file.    3 month f/u on Adderal Rx - still doing well with daily work-day use.  He did have an event of syncope assoc with pounding heart while walking on a trip in [facility] -   f/u ECHO done per Dr Estrada and a PET scan with Dr Sahu - all clear on that.  He has VS from home-  recent /70 and HR 73  and at recent OV's:                            Vitals - 1 value per visit                                                                    SYSTOLIC                         DIASTOLIC     Pulse     7/10/2024       120                                    70                    83          7/15/2024                                                                                        7/22/2024       133                                    87                    93                  Review of Systems   Constitutional:  Negative for fever.   Respiratory:  Negative for shortness of breath.    Cardiovascular:  Negative for chest pain.   Gastrointestinal:  Negative for abdominal pain and nausea.   Skin:  Negative for rash.   Neurological:  Negative for numbness.   All other systems reviewed and are negative.      Objective:      Physical Exam  Vitals reviewed.   Cardiovascular:      Rate and Rhythm: Normal rate and regular rhythm.   Neurological:      Mental Status: He is alert.         Assessment:       1. Attention deficit hyperactivity disorder (ADHD), predominantly inattentive type        Plan:       Attention deficit hyperactivity disorder (ADHD), predominantly inattentive type    Refilled Adderall today; will consult with Dr Estrada re going forward.

## 2024-07-31 NOTE — TELEPHONE ENCOUNTER
----- Message from Milton Quinonez MD sent at 7/31/2024 12:26 PM CDT -----  Please make a Telemed visit for Jose at 4PM today.  Thanks

## 2024-08-01 ENCOUNTER — TELEPHONE (OUTPATIENT)
Dept: CARDIOLOGY | Facility: CLINIC | Age: 44
End: 2024-08-01
Payer: MEDICAID

## 2024-08-01 DIAGNOSIS — R55 SYNCOPE AND COLLAPSE: Primary | ICD-10-CM

## 2024-08-01 DIAGNOSIS — I35.0 NONRHEUMATIC AORTIC VALVE STENOSIS: ICD-10-CM

## 2024-08-01 RX ORDER — LEVOTHYROXINE SODIUM 100 UG/1
100 TABLET ORAL DAILY
Qty: 30 TABLET | Refills: 6 | Status: SHIPPED | OUTPATIENT
Start: 2024-08-01

## 2024-08-09 RX ORDER — DEXTROAMPHETAMINE SACCHARATE, AMPHETAMINE ASPARTATE, DEXTROAMPHETAMINE SULFATE AND AMPHETAMINE SULFATE 5; 5; 5; 5 MG/1; MG/1; MG/1; MG/1
TABLET ORAL
Qty: 15 TABLET | Refills: 0 | Status: SHIPPED | OUTPATIENT
Start: 2024-08-09

## 2024-08-30 ENCOUNTER — TELEPHONE (OUTPATIENT)
Dept: CARDIOLOGY | Facility: HOSPITAL | Age: 44
End: 2024-08-30

## 2024-09-10 RX ORDER — DEXTROAMPHETAMINE SACCHARATE, AMPHETAMINE ASPARTATE, DEXTROAMPHETAMINE SULFATE AND AMPHETAMINE SULFATE 5; 5; 5; 5 MG/1; MG/1; MG/1; MG/1
1 TABLET ORAL DAILY
Qty: 30 TABLET | Refills: 0 | Status: SHIPPED | OUTPATIENT
Start: 2024-09-10 | End: 2024-09-12 | Stop reason: SDUPTHER

## 2024-09-12 ENCOUNTER — TELEPHONE (OUTPATIENT)
Dept: FAMILY MEDICINE | Facility: CLINIC | Age: 44
End: 2024-09-12
Payer: MEDICAID

## 2024-09-12 DIAGNOSIS — F98.8 ATTENTION DEFICIT DISORDER (ADD) IN ADULT: ICD-10-CM

## 2024-09-12 DIAGNOSIS — F90.2 ATTENTION DEFICIT HYPERACTIVITY DISORDER, COMBINED TYPE: Primary | ICD-10-CM

## 2024-09-12 DIAGNOSIS — F98.8 ATTENTION DEFICIT DISORDER (ADD) IN ADULT: Primary | ICD-10-CM

## 2024-09-12 DIAGNOSIS — F90.0 ATTENTION DEFICIT HYPERACTIVITY DISORDER (ADHD), PREDOMINANTLY INATTENTIVE TYPE: ICD-10-CM

## 2024-09-12 DIAGNOSIS — F90.0 ATTENTION DEFICIT HYPERACTIVITY DISORDER (ADHD), PREDOMINANTLY INATTENTIVE TYPE: Primary | ICD-10-CM

## 2024-09-12 RX ORDER — DEXTROAMPHETAMINE SACCHARATE, AMPHETAMINE ASPARTATE, DEXTROAMPHETAMINE SULFATE AND AMPHETAMINE SULFATE 5; 5; 5; 5 MG/1; MG/1; MG/1; MG/1
1 TABLET ORAL DAILY
Qty: 30 TABLET | Refills: 0 | Status: SHIPPED | OUTPATIENT
Start: 2024-09-12

## 2024-09-12 RX ORDER — DEXTROAMPHETAMINE SACCHARATE, AMPHETAMINE ASPARTATE, DEXTROAMPHETAMINE SULFATE AND AMPHETAMINE SULFATE 5; 5; 5; 5 MG/1; MG/1; MG/1; MG/1
1 TABLET ORAL DAILY
Qty: 30 TABLET | Refills: 0 | Status: SHIPPED | OUTPATIENT
Start: 2024-09-12 | End: 2024-09-12

## 2024-09-12 RX ORDER — DEXTROAMPHETAMINE SACCHARATE, AMPHETAMINE ASPARTATE, DEXTROAMPHETAMINE SULFATE AND AMPHETAMINE SULFATE 5; 5; 5; 5 MG/1; MG/1; MG/1; MG/1
1 TABLET ORAL DAILY
Qty: 30 TABLET | Refills: 0 | Status: SHIPPED | OUTPATIENT
Start: 2024-09-12 | End: 2024-09-12 | Stop reason: SDUPTHER

## 2024-09-12 NOTE — TELEPHONE ENCOUNTER
Handled by Dr Quinonez      ----- Message from Milton Law sent at 9/12/2024  1:16 PM CDT -----  Contact: Bertin  Type:  Pharmacy Calling to Clarify an RX    Name of Caller:  Bertin  Pharmacy Name:    [id number] - [place], [place] - [address] [facility]  [address] [facility] [address]  Phone: [phone] Fax: [phone]      Prescription Name:  dextroamphetamine-amphetamine (ADDERALL) 20 mg tablet  What do they need to clarify?:  Bertin states they need a new diagnosis code for the patients adderall. Patient is at the pharmacy

## 2024-09-17 ENCOUNTER — TELEPHONE (OUTPATIENT)
Dept: CARDIOLOGY | Facility: HOSPITAL | Age: 44
End: 2024-09-17

## 2024-09-18 ENCOUNTER — CLINICAL SUPPORT (OUTPATIENT)
Dept: CARDIOLOGY | Facility: HOSPITAL | Age: 44
End: 2024-09-18
Attending: INTERNAL MEDICINE
Payer: MEDICAID

## 2024-09-18 DIAGNOSIS — I35.0 NONRHEUMATIC AORTIC VALVE STENOSIS: ICD-10-CM

## 2024-09-18 DIAGNOSIS — I47.10 SVT (SUPRAVENTRICULAR TACHYCARDIA): Primary | ICD-10-CM

## 2024-09-18 DIAGNOSIS — R55 SYNCOPE AND COLLAPSE: ICD-10-CM

## 2024-09-18 LAB
CV STRESS BASE HR: 84 BPM
DIASTOLIC BLOOD PRESSURE: 72 MMHG
OHS CV CPX 1 MINUTE RECOVERY HEART RATE: 138 BPM
OHS CV CPX 85 PERCENT MAX PREDICTED HEART RATE MALE: 148
OHS CV CPX ESTIMATED METS: 10.3
OHS CV CPX MAX PREDICTED HEART RATE: 174
OHS CV CPX PATIENT IS FEMALE: 0
OHS CV CPX PATIENT IS MALE: 1
OHS CV CPX PEAK DIASTOLIC BLOOD PRESSURE: 86 MMHG
OHS CV CPX PEAK HEAR RATE: 170 BPM
OHS CV CPX PEAK RATE PRESSURE PRODUCT: NORMAL
OHS CV CPX PEAK SYSTOLIC BLOOD PRESSURE: 160 MMHG
OHS CV CPX PERCENT MAX PREDICTED HEART RATE ACHIEVED: 98
OHS CV CPX RATE PRESSURE PRODUCT PRESENTING: NORMAL
POST STRESS EJECTION FRACTION: 68 %
STRESS ECHO POST EXERCISE DUR MIN: 9 MINUTES
STRESS ECHO POST EXERCISE DUR SEC: 0 SECONDS
SYSTOLIC BLOOD PRESSURE: 129 MMHG

## 2024-09-18 PROCEDURE — 93351 STRESS TTE COMPLETE: CPT | Mod: 26,,, | Performed by: INTERNAL MEDICINE

## 2024-09-18 PROCEDURE — 93351 STRESS TTE COMPLETE: CPT

## 2024-09-19 ENCOUNTER — HOSPITAL ENCOUNTER (OUTPATIENT)
Dept: CARDIOLOGY | Facility: CLINIC | Age: 44
Discharge: HOME | End: 2024-09-19
Attending: INTERNAL MEDICINE
Payer: MEDICAID

## 2024-09-19 DIAGNOSIS — I47.10 SVT (SUPRAVENTRICULAR TACHYCARDIA): ICD-10-CM

## 2024-09-19 NOTE — PROGRESS NOTES
Subjective     HPI    I had the pleasure of seeing Michael Hart in consultation at your request for the evaluation of SVT. 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 This occurred while walking in [place]. During an exercise stress echo in 9/2024, salvos of nonsustained AT were seen. At 32 seconds into recovery, an episode of what appears to be sustained SVT was noted at 165 bpm. Based on available ECGs this appears to gradually slow down to sinus tachycardia.    Echo in 7/2024 showed EF 55%, mod AS, mod AI.    Pt is currently wearing 7 day monitor.    Pt states he currently experiences palpitations roughly once weekly, generally occurring while exercising.    Review of Systems   Constitutional: Negative for decreased appetite, malaise/fatigue, weight gain and weight loss.   HENT:  Negative for sore throat.    Eyes:  Negative for blurred vision.   Cardiovascular:  Positive for palpitations. Negative for chest pain, dyspnea on exertion, irregular heartbeat, leg swelling, near-syncope, orthopnea, paroxysmal nocturnal dyspnea and syncope.   Respiratory:  Negative for shortness of breath.    Skin:  Negative for rash.   Musculoskeletal:  Negative for arthritis.   Gastrointestinal:  Negative for abdominal pain.   Neurological:  Negative for focal weakness.   Psychiatric/Behavioral:  Negative for altered mental status.           Objective     Physical Exam  Vitals and nursing note reviewed.   Constitutional:       General: He is not in acute distress.     Appearance: He is well-developed.   HENT:      Head: Normocephalic and atraumatic.   Eyes:      General: No scleral icterus.     Pupils: Pupils are equal, round, and reactive to light.   Neck:      Thyroid: No thyromegaly.    Cardiovascular:      Rate and Rhythm: Regular rhythm.      Pulses: Normal pulses.      Heart sounds: Normal heart sounds. No murmur heard.     No friction rub. No gallop.   Pulmonary:      Effort: Pulmonary effort is normal.      Breath sounds: Normal breath sounds.   Abdominal:      General: Bowel sounds are normal. There is no distension.      Palpations: Abdomen is soft.      Tenderness: There is no abdominal tenderness.   Musculoskeletal:      Cervical back: Neck supple.   Skin:     General: Skin is warm and dry.      Findings: No rash.   Neurological:      Mental Status: He is alert and oriented to person, place, and time.   Psychiatric:         Behavior: Behavior normal.            Assessment and Plan     1. Syncope and collapse    2. Nonrheumatic aortic valve stenosis    3. Postablative hypothyroidism    4. SVT (supraventricular tachycardia)        Plan:     In summary, Michael Hart is a 44M with a history of palpitations and near syncope, and a recent stress echo notable for SVT (likely AT) during recovery.    Discussed options including starting AVN blocker (low-dose verapamil) vs EPS/ablation vs starting PRN diltiazem. Tentative plan is invasive EPS/SVT ablation. Risks/benefits discussed and he has agreed to proceed.    Plan may change pending Zio monitor result.    Thank you for allowing me to participate in the care of this patient. Please do not hesitate to call me with any questions or concerns.

## 2024-09-24 PROBLEM — I47.10 SVT (SUPRAVENTRICULAR TACHYCARDIA): Status: ACTIVE | Noted: 2024-09-24

## 2024-09-25 ENCOUNTER — OFFICE VISIT (OUTPATIENT)
Dept: CARDIOLOGY | Facility: CLINIC | Age: 44
End: 2024-09-25
Payer: MEDICAID

## 2024-09-25 VITALS
SYSTOLIC BLOOD PRESSURE: 120 MMHG | DIASTOLIC BLOOD PRESSURE: 80 MMHG | WEIGHT: 203 LBS | HEART RATE: 102 BPM | RESPIRATION RATE: 16 BRPM | OXYGEN SATURATION: 95 % | HEIGHT: 74 IN | BODY MASS INDEX: 26.05 KG/M2

## 2024-09-25 DIAGNOSIS — E89.0 POSTABLATIVE HYPOTHYROIDISM: ICD-10-CM

## 2024-09-25 DIAGNOSIS — I47.10 SVT (SUPRAVENTRICULAR TACHYCARDIA): ICD-10-CM

## 2024-09-25 DIAGNOSIS — R55 SYNCOPE AND COLLAPSE: Primary | ICD-10-CM

## 2024-09-25 DIAGNOSIS — I35.0 NONRHEUMATIC AORTIC VALVE STENOSIS: ICD-10-CM

## 2024-09-25 PROCEDURE — 3074F SYST BP LT 130 MM HG: CPT | Mod: CPTII,,, | Performed by: INTERNAL MEDICINE

## 2024-09-25 PROCEDURE — 3008F BODY MASS INDEX DOCD: CPT | Mod: CPTII,,, | Performed by: INTERNAL MEDICINE

## 2024-09-25 PROCEDURE — 99213 OFFICE O/P EST LOW 20 MIN: CPT | Mod: PBBFAC,PN | Performed by: INTERNAL MEDICINE

## 2024-09-25 PROCEDURE — 3079F DIAST BP 80-89 MM HG: CPT | Mod: CPTII,,, | Performed by: INTERNAL MEDICINE

## 2024-09-25 PROCEDURE — 1159F MED LIST DOCD IN RCRD: CPT | Mod: CPTII,,, | Performed by: INTERNAL MEDICINE

## 2024-09-25 PROCEDURE — 99205 OFFICE O/P NEW HI 60 MIN: CPT | Mod: S$PBB,,, | Performed by: INTERNAL MEDICINE

## 2024-09-25 PROCEDURE — 99999 PR PBB SHADOW E&M-EST. PATIENT-LVL III: CPT | Mod: PBBFAC,,, | Performed by: INTERNAL MEDICINE

## 2024-10-24 DIAGNOSIS — I47.10 SVT (SUPRAVENTRICULAR TACHYCARDIA): Primary | ICD-10-CM

## 2024-11-08 ENCOUNTER — PATIENT MESSAGE (OUTPATIENT)
Dept: CARDIOLOGY | Facility: CLINIC | Age: 44
End: 2024-11-08
Payer: MEDICAID

## 2024-11-10 DIAGNOSIS — F90.2 ATTENTION DEFICIT HYPERACTIVITY DISORDER, COMBINED TYPE: ICD-10-CM

## 2024-11-10 RX ORDER — DEXTROAMPHETAMINE SACCHARATE, AMPHETAMINE ASPARTATE, DEXTROAMPHETAMINE SULFATE AND AMPHETAMINE SULFATE 5; 5; 5; 5 MG/1; MG/1; MG/1; MG/1
1 TABLET ORAL DAILY
Qty: 30 TABLET | Refills: 0 | Status: SHIPPED | OUTPATIENT
Start: 2024-11-10

## 2024-12-10 ENCOUNTER — PATIENT MESSAGE (OUTPATIENT)
Dept: ELECTROPHYSIOLOGY | Facility: CLINIC | Age: 44
End: 2024-12-10
Payer: MEDICAID

## 2024-12-16 NOTE — PROGRESS NOTES
"Subjective     HPI    I had the pleasure of seeing  Jose Hart in follow-up for his history of SVT. Last seen in 9/2024. 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 This occurred while walking in [place]. During an exercise stress echo in 9/2024, salvos of nonsustained AT were seen. At 32 seconds into recovery, an episode of what appears to be sustained SVT was noted at 165 bpm. Based on available ECGs this appears to gradually slow down to sinus tachycardia.    Echo in 7/2024 showed EF 55%, mod AS, mod AI.    A 7-day Zio in 9/2024 showed sinus rhythm with periods of possible AT with variable conduction.    At 9/2024 visit pt stated he currently experiences palpitations roughly once weekly, generally occurring while exercising. Various options discussed with pt at that visit including ". . . starting AVN blocker (low-dose verapamil) vs EPS/ablation vs starting PRN diltiazem. Tentative plan is invasive EPS/SVT ablation. Risks/benefits discussed and he has agreed to proceed."    Pt and Dr. Hart are in office today with several more questions about procedure.    Review of Systems   Constitutional: Negative for decreased appetite, malaise/fatigue, weight gain and weight loss.   HENT:  Negative for sore throat.    Eyes:  Negative for blurred vision.   Cardiovascular:  Positive for palpitations. Negative for chest pain, dyspnea on exertion, irregular heartbeat, leg swelling, near-syncope, orthopnea, paroxysmal nocturnal dyspnea and syncope.   Respiratory:  Negative for shortness of breath.    Skin:  Negative for rash.   Musculoskeletal:  Negative for arthritis.   Gastrointestinal:  Negative for abdominal pain.   Neurological:  Negative for focal weakness. "   Psychiatric/Behavioral:  Negative for altered mental status.           Objective     Physical Exam  Vitals and nursing note reviewed.   Constitutional:       General: He is not in acute distress.     Appearance: He is well-developed.   HENT:      Head: Normocephalic and atraumatic.   Eyes:      General: No scleral icterus.     Pupils: Pupils are equal, round, and reactive to light.   Neck:      Thyroid: No thyromegaly.   Cardiovascular:      Rate and Rhythm: Regular rhythm.      Pulses: Normal pulses.      Heart sounds: Normal heart sounds. No murmur heard.     No friction rub. No gallop.   Pulmonary:      Effort: Pulmonary effort is normal.      Breath sounds: Normal breath sounds.   Abdominal:      General: Bowel sounds are normal. There is no distension.      Palpations: Abdomen is soft.      Tenderness: There is no abdominal tenderness.   Musculoskeletal:      Cervical back: Neck supple.   Skin:     General: Skin is warm and dry.      Findings: No rash.   Neurological:      Mental Status: He is alert and oriented to person, place, and time.   Psychiatric:         Behavior: Behavior normal.            Assessment and Plan     1. SVT (supraventricular tachycardia)    2. Postablative hypothyroidism        Plan:     In summary, Michael Hart is a 44M with a history of palpitations and near syncope, and a recent stress echo notable for SVT (likely AT).    Again discussed options including starting AVN blocker (low-dose verapamil) vs EPS/ablation vs starting PRN diltiazem. Plan is invasive EPS/SVT ablation. Risks/benefits discussed and he has agreed to proceed.    Thank you for allowing me to participate in the care of this patient. Please do not hesitate to call me with any questions or concerns.

## 2024-12-18 ENCOUNTER — OFFICE VISIT (OUTPATIENT)
Dept: CARDIOLOGY | Facility: CLINIC | Age: 44
End: 2024-12-18
Payer: MEDICAID

## 2024-12-18 VITALS
OXYGEN SATURATION: 94 % | BODY MASS INDEX: 27.19 KG/M2 | RESPIRATION RATE: 16 BRPM | HEART RATE: 84 BPM | WEIGHT: 211.88 LBS | HEIGHT: 74 IN | DIASTOLIC BLOOD PRESSURE: 76 MMHG | SYSTOLIC BLOOD PRESSURE: 118 MMHG

## 2024-12-18 DIAGNOSIS — I47.10 SVT (SUPRAVENTRICULAR TACHYCARDIA): Primary | ICD-10-CM

## 2024-12-18 DIAGNOSIS — E89.0 POSTABLATIVE HYPOTHYROIDISM: ICD-10-CM

## 2024-12-18 PROCEDURE — 99213 OFFICE O/P EST LOW 20 MIN: CPT | Mod: PBBFAC,PN | Performed by: INTERNAL MEDICINE

## 2024-12-18 PROCEDURE — 3008F BODY MASS INDEX DOCD: CPT | Mod: CPTII,,, | Performed by: INTERNAL MEDICINE

## 2024-12-18 PROCEDURE — 99215 OFFICE O/P EST HI 40 MIN: CPT | Mod: S$PBB,,, | Performed by: INTERNAL MEDICINE

## 2024-12-18 PROCEDURE — 99999 PR PBB SHADOW E&M-EST. PATIENT-LVL III: CPT | Mod: PBBFAC,,, | Performed by: INTERNAL MEDICINE

## 2024-12-18 PROCEDURE — 1159F MED LIST DOCD IN RCRD: CPT | Mod: CPTII,,, | Performed by: INTERNAL MEDICINE

## 2024-12-18 PROCEDURE — 3078F DIAST BP <80 MM HG: CPT | Mod: CPTII,,, | Performed by: INTERNAL MEDICINE

## 2024-12-18 PROCEDURE — 3074F SYST BP LT 130 MM HG: CPT | Mod: CPTII,,, | Performed by: INTERNAL MEDICINE

## 2024-12-18 PROCEDURE — 1160F RVW MEDS BY RX/DR IN RCRD: CPT | Mod: CPTII,,, | Performed by: INTERNAL MEDICINE

## 2025-01-07 DIAGNOSIS — F90.2 ATTENTION DEFICIT HYPERACTIVITY DISORDER, COMBINED TYPE: ICD-10-CM

## 2025-01-07 RX ORDER — DEXTROAMPHETAMINE SACCHARATE, AMPHETAMINE ASPARTATE, DEXTROAMPHETAMINE SULFATE AND AMPHETAMINE SULFATE 5; 5; 5; 5 MG/1; MG/1; MG/1; MG/1
1 TABLET ORAL DAILY
Qty: 30 TABLET | Refills: 0 | Status: SHIPPED | OUTPATIENT
Start: 2025-01-07

## 2025-01-09 ENCOUNTER — LAB VISIT (OUTPATIENT)
Dept: LAB | Facility: HOSPITAL | Age: 45
End: 2025-01-09
Attending: INTERNAL MEDICINE
Payer: MEDICAID

## 2025-01-09 ENCOUNTER — TELEPHONE (OUTPATIENT)
Dept: ELECTROPHYSIOLOGY | Facility: CLINIC | Age: 45
End: 2025-01-09
Payer: MEDICAID

## 2025-01-09 DIAGNOSIS — I47.10 SVT (SUPRAVENTRICULAR TACHYCARDIA): ICD-10-CM

## 2025-01-09 LAB
ANION GAP SERPL CALC-SCNC: 9 MMOL/L (ref 8–16)
BASOPHILS # BLD AUTO: 0.04 K/UL (ref 0–0.2)
BASOPHILS NFR BLD: 0.6 % (ref 0–1.9)
BUN SERPL-MCNC: 13 MG/DL (ref 6–20)
CALCIUM SERPL-MCNC: 9.3 MG/DL (ref 8.7–10.5)
CHLORIDE SERPL-SCNC: 104 MMOL/L (ref 95–110)
CO2 SERPL-SCNC: 24 MMOL/L (ref 23–29)
CREAT SERPL-MCNC: 0.9 MG/DL (ref 0.5–1.4)
DIFFERENTIAL METHOD BLD: ABNORMAL
EOSINOPHIL # BLD AUTO: 0.1 K/UL (ref 0–0.5)
EOSINOPHIL NFR BLD: 2 % (ref 0–8)
ERYTHROCYTE [DISTWIDTH] IN BLOOD BY AUTOMATED COUNT: 11.7 % (ref 11.5–14.5)
EST. GFR  (NO RACE VARIABLE): >60 ML/MIN/1.73 M^2
GLUCOSE SERPL-MCNC: 96 MG/DL (ref 70–110)
HCT VFR BLD AUTO: 49.1 % (ref 40–54)
HGB BLD-MCNC: 16.6 G/DL (ref 14–18)
IMM GRANULOCYTES # BLD AUTO: 0.01 K/UL (ref 0–0.04)
IMM GRANULOCYTES NFR BLD AUTO: 0.2 % (ref 0–0.5)
LYMPHOCYTES # BLD AUTO: 1.2 K/UL (ref 1–4.8)
LYMPHOCYTES NFR BLD: 19.2 % (ref 18–48)
MCH RBC QN AUTO: 31.1 PG (ref 27–31)
MCHC RBC AUTO-ENTMCNC: 33.8 G/DL (ref 32–36)
MCV RBC AUTO: 92 FL (ref 82–98)
MONOCYTES # BLD AUTO: 0.4 K/UL (ref 0.3–1)
MONOCYTES NFR BLD: 6.9 % (ref 4–15)
NEUTROPHILS # BLD AUTO: 4.6 K/UL (ref 1.8–7.7)
NEUTROPHILS NFR BLD: 71.1 % (ref 38–73)
NRBC BLD-RTO: 0 /100 WBC
PLATELET # BLD AUTO: 234 K/UL (ref 150–450)
PMV BLD AUTO: 10.4 FL (ref 9.2–12.9)
POTASSIUM SERPL-SCNC: 4.4 MMOL/L (ref 3.5–5.1)
RBC # BLD AUTO: 5.33 M/UL (ref 4.6–6.2)
SODIUM SERPL-SCNC: 137 MMOL/L (ref 136–145)
WBC # BLD AUTO: 6.41 K/UL (ref 3.9–12.7)

## 2025-01-09 PROCEDURE — 85610 PROTHROMBIN TIME: CPT | Performed by: INTERNAL MEDICINE

## 2025-01-09 PROCEDURE — 80048 BASIC METABOLIC PNL TOTAL CA: CPT | Performed by: INTERNAL MEDICINE

## 2025-01-09 PROCEDURE — 36415 COLL VENOUS BLD VENIPUNCTURE: CPT | Mod: PO | Performed by: INTERNAL MEDICINE

## 2025-01-09 PROCEDURE — 85730 THROMBOPLASTIN TIME PARTIAL: CPT | Performed by: INTERNAL MEDICINE

## 2025-01-09 PROCEDURE — 85025 COMPLETE CBC W/AUTO DIFF WBC: CPT | Performed by: INTERNAL MEDICINE

## 2025-01-09 NOTE — TELEPHONE ENCOUNTER
Spoke with patient regarding missed appointment for blood work. Pt states he will have this completed today, appt rescheduled.

## 2025-01-10 LAB
APTT PPP: 31.9 SEC (ref 21–32)
INR PPP: 1 (ref 0.8–1.2)
PROTHROMBIN TIME: 11.1 SEC (ref 9–12.5)

## 2025-01-14 ENCOUNTER — TELEPHONE (OUTPATIENT)
Dept: ELECTROPHYSIOLOGY | Facility: CLINIC | Age: 45
End: 2025-01-14
Payer: MEDICAID

## 2025-01-14 NOTE — TELEPHONE ENCOUNTER
Left message asking patient to return call to go over instructions for SVT ablation scheduled on 1/16/25.

## 2025-01-14 NOTE — TELEPHONE ENCOUNTER
Spoke to patient    CONFIRMED procedure arrival time of 5:15 am    Reiterated instructions including:    -Directions to check in desk    -NPO after midnight night prior to procedure. Fasting upon arrival to the hospital the day of the procedure. Patient allowed to drink water up until 2 hours prior to arrival due to IV fluid shortage.     - Confirms no new meds prescribed or med changes since scheduling -     -Pre-procedure LABS Reviewed, no alerts noted    -Confirmed absence or presence of implanted device/stimulator n/a    -Confirmed no recent or current fever, cough, or shortness of breath .    -Confirmed no redness, rash, irritation, or yeast infection to skin/ chest / groin area.     Patient verbalized understanding of above, denies any further questions and appreciated the call.

## 2025-01-15 ENCOUNTER — ANESTHESIA EVENT (OUTPATIENT)
Dept: MEDSURG UNIT | Facility: HOSPITAL | Age: 45
End: 2025-01-15
Payer: MEDICAID

## 2025-01-16 ENCOUNTER — HOSPITAL ENCOUNTER (OUTPATIENT)
Facility: HOSPITAL | Age: 45
Discharge: HOME | End: 2025-01-16
Attending: INTERNAL MEDICINE | Admitting: INTERNAL MEDICINE
Payer: MEDICAID

## 2025-01-16 ENCOUNTER — ANESTHESIA (OUTPATIENT)
Dept: MEDSURG UNIT | Facility: HOSPITAL | Age: 45
End: 2025-01-16
Payer: MEDICAID

## 2025-01-16 VITALS
OXYGEN SATURATION: 98 % | HEIGHT: 74 IN | HEART RATE: 82 BPM | RESPIRATION RATE: 14 BRPM | DIASTOLIC BLOOD PRESSURE: 72 MMHG | BODY MASS INDEX: 27.08 KG/M2 | TEMPERATURE: 98 F | WEIGHT: 211 LBS | SYSTOLIC BLOOD PRESSURE: 116 MMHG

## 2025-01-16 DIAGNOSIS — I49.9 ARRHYTHMIA: ICD-10-CM

## 2025-01-16 DIAGNOSIS — I47.10 SVT (SUPRAVENTRICULAR TACHYCARDIA): Primary | ICD-10-CM

## 2025-01-16 LAB
OHS QRS DURATION: 90 MS
OHS QRS DURATION: 96 MS
OHS QRS DURATION: 96 MS
OHS QTC CALCULATION: 424 MS
OHS QTC CALCULATION: 434 MS
OHS QTC CALCULATION: 435 MS

## 2025-01-16 PROCEDURE — 25000003 PHARM REV CODE 250: Performed by: STUDENT IN AN ORGANIZED HEALTH CARE EDUCATION/TRAINING PROGRAM

## 2025-01-16 PROCEDURE — C1732 CATH, EP, DIAG/ABL, 3D/VECT: HCPCS | Performed by: INTERNAL MEDICINE

## 2025-01-16 PROCEDURE — 93010 ELECTROCARDIOGRAM REPORT: CPT | Mod: ICN,,, | Performed by: INTERNAL MEDICINE

## 2025-01-16 PROCEDURE — 93623 PRGRMD STIMJ&PACG IV RX NFS: CPT | Performed by: INTERNAL MEDICINE

## 2025-01-16 PROCEDURE — C1894 INTRO/SHEATH, NON-LASER: HCPCS | Performed by: INTERNAL MEDICINE

## 2025-01-16 PROCEDURE — 63600175 PHARM REV CODE 636 W HCPCS: Performed by: STUDENT IN AN ORGANIZED HEALTH CARE EDUCATION/TRAINING PROGRAM

## 2025-01-16 PROCEDURE — 93653 COMPRE EP EVAL TX SVT: CPT | Mod: ,,, | Performed by: INTERNAL MEDICINE

## 2025-01-16 PROCEDURE — 63600175 PHARM REV CODE 636 W HCPCS: Performed by: ANESTHESIOLOGY

## 2025-01-16 PROCEDURE — 93005 ELECTROCARDIOGRAM TRACING: CPT

## 2025-01-16 PROCEDURE — C1730 CATH, EP, 19 OR FEW ELECT: HCPCS | Performed by: INTERNAL MEDICINE

## 2025-01-16 PROCEDURE — D9220A PRA ANESTHESIA: Mod: ANES,,, | Performed by: ANESTHESIOLOGY

## 2025-01-16 PROCEDURE — 93653 COMPRE EP EVAL TX SVT: CPT | Performed by: INTERNAL MEDICINE

## 2025-01-16 PROCEDURE — D9220A PRA ANESTHESIA: Mod: CRNA,,, | Performed by: STUDENT IN AN ORGANIZED HEALTH CARE EDUCATION/TRAINING PROGRAM

## 2025-01-16 PROCEDURE — 93623 PRGRMD STIMJ&PACG IV RX NFS: CPT | Mod: 26,,, | Performed by: INTERNAL MEDICINE

## 2025-01-16 PROCEDURE — 27201423 OPTIME MED/SURG SUP & DEVICES STERILE SUPPLY: Performed by: INTERNAL MEDICINE

## 2025-01-16 PROCEDURE — 37000009 HC ANESTHESIA EA ADD 15 MINS: Performed by: INTERNAL MEDICINE

## 2025-01-16 PROCEDURE — 37000008 HC ANESTHESIA 1ST 15 MINUTES: Performed by: INTERNAL MEDICINE

## 2025-01-16 PROCEDURE — 63600175 PHARM REV CODE 636 W HCPCS: Performed by: INTERNAL MEDICINE

## 2025-01-16 RX ORDER — ASPIRIN 81 MG/1
81 TABLET ORAL DAILY
Qty: 30 TABLET | Refills: 0 | Status: SHIPPED | OUTPATIENT
Start: 2025-01-16 | End: 2025-02-15

## 2025-01-16 RX ORDER — LIDOCAINE HYDROCHLORIDE 20 MG/ML
INJECTION, SOLUTION INFILTRATION; PERINEURAL
Status: DISCONTINUED | OUTPATIENT
Start: 2025-01-16 | End: 2025-01-16 | Stop reason: HOSPADM

## 2025-01-16 RX ORDER — ACETAMINOPHEN 325 MG/1
650 TABLET ORAL EVERY 4 HOURS PRN
Status: DISCONTINUED | OUTPATIENT
Start: 2025-01-16 | End: 2025-01-16 | Stop reason: HOSPADM

## 2025-01-16 RX ORDER — LIDOCAINE HYDROCHLORIDE 20 MG/ML
INJECTION INTRAVENOUS
Status: DISCONTINUED | OUTPATIENT
Start: 2025-01-16 | End: 2025-01-16

## 2025-01-16 RX ORDER — SODIUM CHLORIDE 0.9 % (FLUSH) 0.9 %
10 SYRINGE (ML) INJECTION
Status: DISCONTINUED | OUTPATIENT
Start: 2025-01-16 | End: 2025-01-16 | Stop reason: HOSPADM

## 2025-01-16 RX ORDER — PROPOFOL 10 MG/ML
VIAL (ML) INTRAVENOUS
Status: DISCONTINUED | OUTPATIENT
Start: 2025-01-16 | End: 2025-01-16

## 2025-01-16 RX ORDER — ACETAMINOPHEN 10 MG/ML
1000 INJECTION, SOLUTION INTRAVENOUS ONCE
Status: COMPLETED | OUTPATIENT
Start: 2025-01-16 | End: 2025-01-16

## 2025-01-16 RX ORDER — PHENYLEPHRINE HCL IN 0.9% NACL 1 MG/10 ML
SYRINGE (ML) INTRAVENOUS
Status: DISCONTINUED | OUTPATIENT
Start: 2025-01-16 | End: 2025-01-16

## 2025-01-16 RX ORDER — HEPARIN SOD,PORCINE/0.9 % NACL 1000/500ML
INTRAVENOUS SOLUTION INTRAVENOUS
Status: DISCONTINUED | OUTPATIENT
Start: 2025-01-16 | End: 2025-01-16 | Stop reason: HOSPADM

## 2025-01-16 RX ORDER — HALOPERIDOL 5 MG/ML
0.5 INJECTION INTRAMUSCULAR EVERY 10 MIN PRN
Status: DISCONTINUED | OUTPATIENT
Start: 2025-01-16 | End: 2025-01-16 | Stop reason: HOSPADM

## 2025-01-16 RX ORDER — GLUCAGON 1 MG
1 KIT INJECTION
Status: DISCONTINUED | OUTPATIENT
Start: 2025-01-16 | End: 2025-01-16 | Stop reason: HOSPADM

## 2025-01-16 RX ADMIN — PROPOFOL 60 MG: 10 INJECTION, EMULSION INTRAVENOUS at 07:01

## 2025-01-16 RX ADMIN — SODIUM CHLORIDE: 0.9 INJECTION, SOLUTION INTRAVENOUS at 07:01

## 2025-01-16 RX ADMIN — LIDOCAINE HYDROCHLORIDE 40 MG: 20 INJECTION INTRAVENOUS at 07:01

## 2025-01-16 RX ADMIN — PROPOFOL 30 MG: 10 INJECTION, EMULSION INTRAVENOUS at 09:01

## 2025-01-16 RX ADMIN — ACETAMINOPHEN 1000 MG: 10 INJECTION, SOLUTION INTRAVENOUS at 10:01

## 2025-01-16 RX ADMIN — PROPOFOL 30 MG: 10 INJECTION, EMULSION INTRAVENOUS at 08:01

## 2025-01-16 RX ADMIN — Medication 100 MCG: at 09:01

## 2025-01-16 RX ADMIN — ISOPROTERENOL HYDROCHLORIDE 1 MCG/MIN: 0.2 INJECTION, SOLUTION INTRACARDIAC; INTRAMUSCULAR; INTRAVENOUS; SUBCUTANEOUS at 08:01

## 2025-01-16 RX ADMIN — PROPOFOL 20 MG: 10 INJECTION, EMULSION INTRAVENOUS at 08:01

## 2025-01-16 RX ADMIN — PROPOFOL 175 MCG/KG/MIN: 10 INJECTION, EMULSION INTRAVENOUS at 07:01

## 2025-01-16 RX ADMIN — PROPOFOL 30 MG: 10 INJECTION, EMULSION INTRAVENOUS at 07:01

## 2025-01-16 NOTE — ANESTHESIA POSTPROCEDURE EVALUATION
Anesthesia Post Evaluation    Patient: Michael Hart    Procedure(s) Performed: Procedure(s) (LRB):  ABLATION, ATRIAL FLUTTER, TYPICAL (N/A)    Final Anesthesia Type: general (Natural airway)      Patient location during evaluation: PACU  Patient participation: Yes- Able to Participate  Level of consciousness: awake and alert  Post-procedure vital signs: reviewed and stable  Pain management: adequate  Airway patency: patent    PONV status at discharge: No PONV  Anesthetic complications: no      Cardiovascular status: hemodynamically stable  Respiratory status: unassisted  Hydration status: euvolemic  Follow-up not needed.              Vitals Value Taken Time   /68 01/16/25 1215   Temp 36.6 °C (97.9 °F) 01/16/25 1215   Pulse 82 01/16/25 1215   Resp 14 01/16/25 1215   SpO2 99 % 01/16/25 1215         No case tracking events are documented in the log.      Pain/Magdiel Score: Pain Rating Prior to Med Admin: 2 (1/16/2025 10:31 AM)  Pain Rating Post Med Admin: 4 (1/16/2025 11:15 AM)  Magdiel Score: 9 (1/16/2025 11:00 AM)

## 2025-01-16 NOTE — TRANSFER OF CARE
"Anesthesia Transfer of Care Note    Patient: Michael Hart    Procedure(s) Performed: Procedure(s) (LRB):  ABLATION, ATRIAL FLUTTER, TYPICAL (N/A)    Patient location: PACU    Anesthesia Type: general    Transport from OR: Transported from OR on 6-10 L/min O2 by face mask with adequate spontaneous ventilation    Post pain: adequate analgesia    Post assessment: no apparent anesthetic complications and tolerated procedure well    Post vital signs: stable    Level of consciousness: awake, alert and oriented    Nausea/Vomiting: no nausea/vomiting    Complications: none    Transfer of care protocol was followedComments: Bedside report to PACU RN, opportunity for questions given.       Last vitals: Visit Vitals  /70 (BP Location: Right arm)   Pulse 87   Temp 37.2 °C (99 °F) (Temporal)   Resp 20   Ht 6' 2" (1.88 m)   Wt 95.7 kg (211 lb)   SpO2 96%   BMI 27.09 kg/m²     "

## 2025-01-16 NOTE — ANESTHESIA PREPROCEDURE EVALUATION
01/16/2025  Michael Hart is a 44 y.o., male.    Pre-operative evaluation for Procedure(s) (LRB):  Ablation, SVT, Accessory Pathway (N/A)    Michael Hart is a 44 y.o. male     Patient Active Problem List   Diagnosis    Lymph node enlargement    Nonrheumatic aortic valve stenosis    Hypercholesterolemia    Postablative hypothyroidism    Allergy to iodine    Syncope and collapse    Attention deficit hyperactivity disorder, combined type    SVT (supraventricular tachycardia)       Review of patient's allergies indicates:   Allergen Reactions    Ciprofloxacin Hives and Itching    Levaquin [levofloxacin] Swelling     And itching    Iodine and iodide containing products Itching     And IV contrast         No current facility-administered medications on file prior to encounter.     Current Outpatient Medications on File Prior to Encounter   Medication Sig Dispense Refill    levothyroxine (SYNTHROID) 100 MCG tablet Take 1 tablet (100 mcg total) by mouth once daily. 30 tablet 6    atorvastatin (LIPITOR) 10 MG tablet Take 1 tablet (10 mg total) by mouth once daily. (Patient taking differently: Take 10 mg by mouth once daily. States currently takes every other day) 90 tablet 3    ezetimibe (ZETIA) 10 mg tablet Take 1 tablet (10 mg total) by mouth once daily. (Patient not taking: Reported on 12/18/2024) 90 tablet 3    hydrocortisone (ANUSOL-HC) 25 mg suppository Place 25 mg rectally 2 (two) times daily.      hydrocortisone-pramoxine (PROCTOFOAM-HS) rectal foam Place 1 applicator rectally 2 (two) times daily. 20 g 3       Past Surgical History:   Procedure Laterality Date    BIOPSY OF INGUINAL LYMPH NODE Right 10/22/2021    Procedure: BIOPSY, LYMPH NODE, INGUINAL;  Surgeon: Trey Lora III, MD;  Location: [facility];  Service: General;  Laterality: Right;  right inguinal lymph node biopsy    HERNIA REPAIR    "   as a child    RHINOPLASTY      TONSILLECTOMY      turmor removed from chest      also pericardium        Social History     Socioeconomic History    Marital status: Single   Tobacco Use    Smoking status: Never    Smokeless tobacco: Never   Substance and Sexual Activity    Alcohol use: Yes     Alcohol/week: 1.0 standard drink of alcohol     Types: 1 Glasses of wine per week     Comment: nightly    Drug use: Never     Social Drivers of Health     Financial Resource Strain: Patient Declined (12/5/2023)    Overall Financial Resource Strain (CARDIA)     Difficulty of Paying Living Expenses: Patient declined   Food Insecurity: Patient Declined (12/5/2023)    Hunger Vital Sign     Worried About Running Out of Food in the Last Year: Patient declined     Ran Out of Food in the Last Year: Patient declined   Transportation Needs: Patient Declined (12/5/2023)    PRAPARE - Transportation     Lack of Transportation (Medical): Patient declined     Lack of Transportation (Non-Medical): Patient declined   Physical Activity: Sufficiently Active (12/5/2023)    Exercise Vital Sign     Days of Exercise per Week: 5 days     Minutes of Exercise per Session: 40 min   Stress: No Stress Concern Present (12/5/2023)    [employer] - Occupational Stress Questionnaire     Feeling of Stress : Not at all   Housing Stability: Unknown (12/5/2023)    Housing Stability Vital Sign     Unable to Pay for Housing in the Last Year: Patient refused     Unstable Housing in the Last Year: Patient refused         CBC: No results for input(s): "WBC", "RBC", "HGB", "HCT", "PLT", "MCV", "MCH", "MCHC" in the last 72 hours.    CMP: No results for input(s): "NA", "K", "CL", "CO2", "BUN", "CREATININE", "GLU", "MG", "PHOS", "CALCIUM", "ALBUMIN", "PROT", "ALKPHOS", "ALT", "AST", "BILITOT" in the last 72 hours.    INR  No results for input(s): "PT", "INR", "PROTIME", "APTT" in the last 72 hours.        Diagnostic " Studies:      EKD Echo:  No results found for this or any previous visit.        Pre-op Assessment    I have reviewed the Patient Summary Reports.    I have reviewed the NPO Status.   I have reviewed the Medications.     Review of Systems  Anesthesia Hx:  No problems with previous Anesthesia               Denies Personal Hx of Anesthesia complications.                    Hematology/Oncology:                        --  Cancer in past history:                     Cardiovascular:  Exercise tolerance: good       Dysrhythmias          ECG has been reviewed.                            Pulmonary:  Pulmonary Normal                       Neurological:    Denies CVA.    Denies Seizures.                                    Physical Exam  General: Cooperative, Alert and Oriented    Airway:  Mallampati: II   Mouth Opening: Normal  TM Distance: Normal  Tongue: Normal  Neck ROM: Normal ROM    Dental:  Intact        Anesthesia Plan  Type of Anesthesia, risks & benefits discussed:    Anesthesia Type: Gen Natural Airway  Intra-op Monitoring Plan: Standard ASA Monitors  Post Op Pain Control Plan: multimodal analgesia and IV/PO Opioids PRN  Induction:  IV  Informed Consent: Informed consent signed with the Patient and all parties understand the risks and agree with anesthesia plan.  All questions answered.   ASA Score: 2  Day of Surgery Review of History & Physical: H&P Update referred to the surgeon/provider.  Anesthesia Plan Notes: Propofol only sedation, avoid narcotics post op if possible given hx of significant constipation with narcotic exposure    Ready For Surgery From Anesthesia Perspective.     .

## 2025-01-16 NOTE — PLAN OF CARE
Dr. Solis @ bedside to discuss findings with patient. Patient able to eat, ambulate and void independently post procedure. Discharge instructions and prescriptions reviewed with patient. Patient verbalizes understanding. VSS. IV removed. NADN. Patient wheeled out via father per request.

## 2025-01-16 NOTE — PLAN OF CARE
Vss. S/p flutter ablation. Pt arrived to ep pacu 4 from ep lab with homer groin incisions. Manual pressure in ep lab, hemostasis at 1000. Bedrest x3hrs done at 1300. No hematoma or drainage noted. Palpable pulses noted. Due to void later. Denies bladder pressure. Pt complaints of low back pain. X1 iv tylenol given per md order.  At this time tolerable. blanket rolls placed under homer knees. Pts dad updated over text messaging system. Awaiting sscu bed. Remains in ep pacu 4. 12 lead EKG done per md order. See flowsheet for full assessment. Pt tolerating water in ep pacu.

## 2025-01-16 NOTE — DISCHARGE SUMMARY
[facility] - [facility]iology  Cardiac Electrophysiology  Discharge Summary        Patient Name: Michael Hart   MRN: [id number]   Admission Date: 1/16/2025    Hospital Length of Stay: 0   Discharge Date: 01/16/2025   Attending Physician: Christian Cueto MD   Discharging Provider: Zack Solis MD      HPI:   Mr Michael Hart is a 44 year old gentleman with PMH of supraventricular tachycardia, h/o NHL s/p chemo/radiation 1998, hypothyroidism who presents to [facility] for EPS +/- SVT ablation with Dr. Cueto. He has history of syncope and subsequent cardiac workup showing regular narrow complex tachycardia induced during exercise stress testing. He was offered EPS +/- SVT ablation by Dr. Cueto and agreed to proceed.         Presenting EKG: NSR, narrow QRS, normal intervals, no evidence of pre excitation   CHADS-VASc: N/A  Anticoagulants: None   Antiarrhythmics/AVN blockers: None   LV EF: 55% (TTE 7/2024)   Pertinent labs: Hgb 16.6, INR 1.0, Cr 0.9    Hospital Course:   Mr Hart underwent successful EP study and CTI ablation for inducible typical atrial flutter. He tolerated the procedure well with no immediate complications. He completed bed rest and ambulated without bleeding/hematoma. He was discharged home in stable condition.     Follow up:   Follow up with Dr. Cueto in 3 months     Disposition:   Home or Self Care         Medication List        START taking these medications      aspirin 81 MG EC tablet  Commonly known as: ECOTRIN  Take 1 tablet (81 mg total) by mouth once daily.            CHANGE how you take these medications      atorvastatin 10 MG tablet  Commonly known as: LIPITOR  Take 1 tablet (10 mg total) by mouth once daily.  What changed: additional instructions            CONTINUE taking these medications      dextroamphetamine-amphetamine 20 mg tablet  Commonly known as: ADDERALL  Take 1 tablet by mouth once daily.     ezetimibe 10 mg tablet  Commonly known as: ZETIA  Take 1 tablet (10 mg total) by mouth once  daily.     hydrocortisone 25 mg suppository  Commonly known as: ANUSOL-HC     hydrocortisone-pramoxine rectal foam  Commonly known as: PROCTOFOAM-HS  Place 1 applicator rectally 2 (two) times daily.     levothyroxine 100 MCG tablet  Commonly known as: SYNTHROID  Take 1 tablet (100 mcg total) by mouth once daily.               Where to Get Your Medications        These medications were sent to [facility] [address] [address]      Hours: Always Open Phone: [phone]   aspirin 81 MG EC tablet         Zack Solis MD  [facility]  Electrophysiology, PGY-VII

## 2025-01-16 NOTE — NURSING
Pt brought to room 11 post procedure. Pt is AAOx4 and follows commands appropriately. Pt's is free from s/s of pain/distress with vs wnl. Pt's homer groin puncture sites are free from s/s of bleeding with dressings intact. D/C info reviewed with pt and pt's family. All questions and concerns addressed. Safety measures in place.

## 2025-01-16 NOTE — PLAN OF CARE
Patient arrived to room. PIV placed. Admit assessment completed. Plan of care discussed with patient. Father at bedside. Nurse call bell within reach. Will monitor

## 2025-01-16 NOTE — H&P
[facility] [place]  Electrophysiology  H&P      Michael Hart   YOB: 1980   Medical Record Number: [id number]   Attending Physician:    Date of Admission: 01/16/2025       Hospital Day:  0  Current Principal Problem:  <principal problem not specified>      History     Cc: supraventricular tachycardia     HPI  Mr Michael Hart is a 44 year old gentleman with PMH of supraventricular tachycardia, h/o NHL s/p chemo/radiation 1998, hypothyroidism who presents to [facility] for EPS +/- SVT ablation with Dr. Cueto. He has history of syncope and subsequent cardiac workup showing regular narrow complex tachycardia induced during exercise stress testing. He was offered EPS +/- SVT ablation by Dr. Cueto and agreed to proceed.       Presenting EKG: NSR, narrow QRS, normal intervals, no evidence of pre excitation   CHADS-VASc: N/A  Anticoagulants: None   Antiarrhythmics/AVN blockers: None   LV EF: 55% (TTE 7/2024)   Pertinent labs: Hgb 16.6, INR 1.0, Cr 0.9        Medications - Outpatient  Prior to Admission medications    Medication Sig Start Date End Date Taking? Authorizing Provider   dextroamphetamine-amphetamine (ADDERALL) 20 mg tablet Take 1 tablet by mouth once daily. 1/7/25  Yes Milton Quinonez MD   levothyroxine (SYNTHROID) 100 MCG tablet Take 1 tablet (100 mcg total) by mouth once daily. 8/1/24  Yes Dragan Khan MD   atorvastatin (LIPITOR) 10 MG tablet Take 1 tablet (10 mg total) by mouth once daily.  Patient taking differently: Take 10 mg by mouth once daily. States currently takes every other day 8/20/23 12/18/24  Michael Hart MD   ezetimibe (ZETIA) 10 mg tablet Take 1 tablet (10 mg total) by mouth once daily.  Patient not taking: Reported on 12/18/2024 8/20/23 8/19/24  Michael Hart MD   hydrocortisone (ANUSOL-HC) 25 mg suppository Place 25 mg rectally 2 (two) times daily. 10/24/21   Provider, Historical   hydrocortisone-pramoxine (PROCTOFOAM-HS) rectal foam Place 1  applicator rectally 2 (two) times daily. 10/29/21   Trey Lora III, MD         Medications - Current  Scheduled Meds:  Continuous Infusions:  PRN Meds:.      Allergies  Review of patient's allergies indicates:   Allergen Reactions    Ciprofloxacin Hives and Itching    Levaquin [levofloxacin] Swelling     And itching    Iodine and iodide containing products Itching     And IV contrast           Past Medical History  Past Medical History:   Diagnosis Date    Attention deficit hyperactivity disorder, combined type     Enlarged lymph node 10/2021    Hodgkin lymphoma 1998    Hypothyroidism          Past Surgical History  Past Surgical History:   Procedure Laterality Date    BIOPSY OF INGUINAL LYMPH NODE Right 10/22/2021    Procedure: BIOPSY, LYMPH NODE, INGUINAL;  Surgeon: Trey Lora III, MD;  Location: [facility];  Service: General;  Laterality: Right;  right inguinal lymph node biopsy    HERNIA REPAIR      as a child    RHINOPLASTY      TONSILLECTOMY      turmor removed from chest      also pericardium          Social History  Social History     Socioeconomic History    Marital status: Single   Tobacco Use    Smoking status: Never    Smokeless tobacco: Never   Substance and Sexual Activity    Alcohol use: Yes     Alcohol/week: 1.0 standard drink of alcohol     Types: 1 Glasses of wine per week     Comment: nightly    Drug use: Never     Social Drivers of Health     Financial Resource Strain: Patient Declined (12/5/2023)    Overall Financial Resource Strain (CARDIA)     Difficulty of Paying Living Expenses: Patient declined   Food Insecurity: Patient Declined (12/5/2023)    Hunger Vital Sign     Worried About Running Out of Food in the Last Year: Patient declined     Ran Out of Food in the Last Year: Patient declined   Transportation Needs: Patient Declined (12/5/2023)    PRAPARE - Transportation     Lack of Transportation (Medical): Patient declined     Lack of Transportation (Non-Medical): Patient  "declined   Physical Activity: Sufficiently Active (12/5/2023)    Exercise Vital Sign     Days of Exercise per Week: 5 days     Minutes of Exercise per Session: 40 min   Stress: No Stress Concern Present (12/5/2023)    [employer] - Occupational Stress Questionnaire     Feeling of Stress : Not at all   Housing Stability: Unknown (12/5/2023)    Housing Stability Vital Sign     Unable to Pay for Housing in the Last Year: Patient refused     Unstable Housing in the Last Year: Patient refused         ROS  10 point ROS performed and negative except as stated in HPI     Physical Examination         Vital Signs  Vitals  Temp: 99 °F (37.2 °C)  Temp Source: Temporal  Pulse: 87  Heart Rate Source: Monitor  Resp: 20  SpO2: 96 %  BP: 112/70  MAP (mmHg): 85  BP Location: Right arm  BP Method: Automatic  Patient Position: Lying          24 Hour VS Range    Temp:  [99 °F (37.2 °C)]   Pulse:  [87]   Resp:  [20]   BP: (111-112)/(67-70)   SpO2:  [96 %]   No intake or output data in the 24 hours ending 01/16/25 0637        Physical Exam:   Constitutional: no acute distress  HEENT: NCAT, EOMI, no scleral icterus  Cardiovascular: Regular rate and rhythm  Pulmonary: Normal respiratory effort   Abdomen: nontender, non-distended   Neuro: alert and oriented, no focal deficits  Extremities: warm, no edema   MSK: no deformities  Integument: intact, no rashes       Data       Recent Labs   Lab 01/09/25  1442   WBC 6.41   HGB 16.6   HCT 49.1           Recent Labs   Lab 01/09/25  1442   INR 1.0        Recent Labs   Lab 01/09/25  1442      K 4.4      CO2 24   BUN 13   CREATININE 0.9   ANIONGAP 9   CALCIUM 9.3        No results for input(s): "PROT", "ALBUMIN", "BILITOT", "ALKPHOS", "AST", "ALT" in the last 168 hours.     No results for input(s): "TROPONINI" in the last 168 hours.     No results found for: "BNP"    No results for input(s): "LABBLOO" in the last 168 hours.         Assessment & Plan   44 " year old gentleman with PMH of supraventricular tachycardia, h/o NHL s/p chemo/radiation 1998, hypothyroidism who presents to [facility] for EPS +/- SVT ablation with Dr. Cueto.    Supraventricular tachycardia:   Risks/benefits/alternatives discussed with patient and he agrees to proceed. Consents signed.   -To EP lab for EPS +/- SVT ablation     Zack Solis MD  [facility]  Electrophysiology, PGY-VII

## 2025-01-16 NOTE — NURSING TRANSFER
Nursing Transfer Note      1/16/2025   12:58 PM    Nurse giving handoff:rosalia,rn   Nurse receiving handoff:lisa sscu rn    Reason patient is being transferred: ep pacu 4 to sscu 11/home    Transfer To: ep pacu 4 to sscu 11/home    Transfer via stretcher    Transfer with cardiac monitoring, tele box on confirmed by tele tech    Transported by val lindsey    Transfer Vital Signs:  Blood Pressure:119/75  Heart Rate:67  O2:99%   Temperature:97.5  Respirations:16    Telemetry: Box Number [id number], Rate 67, Rhythm sr, and Telemetry  denkaylaa  Order for Tele Monitor? Yes    Additional Lines: none    Medicines sent: none    Any special needs or follow-up needed: bedrest x3hrs done at 1300    Patient belongings transferred with patient:  sscu locker    Chart send with patient: Yes    Notified: father    Patient reassessed at: 1/16/25 1145. Next due 1115  Upon arrival to floor: cardiac monitor applied, patient oriented to room, call bell in reach, and bed in lowest position, groin check done with sscu rn upon arrival.

## 2025-03-10 DIAGNOSIS — F90.2 ATTENTION DEFICIT HYPERACTIVITY DISORDER, COMBINED TYPE: ICD-10-CM

## 2025-03-10 RX ORDER — DEXTROAMPHETAMINE SACCHARATE, AMPHETAMINE ASPARTATE, DEXTROAMPHETAMINE SULFATE AND AMPHETAMINE SULFATE 5; 5; 5; 5 MG/1; MG/1; MG/1; MG/1
1 TABLET ORAL DAILY
Qty: 30 TABLET | Refills: 0 | Status: SHIPPED | OUTPATIENT
Start: 2025-03-10

## 2025-03-10 RX ORDER — LEVOTHYROXINE SODIUM 100 UG/1
100 TABLET ORAL
Qty: 30 TABLET | Refills: 6 | Status: SHIPPED | OUTPATIENT
Start: 2025-03-10

## 2025-03-31 NOTE — PROGRESS NOTES
"Subjective     HPI    I had the pleasure of seeing Michael Garcia Tanner in follow-up for his history of SVT. 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 This occurred while walking in [place]. During an exercise stress echo in 9/2024, salvos of nonsustained AT were seen. At 32 seconds into recovery, an episode of what appears to be sustained SVT was noted at 165 bpm. Based on available ECGs this appears to gradually slow down to sinus tachycardia.    Echo in 7/2024 showed EF 55%, mod AS, mod AI.    A 7-day Zio in 9/2024 showed sinus rhythm with periods of possible AT with variable conduction.    At 9/2024 visit pt stated he currently experiences palpitations roughly once weekly, generally occurring while exercising. Various options discussed with pt at that visit including ". . . starting AVN blocker (low-dose verapamil) vs EPS/ablation vs starting PRN diltiazem. Tentative plan is invasive EPS/SVT ablation. Risks/benefits discussed and he has agreed to proceed."    On 1/16/2025 an EPS was performed. Only typical flutter was induced--s/p CTI-line.    Today in the office pt states he is still having palps lasting a few seconds. Also some skipped beats.    My interpretation of today's ECG is sinus tachycardia at 102 bpm.    Review of Systems   Constitutional: Negative for decreased appetite, malaise/fatigue, weight gain and weight loss.   HENT:  Negative for sore throat.    Eyes:  Negative for blurred vision.   Cardiovascular:  Positive for palpitations. Negative for chest pain, dyspnea on exertion, irregular heartbeat, leg swelling, near-syncope, orthopnea, paroxysmal nocturnal dyspnea and syncope.   Respiratory:  Negative for shortness of breath.    Skin:  Negative for rash. "   Musculoskeletal:  Negative for arthritis.   Gastrointestinal:  Negative for abdominal pain.   Neurological:  Negative for focal weakness.   Psychiatric/Behavioral:  Negative for altered mental status.           Objective     Physical Exam  Vitals and nursing note reviewed.   Constitutional:       General: He is not in acute distress.     Appearance: He is well-developed.   HENT:      Head: Normocephalic and atraumatic.   Eyes:      General: No scleral icterus.     Pupils: Pupils are equal, round, and reactive to light.   Neck:      Thyroid: No thyromegaly.   Cardiovascular:      Rate and Rhythm: Regular rhythm.      Pulses: Normal pulses.      Heart sounds: Normal heart sounds. No murmur heard.     No friction rub. No gallop.   Pulmonary:      Effort: Pulmonary effort is normal.      Breath sounds: Normal breath sounds.   Abdominal:      General: Bowel sounds are normal. There is no distension.      Palpations: Abdomen is soft.      Tenderness: There is no abdominal tenderness.   Musculoskeletal:      Cervical back: Neck supple.   Skin:     General: Skin is warm and dry.      Findings: No rash.   Neurological:      Mental Status: He is alert and oriented to person, place, and time.   Psychiatric:         Behavior: Behavior normal.            Assessment and Plan     1. SVT (supraventricular tachycardia)    2. Hypercholesterolemia    3. Syncope and collapse        Plan:     In summary, Michael Hart is a 45M with a history of palpitations and near syncope, and a recent stress echo notable for SVT (likely AT). Pt is now 3 months status-post CTI-line for typical atrial flutter induced in EP lab.    Pt continues to complain of palps, albeit lasting seconds. Reassurance provided.    Plan is PRN follow-up.    Thank you for allowing me to participate in the care of this patient. Please do not hesitate to call me with any questions or concerns.

## 2025-04-02 ENCOUNTER — OFFICE VISIT (OUTPATIENT)
Dept: CARDIOLOGY | Facility: CLINIC | Age: 45
End: 2025-04-02
Payer: MEDICAID

## 2025-04-02 VITALS
HEART RATE: 102 BPM | DIASTOLIC BLOOD PRESSURE: 70 MMHG | BODY MASS INDEX: 26.18 KG/M2 | RESPIRATION RATE: 16 BRPM | OXYGEN SATURATION: 94 % | WEIGHT: 204 LBS | SYSTOLIC BLOOD PRESSURE: 118 MMHG | HEIGHT: 74 IN

## 2025-04-02 DIAGNOSIS — R55 SYNCOPE AND COLLAPSE: ICD-10-CM

## 2025-04-02 DIAGNOSIS — E78.00 HYPERCHOLESTEROLEMIA: ICD-10-CM

## 2025-04-02 DIAGNOSIS — I47.10 SVT (SUPRAVENTRICULAR TACHYCARDIA): Primary | ICD-10-CM

## 2025-04-02 PROCEDURE — 99213 OFFICE O/P EST LOW 20 MIN: CPT | Mod: PBBFAC,PN | Performed by: INTERNAL MEDICINE

## 2025-04-02 PROCEDURE — 1160F RVW MEDS BY RX/DR IN RCRD: CPT | Mod: CPTII,,, | Performed by: INTERNAL MEDICINE

## 2025-04-02 PROCEDURE — 3078F DIAST BP <80 MM HG: CPT | Mod: CPTII,,, | Performed by: INTERNAL MEDICINE

## 2025-04-02 PROCEDURE — 93005 ELECTROCARDIOGRAM TRACING: CPT | Mod: PBBFAC,PN | Performed by: INTERNAL MEDICINE

## 2025-04-02 PROCEDURE — 93010 ELECTROCARDIOGRAM REPORT: CPT | Mod: S$PBB,,, | Performed by: INTERNAL MEDICINE

## 2025-04-02 PROCEDURE — 99999 PR PBB SHADOW E&M-EST. PATIENT-LVL III: CPT | Mod: PBBFAC,,, | Performed by: INTERNAL MEDICINE

## 2025-04-02 PROCEDURE — 99214 OFFICE O/P EST MOD 30 MIN: CPT | Mod: S$PBB,,, | Performed by: INTERNAL MEDICINE

## 2025-04-02 PROCEDURE — 3074F SYST BP LT 130 MM HG: CPT | Mod: CPTII,,, | Performed by: INTERNAL MEDICINE

## 2025-04-02 PROCEDURE — 3008F BODY MASS INDEX DOCD: CPT | Mod: CPTII,,, | Performed by: INTERNAL MEDICINE

## 2025-04-02 PROCEDURE — 1159F MED LIST DOCD IN RCRD: CPT | Mod: CPTII,,, | Performed by: INTERNAL MEDICINE

## 2025-04-03 LAB
OHS QRS DURATION: 92 MS
OHS QTC CALCULATION: 448 MS

## 2025-06-24 ENCOUNTER — PATIENT MESSAGE (OUTPATIENT)
Dept: CARDIOLOGY | Facility: CLINIC | Age: 45
End: 2025-06-24
Payer: MEDICAID

## 2025-06-24 ENCOUNTER — HOSPITAL ENCOUNTER (INPATIENT)
Facility: OTHER | Age: 45
LOS: 2 days | Discharge: HOME | DRG: 244 | End: 2025-06-26
Attending: EMERGENCY MEDICINE | Admitting: INTERNAL MEDICINE
Payer: MEDICAID

## 2025-06-24 DIAGNOSIS — I49.9 ARRHYTHMIA: ICD-10-CM

## 2025-06-24 DIAGNOSIS — Z95.0 HISTORY OF PERMANENT CARDIAC PACEMAKER PLACEMENT: ICD-10-CM

## 2025-06-24 DIAGNOSIS — R00.1 BRADYCARDIA: ICD-10-CM

## 2025-06-24 DIAGNOSIS — R07.9 CHEST PAIN: ICD-10-CM

## 2025-06-24 DIAGNOSIS — I51.4 MYOCARDITIS: ICD-10-CM

## 2025-06-24 DIAGNOSIS — I44.2 COMPLETE HEART BLOCK: ICD-10-CM

## 2025-06-24 DIAGNOSIS — R06.00 DYSPNEA: ICD-10-CM

## 2025-06-24 DIAGNOSIS — I44.30 HEART BLOCK, AV: ICD-10-CM

## 2025-06-24 DIAGNOSIS — R06.09 DYSPNEA ON EXERTION: ICD-10-CM

## 2025-06-24 DIAGNOSIS — R00.1 SYMPTOMATIC BRADYCARDIA: Primary | ICD-10-CM

## 2025-06-24 PROBLEM — I45.9 HEART BLOCK: Status: ACTIVE | Noted: 2025-06-24

## 2025-06-24 PROBLEM — I48.92 ATRIAL FLUTTER: Status: ACTIVE | Noted: 2025-06-24

## 2025-06-24 LAB
ABSOLUTE EOSINOPHIL (OHS): 0.27 K/UL
ABSOLUTE MONOCYTE (OHS): 0.49 K/UL (ref 0.3–1)
ABSOLUTE NEUTROPHIL COUNT (OHS): 5.6 K/UL (ref 1.8–7.7)
ALBUMIN SERPL BCP-MCNC: 3.7 G/DL (ref 3.5–5.2)
ALP SERPL-CCNC: 51 UNIT/L (ref 40–150)
ALT SERPL W/O P-5'-P-CCNC: 18 UNIT/L (ref 10–44)
ANION GAP (OHS): 8 MMOL/L (ref 8–16)
AST SERPL-CCNC: 16 UNIT/L (ref 11–45)
AV INDEX (PROSTH): 0.39
AV MEAN GRADIENT: 14 MMHG
AV PEAK GRADIENT: 25 MMHG
AV VALVE AREA BY VELOCITY RATIO: 1.4 CM²
AV VALVE AREA: 1.5 CM²
AV VELOCITY RATIO: 0.36
BASOPHILS # BLD AUTO: 0.04 K/UL
BASOPHILS NFR BLD AUTO: 0.5 %
BILIRUB SERPL-MCNC: 0.7 MG/DL (ref 0.1–1)
BSA FOR ECHO PROCEDURE: 2.18 M2
BUN SERPL-MCNC: 9 MG/DL (ref 6–20)
CALCIUM SERPL-MCNC: 8.3 MG/DL (ref 8.7–10.5)
CHLORIDE SERPL-SCNC: 110 MMOL/L (ref 95–110)
CK SERPL-CCNC: 83 U/L (ref 20–200)
CO2 SERPL-SCNC: 23 MMOL/L (ref 23–29)
CREAT SERPL-MCNC: 1 MG/DL (ref 0.5–1.4)
CV ECHO LV RWT: 0.27 CM
DOP CALC AO PEAK VEL: 2.5 M/S
DOP CALC AO VTI: 53.6 CM
DOP CALC LVOT AREA: 3.8 CM2
DOP CALC LVOT DIAMETER: 2.2 CM
DOP CALC LVOT PEAK VEL: 0.9 M/S
DOP CALC LVOT STROKE VOLUME: 78.6 CM3
DOP CALCLVOT PEAK VEL VTI: 20.7 CM
E WAVE DECELERATION TIME: 86 MSEC
E/E' RATIO: 13 M/S
ECHO LV POSTERIOR WALL: 0.7 CM (ref 0.6–1.1)
ERYTHROCYTE [DISTWIDTH] IN BLOOD BY AUTOMATED COUNT: 12.2 % (ref 11.5–14.5)
FRACTIONAL SHORTENING: 35.3 % (ref 28–44)
GFR SERPLBLD CREATININE-BSD FMLA CKD-EPI: >60 ML/MIN/1.73/M2
GLUCOSE SERPL-MCNC: 117 MG/DL (ref 70–110)
HCT VFR BLD AUTO: 42.2 % (ref 40–54)
HCV AB SERPL QL IA: NEGATIVE
HGB BLD-MCNC: 15 GM/DL (ref 14–18)
HIV 1+2 AB+HIV1 P24 AG SERPL QL IA: NEGATIVE
HOLD SPECIMEN: NORMAL
IMM GRANULOCYTES # BLD AUTO: 0.01 K/UL (ref 0–0.04)
IMM GRANULOCYTES NFR BLD AUTO: 0.1 % (ref 0–0.5)
INTERVENTRICULAR SEPTUM: 0.8 CM (ref 0.6–1.1)
IVC DIAMETER: 2.01 CM
LA MAJOR: 4.3 CM
LA MINOR: 4.1 CM
LA WIDTH: 4.2 CM
LEFT ATRIUM SIZE: 3.5 CM
LEFT ATRIUM VOLUME INDEX: 24 ML/M2
LEFT ATRIUM VOLUME: 52 CM3
LEFT INTERNAL DIMENSION IN SYSTOLE: 3.3 CM (ref 2.1–4)
LEFT VENTRICLE DIASTOLIC VOLUME INDEX: 57.34 ML/M2
LEFT VENTRICLE DIASTOLIC VOLUME: 125 ML
LEFT VENTRICLE MASS INDEX: 59.4 G/M2
LEFT VENTRICLE SYSTOLIC VOLUME INDEX: 20.2 ML/M2
LEFT VENTRICLE SYSTOLIC VOLUME: 44 ML
LEFT VENTRICULAR INTERNAL DIMENSION IN DIASTOLE: 5.1 CM (ref 3.5–6)
LEFT VENTRICULAR MASS: 129.4 G
LV LATERAL E/E' RATIO: 14.5 M/S
LV SEPTAL E/E' RATIO: 12.3 M/S
LVED V (TEICH): 125.33 ML
LVES V (TEICH): 44.15 ML
LVOT MG: 1.67 MMHG
LVOT MV: 0.63 CM/S
LYMPHOCYTES # BLD AUTO: 1.42 K/UL (ref 1–4.8)
MCH RBC QN AUTO: 31.4 PG (ref 27–31)
MCHC RBC AUTO-ENTMCNC: 35.5 G/DL (ref 32–36)
MCV RBC AUTO: 88 FL (ref 82–98)
MV PEAK E VEL: 1.6 M/S
MV STENOSIS PRESSURE HALF TIME: 25.05 MS
MV VALVE AREA P 1/2 METHOD: 8.78 CM2
NUCLEATED RBC (/100WBC) (OHS): 0 /100 WBC
PISA MRMAX VEL: 4.88 M/S
PISA TR MAX VEL: 2.7 M/S
PLATELET # BLD AUTO: 177 K/UL (ref 150–450)
PMV BLD AUTO: 9.9 FL (ref 9.2–12.9)
POTASSIUM SERPL-SCNC: 4.5 MMOL/L (ref 3.5–5.1)
PROT SERPL-MCNC: 6.6 GM/DL (ref 6–8.4)
PULM VEIN S/D RATIO: 0.91
PV PEAK D VEL: 0.69 M/S
PV PEAK GRADIENT: 3 MMHG
PV PEAK S VEL: 0.63 M/S
PV PEAK VELOCITY: 0.87 M/S
RA MAJOR: 4.68 CM
RA PRESSURE ESTIMATED: 3 MMHG
RA WIDTH: 3.4 CM
RBC # BLD AUTO: 4.78 M/UL (ref 4.6–6.2)
RELATIVE EOSINOPHIL (OHS): 3.4 %
RELATIVE LYMPHOCYTE (OHS): 18.1 % (ref 18–48)
RELATIVE MONOCYTE (OHS): 6.3 % (ref 4–15)
RELATIVE NEUTROPHIL (OHS): 71.6 % (ref 38–73)
RV TB RVSP: 6 MMHG
SODIUM SERPL-SCNC: 141 MMOL/L (ref 136–145)
TDI LATERAL: 0.11 M/S
TDI SEPTAL: 0.13 M/S
TDI: 0.12 M/S
TR MAX PG: 30 MMHG
TROPONIN I SERPL DL<=0.01 NG/ML-MCNC: 0.01 NG/ML
TSH SERPL-ACNC: 0.74 UIU/ML (ref 0.4–4)
TV REST PULMONARY ARTERY PRESSURE: 32 MMHG
WBC # BLD AUTO: 7.83 K/UL (ref 3.9–12.7)
Z-SCORE OF LEFT VENTRICULAR DIMENSION IN END DIASTOLE: -3.57
Z-SCORE OF LEFT VENTRICULAR DIMENSION IN END SYSTOLE: -2.33

## 2025-06-24 PROCEDURE — 94799 UNLISTED PULMONARY SVC/PX: CPT

## 2025-06-24 PROCEDURE — 25000003 PHARM REV CODE 250: Performed by: STUDENT IN AN ORGANIZED HEALTH CARE EDUCATION/TRAINING PROGRAM

## 2025-06-24 PROCEDURE — 11000001 HC ACUTE MED/SURG PRIVATE ROOM

## 2025-06-24 PROCEDURE — 86803 HEPATITIS C AB TEST: CPT | Performed by: EMERGENCY MEDICINE

## 2025-06-24 PROCEDURE — 85025 COMPLETE CBC W/AUTO DIFF WBC: CPT | Performed by: EMERGENCY MEDICINE

## 2025-06-24 PROCEDURE — 84484 ASSAY OF TROPONIN QUANT: CPT | Performed by: EMERGENCY MEDICINE

## 2025-06-24 PROCEDURE — 87389 HIV-1 AG W/HIV-1&-2 AB AG IA: CPT | Performed by: EMERGENCY MEDICINE

## 2025-06-24 PROCEDURE — 80053 COMPREHEN METABOLIC PANEL: CPT | Performed by: EMERGENCY MEDICINE

## 2025-06-24 PROCEDURE — 82550 ASSAY OF CK (CPK): CPT | Performed by: EMERGENCY MEDICINE

## 2025-06-24 PROCEDURE — 85025 COMPLETE CBC W/AUTO DIFF WBC: CPT | Performed by: STUDENT IN AN ORGANIZED HEALTH CARE EDUCATION/TRAINING PROGRAM

## 2025-06-24 PROCEDURE — 93005 ELECTROCARDIOGRAM TRACING: CPT

## 2025-06-24 PROCEDURE — 99900035 HC TECH TIME PER 15 MIN (STAT)

## 2025-06-24 PROCEDURE — 84443 ASSAY THYROID STIM HORMONE: CPT | Performed by: EMERGENCY MEDICINE

## 2025-06-24 PROCEDURE — 99285 EMERGENCY DEPT VISIT HI MDM: CPT | Mod: 25

## 2025-06-24 PROCEDURE — 36415 COLL VENOUS BLD VENIPUNCTURE: CPT | Performed by: STUDENT IN AN ORGANIZED HEALTH CARE EDUCATION/TRAINING PROGRAM

## 2025-06-24 RX ORDER — SODIUM CHLORIDE 0.9 % (FLUSH) 0.9 %
10 SYRINGE (ML) INJECTION
Status: DISCONTINUED | OUTPATIENT
Start: 2025-06-24 | End: 2025-06-24

## 2025-06-24 RX ORDER — SODIUM CHLORIDE 0.9 % (FLUSH) 0.9 %
10 SYRINGE (ML) INJECTION EVERY 8 HOURS PRN
Status: DISCONTINUED | OUTPATIENT
Start: 2025-06-24 | End: 2025-06-26 | Stop reason: HOSPADM

## 2025-06-24 RX ORDER — NALOXONE HCL 0.4 MG/ML
0.02 VIAL (ML) INJECTION
Status: DISCONTINUED | OUTPATIENT
Start: 2025-06-24 | End: 2025-06-26 | Stop reason: HOSPADM

## 2025-06-24 RX ORDER — SODIUM CHLORIDE 0.9 % (FLUSH) 0.9 %
10 SYRINGE (ML) INJECTION
Status: DISCONTINUED | OUTPATIENT
Start: 2025-06-25 | End: 2025-06-26 | Stop reason: HOSPADM

## 2025-06-24 RX ORDER — ALUMINUM HYDROXIDE, MAGNESIUM HYDROXIDE, AND SIMETHICONE 1200; 120; 1200 MG/30ML; MG/30ML; MG/30ML
30 SUSPENSION ORAL EVERY 6 HOURS PRN
Status: DISCONTINUED | OUTPATIENT
Start: 2025-06-25 | End: 2025-06-26 | Stop reason: HOSPADM

## 2025-06-24 RX ORDER — ACETAMINOPHEN 325 MG/1
650 TABLET ORAL EVERY 4 HOURS PRN
Status: DISCONTINUED | OUTPATIENT
Start: 2025-06-25 | End: 2025-06-26 | Stop reason: HOSPADM

## 2025-06-24 RX ORDER — ONDANSETRON HYDROCHLORIDE 2 MG/ML
4 INJECTION, SOLUTION INTRAVENOUS EVERY 6 HOURS PRN
Status: DISCONTINUED | OUTPATIENT
Start: 2025-06-24 | End: 2025-06-26 | Stop reason: HOSPADM

## 2025-06-24 RX ORDER — ONDANSETRON HYDROCHLORIDE 2 MG/ML
4 INJECTION, SOLUTION INTRAVENOUS EVERY 8 HOURS PRN
Status: DISCONTINUED | OUTPATIENT
Start: 2025-06-24 | End: 2025-06-24

## 2025-06-24 RX ORDER — IBUPROFEN 600 MG/1
600 TABLET, FILM COATED ORAL 3 TIMES DAILY
Status: DISCONTINUED | OUTPATIENT
Start: 2025-06-24 | End: 2025-06-26 | Stop reason: HOSPADM

## 2025-06-24 RX ORDER — PANTOPRAZOLE SODIUM 40 MG/1
40 TABLET, DELAYED RELEASE ORAL DAILY
Status: DISCONTINUED | OUTPATIENT
Start: 2025-06-25 | End: 2025-06-26 | Stop reason: HOSPADM

## 2025-06-24 RX ORDER — TALC
6 POWDER (GRAM) TOPICAL NIGHTLY PRN
Status: DISCONTINUED | OUTPATIENT
Start: 2025-06-24 | End: 2025-06-24

## 2025-06-24 RX ORDER — TALC
6 POWDER (GRAM) TOPICAL NIGHTLY PRN
Status: DISCONTINUED | OUTPATIENT
Start: 2025-06-25 | End: 2025-06-26 | Stop reason: HOSPADM

## 2025-06-24 RX ORDER — ACETAMINOPHEN 325 MG/1
650 TABLET ORAL EVERY 8 HOURS PRN
Status: DISCONTINUED | OUTPATIENT
Start: 2025-06-24 | End: 2025-06-24 | Stop reason: SDUPTHER

## 2025-06-24 RX ORDER — LEVOTHYROXINE SODIUM 100 UG/1
100 TABLET ORAL
Status: DISCONTINUED | OUTPATIENT
Start: 2025-06-25 | End: 2025-06-26 | Stop reason: HOSPADM

## 2025-06-24 RX ORDER — ATORVASTATIN CALCIUM 10 MG/1
10 TABLET, FILM COATED ORAL DAILY
Status: DISCONTINUED | OUTPATIENT
Start: 2025-06-25 | End: 2025-06-26 | Stop reason: HOSPADM

## 2025-06-24 RX ORDER — TALC
6 POWDER (GRAM) TOPICAL NIGHTLY PRN
Status: DISCONTINUED | OUTPATIENT
Start: 2025-06-24 | End: 2025-06-24 | Stop reason: SDUPTHER

## 2025-06-24 RX ADMIN — IBUPROFEN 600 MG: 600 TABLET ORAL at 11:06

## 2025-06-24 NOTE — EICU
"Intervention Initiated From:  [facility] / EICU    Madelin intervened regarding:  Other    Comments: Virtual ICU Admission    Admit Date: 2025  LOS: 0  Code Status: Full Code   : 1980  Bed: BICU 02/BICU 02A:     Diagnosis: <principal problem not specified>    Patient  has a past medical history of Aortic stenosis, Attention deficit hyperactivity disorder, combined type, Enlarged lymph node, Hodgkin lymphoma, and Hypothyroidism.    Last VS: BP (!) 122/58   Pulse (!) 45   Temp 97.8 °F (36.6 °C) (Oral)   Resp 20   Ht 6' 2" (1.88 m)   Wt 91.2 kg (201 lb)   SpO2 96%   BMI 25.81 kg/m²       VICU Review    VICU nurse assessment :  Augustine completed, LDA documentation reconciliation completed, Skin care/wounds LDA reconciliation, and VTE prophylaxis review    No skin breakdown visualized    Nursing orders placed : IP SHAQ Peripheral IV Access          "

## 2025-06-24 NOTE — Clinical Note
Diagnosis: Symptomatic bradycardia [715634]   Reason for IP Medical Treatment  (Clinical interventions that can only be accomplished in the IP setting? ) :: Symptomatic bradycardia

## 2025-06-24 NOTE — CONSULTS
Cardiology Consult  6/24/2025  9:57 AM    Attending Cardiologist: Robbie Boggs M.D.  Primary Care Provider: Brittany Neves MD  Chief Complaint/Reason For Consultation:  GIL, bradycardia      Problem list  Problem List[1]    CC:  GIL    HPI:  Michael Hart is a 45 y.o.year-old male with PMH of hypothyroid, NHL (s/p chemo and XRT to mediastinum in 1998), aortic stenosis (mild-moderate), hypothyroidism, s/p ablation for atrial flutter (Jan 2025 with Dr Cueto)(had SVT on stress test) who came to the [facility] Emergency room with dyspnea on exertion which started 2 days ago.  Patient reports shortness of breath with his usual daily activities.  He states that he has been checking his heart rate with his blood pressure machine at home and it registered as low as 20.  Patient denies any chest pain.  Patient is currently hemodynamically stable.  He is not on any AV daniel blocking medications.  He has been compliant with his thyroid medication.  In the ER, his BP is 133/74, HR 45, 2:1 AVB on monitor.    Medications  Current Medications[2]   Prior to Admission medications    Medication Sig Start Date End Date Taking? Authorizing Provider   aspirin (ECOTRIN) 81 MG EC tablet Take 1 tablet (81 mg total) by mouth once daily. 1/16/25 2/15/25  Zack Solis MD   atorvastatin (LIPITOR) 10 MG tablet Take 1 tablet (10 mg total) by mouth once daily.  Patient taking differently: Take 10 mg by mouth once daily. States currently takes every other day 8/20/23 12/18/24  Michael Hart MD   dextroamphetamine-amphetamine (ADDERALL) 20 mg tablet Take 1 tablet by mouth once daily. 3/10/25   Milton Quinonez MD   ezetimibe (ZETIA) 10 mg tablet Take 1 tablet (10 mg total) by mouth once daily.  Patient not taking: Reported on 12/18/2024 8/20/23 8/19/24  Michael Hart MD   hydrocortisone (ANUSOL-HC) 25 mg suppository Place 25 mg rectally 2 (two) times daily. 10/24/21   Provider, Historical   hydrocortisone-pramoxine  (PROCTOFOAM-HS) rectal foam Place 1 applicator rectally 2 (two) times daily. 10/29/21   Trey Lora III, MD   levothyroxine (SYNTHROID) 100 MCG tablet TAKE 1 TABLET BY MOUTH EVERY DAY 3/10/25   Dragan Khan MD         History  Past Medical History:   Diagnosis Date    Aortic stenosis     Attention deficit hyperactivity disorder, combined type     Enlarged lymph node 10/2021    Hodgkin lymphoma 1998    Hypothyroidism      Past Surgical History:   Procedure Laterality Date    ABLATION, ATRIAL FLUTTER, TYPICAL N/A 1/16/2025    Procedure: ABLATION, ATRIAL FLUTTER, TYPICAL;  Surgeon: Christian Cueto MD;  Location: [facility] EP LAB;  Service: Cardiology;  Laterality: N/A;  SVT, RFA, CARTO, MAC, GP, 3 Prep    BIOPSY OF INGUINAL LYMPH NODE Right 10/22/2021    Procedure: BIOPSY, LYMPH NODE, INGUINAL;  Surgeon: Trey Lora III, MD;  Location: [facility] OR;  Service: General;  Laterality: Right;  right inguinal lymph node biopsy    HERNIA REPAIR      as a child    RHINOPLASTY      TONSILLECTOMY      turmor removed from chest      also pericardium      Social History[3]      Allergies  Review of patient's allergies indicates:   Allergen Reactions    Ciprofloxacin Hives and Itching    Levaquin [levofloxacin] Swelling     And itching    Iodine and iodide containing products Itching     And IV contrast           Review of Systems   Review of Systems   Constitutional: Negative for decreased appetite, fever and weight loss.   HENT:  Negative for congestion and nosebleeds.    Eyes:  Negative for double vision, vision loss in left eye, vision loss in right eye and visual disturbance.   Cardiovascular:  Positive for dyspnea on exertion and palpitations. Negative for chest pain, claudication, cyanosis, irregular heartbeat, leg swelling, near-syncope, orthopnea, paroxysmal nocturnal dyspnea and syncope.   Respiratory:  Negative for cough, hemoptysis, shortness of breath, sleep disturbances due to breathing, snoring, sputum  production and wheezing.    Endocrine: Negative for cold intolerance and heat intolerance.   Skin:  Negative for nail changes and rash.   Musculoskeletal:  Negative for joint pain, muscle cramps, muscle weakness and myalgias.   Gastrointestinal:  Negative for change in bowel habit, heartburn, hematemesis, hematochezia, hemorrhoids and melena.   Neurological:  Negative for dizziness, focal weakness and headaches.         Physical Exam  Wt Readings from Last 1 Encounters:   06/24/25 91.2 kg (201 lb)     BP Readings from Last 3 Encounters:   06/24/25 133/74   04/02/25 118/70   01/16/25 116/72     Pulse Readings from Last 1 Encounters:   06/24/25 (!) 45     Body mass index is 25.81 kg/m².    Physical Exam  Vitals reviewed.   Constitutional:       Appearance: He is well-developed.   HENT:      Head: Atraumatic.   Eyes:      General: No scleral icterus.  Neck:      Vascular: Normal carotid pulses. No carotid bruit, hepatojugular reflux or JVD.   Cardiovascular:      Rate and Rhythm: Regular rhythm. Bradycardia present.      Chest Wall: PMI is not displaced.      Pulses: Intact distal pulses.           Carotid pulses are 2+ on the right side and 2+ on the left side.       Radial pulses are 2+ on the right side and 2+ on the left side.        Dorsalis pedis pulses are 2+ on the right side and 2+ on the left side.      Heart sounds: S1 normal and S2 normal. Murmur heard.      Harsh midsystolic murmur is present at the upper right sternal border radiating to the neck.      No friction rub.   Pulmonary:      Effort: Pulmonary effort is normal. No respiratory distress.      Breath sounds: Normal breath sounds. No stridor. No wheezing or rales.   Chest:      Chest wall: No tenderness.   Abdominal:      General: Bowel sounds are normal.      Palpations: Abdomen is soft.   Musculoskeletal:      Cervical back: Neck supple. No edema.   Skin:     General: Skin is warm and dry.      Nails: There is no clubbing.   Neurological:       "Mental Status: He is alert and oriented to person, place, and time.   Psychiatric:         Behavior: Behavior normal.         Thought Content: Thought content normal.           Laboratory:  Trended Lab Data:  Recent Labs   Lab 06/24/25  0758   WBC 7.83   HGB 15.0   HCT 42.2          Recent Labs   Lab 06/24/25  0758      K 4.5      CO2 23   BUN 9   *   CALCIUM 8.3*       Recent Labs   Lab 06/24/25  0758   PROT 6.6   ALBUMIN 3.7   BILITOT 0.7   AST 16   ALT 18   ALKPHOS 51       No results for input(s): "PROTIME", "PTT", "INR" in the last 168 hours.    Cardiac:   Recent Labs   Lab 06/24/25  0758   TROPONINI 0.013       FLP:   Lab Results   Component Value Date    CHOL 120 03/18/2024    HDL 43 03/18/2024    LDLCALC 56 03/18/2024    TRIG 124 03/18/2024    CHOLHDL 2.8 03/18/2024     DM:   Lab Results   Component Value Date    HGBA1C 5.0 10/26/2021    LDLCALC 56 03/18/2024    CREATININE 1.0 06/24/2025     Thyroid:   Lab Results   Component Value Date    TSH 0.741 06/24/2025    FREET4 1.13 07/18/2023     Anemia: No results found for: "IRON", "TIBC", "FERRITIN", "[id number]", "FOLATE"  Urinalysis:   Lab Results   Component Value Date    COLORU Yellow 10/29/2020    SPECGRAV 1.020 10/29/2020    NITRITE Negative 10/29/2020    KETONESU Negative 10/29/2020    UROBILINOGEN Negative 10/29/2020     @    Other Results:  EKG (my interpretation):    TELEMETRY:  sinus 2:1 AVB    Echo:   Results for orders placed or performed during the hospital encounter of 06/24/25   Echo   Result Value Ref Range    Triscuspid Valve Regurgitation Peak Gradient 30 mmHg    Left Atrium Major Axis 4.3 cm    Left Atrium Minor Axis 4.1 cm    RA Major Axis 4.68 cm    LV Diastolic Volume 125 mL    LV Systolic Volume 44 mL    PV Peak D Grabiel 0.69 m/s    PV Peak S Grabiel 0.63 m/s    MV stenosis pressure 1/2 time 25.05 ms    TR Max Grabiel 2.7 m/s    MV Peak E Grabiel 1.60 m/s    Mr max grabiel 4.88 m/s    Ao VTI 53.6 cm    Ao peak grabiel 2.5 m/s    LVOT " peak VTI 20.7 cm    LVOT peak milad 0.9 m/s    LVOT diameter 2.2 cm    E wave deceleration time 86 msec    AV mean gradient 14 mmHg    LA size 3.5 cm    LVIDs 3.3 2.1 - 4.0 cm    PW 0.7 0.6 - 1.1 cm    IVS 0.8 0.6 - 1.1 cm    LVIDd 5.1 3.5 - 6.0 cm    PV PEAK VELOCITY 0.87 m/s    TDI LATERAL 0.11 m/s    Left Ventricular Outflow Tract Mean Gradient 1.67 mmHg    Left Ventricular Outflow Tract Mean Velocity 0.63 cm/s    Left Ventricular End Systolic Volume by Teichholz Method 44.15 mL    Left Ventricular End Diastolic Volume by Teichholz Method 125.33 mL    IVC diameter 2.01 cm    TDI SEPTAL 0.13 m/s    LV LATERAL E/E' RATIO 14.5 m/s    LV SEPTAL E/E' RATIO 12.3 m/s    FS 35.3 28 - 44 %    LV mass 129.4 g    ZLVIDD -3.57     ZLVIDS -2.33     Left Ventricle Relative Wall Thickness 0.27 cm    AV valve area 1.5 cm²    AV Velocity Ratio 0.36     AV index (prosthetic) 0.39     MV valve area p 1/2 method 8.78 cm2    PV peak gradient 3 mmHg    Mean e' 0.12 m/s    Pulm vein S/D ratio 0.91     LVOT area 3.8 cm2    LVOT stroke volume 78.6 cm3    AV peak gradient 25 mmHg    E/E' ratio 13 m/s    LV Systolic Volume Index 20.2 mL/m2    LV Diastolic Volume Index 57.34 mL/m2    LV Mass Index 59.4 g/m2    SANJAY by Velocity Ratio 1.4 cm²    BSA 2.18 m2    VERNA 24 mL/m2    LA Vol 52 cm3    LA WIDTH 4.2 cm    RA Width 3.4 cm       Radiology:  X-Ray Chest 1 View  Result Date: 6/24/2025  EXAMINATION: XR CHEST 1 VIEW TECHNIQUE: One view COMPARISON: Comparison is made to 09/29/2020. FINDINGS: Heart size is normal, as is the appearance of the pulmonary vascularity.  Lung zones are clear, and are free of significant airspace consolidation or volume loss.  No pleural fluid.  No hilar or mediastinal mass lesion.  No pneumothorax.     No significant intrathoracic abnormality.  Allowing for differences in projection and inspiratory depth level, there has been no significant detrimental interval change in the appearance of the chest since 09/29/2020.  Electronically signed by: Linwood Toribio MD Date:    06/24/2025 Time:    07:40        Current Medications:     Infusions:       Scheduled:       PRN:        Assessment and Plan:  Symptomatic 2:1 AVB.  Not on any AV daniel blocking medications.  -discussed with Sarmad Ho (ER), Erin (his cardiologist) and Nory (his EP).  Dr. Cueto requests transfer to [facility] for pacemaker evaluation.    Mild-moderate AS on previous echo  -echo pending    NHL s/p surgery, chemo and XRT    Thank you for allowing me to participate in the care of Michael Hart.      Robbie Boggs MD, F.A.C.C, F.S.C.A.I.    Disclaimer: This document was created using voice recognition software ([employer]). Although it may be edited, this document may contain errors related to incorrect recognition of the spoken word. Please call the physician if clarification is needed.          [1]   Patient Active Problem List  Diagnosis    Lymph node enlargement    Nonrheumatic aortic valve stenosis    Hypercholesterolemia    Postablative hypothyroidism    Allergy to iodine    Syncope and collapse    Attention deficit hyperactivity disorder, combined type    SVT (supraventricular tachycardia)   [2]   No current facility-administered medications for this encounter.     Current Outpatient Medications   Medication Sig Dispense Refill    aspirin (ECOTRIN) 81 MG EC tablet Take 1 tablet (81 mg total) by mouth once daily. 30 tablet 0    atorvastatin (LIPITOR) 10 MG tablet Take 1 tablet (10 mg total) by mouth once daily. (Patient taking differently: Take 10 mg by mouth once daily. States currently takes every other day) 90 tablet 3    dextroamphetamine-amphetamine (ADDERALL) 20 mg tablet Take 1 tablet by mouth once daily. 30 tablet 0    ezetimibe (ZETIA) 10 mg tablet Take 1 tablet (10 mg total) by mouth once daily. (Patient not taking: Reported on 12/18/2024) 90 tablet 3    hydrocortisone (ANUSOL-HC) 25 mg suppository Place 25 mg rectally 2 (two) times daily.       hydrocortisone-pramoxine (PROCTOFOAM-HS) rectal foam Place 1 applicator rectally 2 (two) times daily. 20 g 3    levothyroxine (SYNTHROID) 100 MCG tablet TAKE 1 TABLET BY MOUTH EVERY DAY 30 tablet 6   [3]   Social History  Socioeconomic History    Marital status: Single   Tobacco Use    Smoking status: Never    Smokeless tobacco: Never   Substance and Sexual Activity    Alcohol use: Yes     Alcohol/week: 1.0 standard drink of alcohol     Types: 1 Glasses of wine per week     Comment: nightly    Drug use: Never     Social Drivers of Health     Financial Resource Strain: Patient Declined (12/5/2023)    Overall Financial Resource Strain (CARDIA)     Difficulty of Paying Living Expenses: Patient declined   Food Insecurity: Patient Declined (12/5/2023)    Hunger Vital Sign     Worried About Running Out of Food in the Last Year: Patient declined     Ran Out of Food in the Last Year: Patient declined   Transportation Needs: Patient Declined (12/5/2023)    PRAPARE - Transportation     Lack of Transportation (Medical): Patient declined     Lack of Transportation (Non-Medical): Patient declined   Physical Activity: Sufficiently Active (12/5/2023)    Exercise Vital Sign     Days of Exercise per Week: 5 days     Minutes of Exercise per Session: 40 min   Stress: No Stress Concern Present (12/5/2023)    [employer] - Occupational Stress Questionnaire     Feeling of Stress : Not at all   Housing Stability: Unknown (12/5/2023)    Housing Stability Vital Sign     Unable to Pay for Housing in the Last Year: Patient refused     Unstable Housing in the Last Year: Patient refused

## 2025-06-24 NOTE — CHAPLAIN
06/24/25 1626   Clinical Encounter Type   Visit Type Initial Visit   Visit Category Critical Care   Visited With Patient;Health care provider   Length of Visit 30 Minutes   Patient Spiritual Encounters   Care Provided Compassionate presence;Reflective listening;Assessed sahara resources   Patient Coping Accepting;Open/discussion   Comments - Patient patient expresses no spiritual care needs at this time. I informed him of the spiritual care services we provide and he affirmed that he would reach out as need arises

## 2025-06-24 NOTE — ED PROVIDER NOTES
"  Source of History:  Medical record, patient      Chief complaint:  Per triage note: "Shortness of Breath (C/O shortness of breath but only upon exertion/Reports that this has been occurring over the past 2-3 days ) and Rib Pain (Reproducible discomfort noted to ribs on the R side/"I mainly feel it upon movement or when I take a deep breathe."/)  "    HPI:    Patient presents with shortness of breath on exertion and chest discomfort for the past 3 days. 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  He states that at baseline his resting heart rate is in his in the 70s, has been in the 50s even with exertion since onset. He reports occasional, mild pleuritic chest pain and chest discomfort on the R side. He also reports that his blood pressure has been below his baseline for the past few days when he checks it at home - is usually 120s/80s but has been 110s/60s these past few days. He states he drinks about a gallon of water a day, occasionally will add electrolyte packets to his water. He currently denies any palpitations, headaches, fever, N/V, changes to bowel habits.       ROS:   See HPI for pertinent review of systems      Review of patient's allergies indicates:   Allergen Reactions    Ciprofloxacin Hives and Itching    Levaquin [levofloxacin] Swelling     And itching    Iodine and iodide containing products Itching     And IV contrast         PMH:  As per HPI and below:  Past Medical History:   Diagnosis Date    Aortic stenosis     Attention deficit hyperactivity disorder, combined type     Enlarged lymph node 10/2021    Hodgkin lymphoma 1998    Hypothyroidism        Past Surgical History:   Procedure Laterality Date    ABLATION, ATRIAL FLUTTER, TYPICAL N/A 1/16/2025    Procedure: ABLATION, ATRIAL FLUTTER, TYPICAL;  Surgeon: Christian Cueto MD;  Location: [facility] LAB;  Service: Cardiology;  Laterality: N/A;  " "SVT, RFA, CARTO, MAC, GP, 3 Prep    BIOPSY OF INGUINAL LYMPH NODE Right 10/22/2021    Procedure: BIOPSY, LYMPH NODE, INGUINAL;  Surgeon: Trey Lora III, MD;  Location: [facility];  Service: General;  Laterality: Right;  right inguinal lymph node biopsy    HERNIA REPAIR      as a child    RHINOPLASTY      TONSILLECTOMY      turmor removed from chest      also pericardium        Social History[1]    Physical Exam:      Nursing note and vitals reviewed.  /61   Pulse (!) 45   Temp 97.8 °F (36.6 °C) (Oral)   Resp 17   Ht 6' 2" (1.88 m)   Wt 91.2 kg (201 lb)   SpO2 96%   BMI 25.81 kg/m²     Constitutional: No distress.  Eyes: EOMI. No discharge. Anicteric.  HENT:   Neck: Normal range of motion. Neck supple.  Cardiovascular:  Bradycardic rate.  S1, S2 with systolic.   Pulmonary/Chest: No respiratory distress. Effort normal. No wheezes, no rales, no rhonchi.  Sternotomy scar.    Abdominal: Bowel sounds normal. Soft. No distension and no mass. There is no tenderness. There is no rebound, no guarding, no tenderness at McBurney's point.  Neurological: GCS 15. Alert and oriented to person, place, and time. No gross cranial nerve, light touch or strength deficit. Coordination normal.   Skin: Skin is warm and dry.   EXT: 2+ radial pulses.     Medical Decision Making / Independent Interpretations / External Records Reviewed:        Patient is a 45-year-old male with history of supraventricular tachycardia (presented with syncope, regular narrow complex tachycardia induced during exercise stress) s/p ablation in Jan (Dr Cueto), non-Hodgkin's lymphoma s/p chemo/radiation in 1998, hypothyroidism who presents for evaluation  He presents for evaluation of 3 days new dyspnea on exertion, bradycardia, right-sided pleuritic chest pain.   Patient had regular bradycardia and rub on auscultation.    The initial differential included sick sinus syndrome, other chronotropic insufficiency, other dysrhythmia, dehydration, gross " metabolic abnormality, pericarditis, pericardial effusion, pneumothorax, pneumonia.     ED Course as of 06/24/25 1426   Tue Jun 24, 2025   0801   --  EKG Interpretation: Sinus bradycardia rhythm at 45 beats per minute, no STEMI, no significant acute ST/T abnormalities, normal intervals.    No acute change compared to prior tracing.  --       [RC]   0922 Patient history, findings, results, patient management discussed with cardiologist Dr. Boggs.  He is not feel the patient requires transfer for electro physiology at this time.  He feels patient is appropriate to stay here to be admitted to the hospitalist team.  [RC]   0925 Patient has a true medical need for observation/admission.   I have discussed the patient history, exam, findings with hospitalist Dr. Gustafson, who accepts the patient for Dr. Dawson.          [RC]   0941 Patient history, findings, results, patient management discussed with Dr. Boggs  shares concern that patient may require pacemaker.  He notes patient's EKG actually demonstrates 2:1 AV block; I agree upon further review.    [RC]   0956 Patient history, findings, results, patient management discussed with pt's cardiologist Dr. Khan for approx 5 min who adds that the patient has a complex history of aortic stenosis, last had an echo about a year ago which have issue I% ejection fraction.  He notes that the patient may require biventricular pacer depending on his echo findings. Sarmad Boggs and Samir updated.    [RC]   1425 Patient accepted as transfer by EP cardiologist Dr. Cueto.   Patient admitted to hospitalist given expected prolonged time until a bed is available at [facility] (11 pending transfers and [facility] at saturation). [RC]      ED Course User Index  [RC] Sudhir Ho MD              Procedures      Medications - No data to display    --  I decided to obtain the patient's medical records. I reviewed patient's prior external notes / results:  specialist  documentation   and inpatient admission documentation.   --  Additional Medical Decision Making: Prescription drug management, Hospitalization or escalation of care considered, and Decision regarding emergent surgery / procedure (transcutaneous pacing, pacemaker placement)    Pt seen at a time of critical national shortage of IV fluids, affecting decision making and treatment considerations.   Launch MDCalc MDM  MDCalc MDM Module  Jun 24 2025 9:34 AM [Sudhir Ho]  Data:  - Discussed with external professional: Case discussed with Admitting Provider or a provider/trainee on their team and Cardiology. See MDM section and/or ED Course for additional details on the discussion.  - Independent interpretation: I independently reviewed the XR Chest 1V. See MDM section and/or ED Course for my interpretation. [Sudhir Ho]  - Test/documents/historian: 3+ tests ordered  Additional encounter diagnoses: Dyspnea on exertion, Dyspnea, Bradycardia, Symptomatic bradycardia  Risk: Admitted (Decision regarding hospitalization)    =====================================  Critical Care:  40 minutes total critical care time was personally spent by me, exclusive of procedures and separately billable time.   Critical care was necessary to treat or prevent imminent or life-threatening deterioration of the following conditions:  Dysrhythmia, symptomatic bradycardia   =====================================           Future Appointments   Date Time Provider Department Center   7/22/2025 11:00 AM MARCEL S[facility] MD [facility]          Diagnostic Impression:    1. Symptomatic bradycardia    2. Dyspnea on exertion    3. Dyspnea    4. Bradycardia             ED Disposition Condition    Admit                     Sudhir Ho MD  06/24/25 0934       Sudhir Ho MD  06/24/25 0942       Sudhir Ho MD  06/24/25 0950         Sudhir Ho MD  06/24/25 0953         Sudhir Ho MD  06/24/25 0958         [1]    Social History  Tobacco Use    Smoking status: Never    Smokeless tobacco: Never   Substance Use Topics    Alcohol use: Yes     Alcohol/week: 1.0 standard drink of alcohol     Types: 1 Glasses of wine per week     Comment: nightly    Drug use: Never        Sudhir Ho MD  06/24/25 1664

## 2025-06-24 NOTE — ED NOTES
Pt back in bed from ECHO. Currently AAOx4, in NAD. Pt HR currently at 43, denies dizziness or weakness. No signs of diaphoresis or pallor to the skin.

## 2025-06-24 NOTE — HPI
"Michael Hart is a 45year old male with a past medical history of hypothyroidism, NHL (s/p chemo and XRT to mediastinum in 1998), aortic stenosis (mild-moderate), hypothyroidism, s/p ablation for atrial flutter (Jan 2025 with Dr Cueto)(had SVT on stress test) who presents with shortness of breath and chest discomfort for the past three days.   He states that he has been checking his heart rate with his blood pressure machine at home and it registered as low as 20. 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  He states that at baseline his resting heart rate is in his in the 70s, has been in the 50s even with exertion since onset. He reports occasional, mild pleuritic chest pain and chest discomfort on the R side. He also reports that his blood pressure has been below his baseline for the past few days when he checks it at home - is usually 120s/80s but has been 110s/60s these past few days. He states he drinks about a gallon of water a day, occasionally will add electrolyte packets to his water. He is followed by Dr Paul at [facility] and was found to have moderate aortic stenosis and insufficiency on his last echocardiogram approximately a year ago.     Upon arrival to the ED, patient's HR 40s and blood pressure 119/70. EKG showed sinus bradycardia.  CBC unremarkable, CMP unremarkable.  Initial troponin within normal limits.  TSH 0.741.  Repeat TTE performed showed systolic function with a visually estimated ejection fraction of 55 - 70%. Global longitudinal strain is reduced; 13.3%.  Cardiology consulted in the ED and patient accepted for transfer at [facility] to have pacemaker placed. He was referred to hospital medicine for capacity and will transfer pending bed availability.  Patient will be accepted for further evaluation and management.   "

## 2025-06-25 ENCOUNTER — ANESTHESIA EVENT (OUTPATIENT)
Dept: MEDSURG UNIT | Facility: HOSPITAL | Age: 45
DRG: 244 | End: 2025-06-25
Payer: MEDICAID

## 2025-06-25 PROBLEM — I48.92 ATRIAL FLUTTER: Status: RESOLVED | Noted: 2025-06-24 | Resolved: 2025-06-25

## 2025-06-25 PROBLEM — I44.2 COMPLETE HEART BLOCK: Status: ACTIVE | Noted: 2025-06-24

## 2025-06-25 PROBLEM — I47.10 SVT (SUPRAVENTRICULAR TACHYCARDIA): Status: RESOLVED | Noted: 2024-09-24 | Resolved: 2025-06-25

## 2025-06-25 PROBLEM — I45.9 HEART BLOCK: Status: RESOLVED | Noted: 2025-06-24 | Resolved: 2025-06-25

## 2025-06-25 LAB
ABORH RETYPE: NORMAL
ABSOLUTE EOSINOPHIL (OHS): 0.11 K/UL
ABSOLUTE EOSINOPHIL (OHS): 0.27 K/UL
ABSOLUTE MONOCYTE (OHS): 0.48 K/UL (ref 0.3–1)
ABSOLUTE MONOCYTE (OHS): 0.54 K/UL (ref 0.3–1)
ABSOLUTE NEUTROPHIL COUNT (OHS): 6.77 K/UL (ref 1.8–7.7)
ABSOLUTE NEUTROPHIL COUNT (OHS): 9.12 K/UL (ref 1.8–7.7)
ALBUMIN SERPL BCP-MCNC: 3.5 G/DL (ref 3.5–5.2)
ALP SERPL-CCNC: 56 UNIT/L (ref 40–150)
ALT SERPL W/O P-5'-P-CCNC: 15 UNIT/L (ref 10–44)
ANION GAP (OHS): 8 MMOL/L (ref 8–16)
APTT PPP: 29.1 SECONDS (ref 21–32)
AST SERPL-CCNC: 12 UNIT/L (ref 11–45)
BASOPHILS # BLD AUTO: 0.04 K/UL
BASOPHILS # BLD AUTO: 0.05 K/UL
BASOPHILS NFR BLD AUTO: 0.4 %
BASOPHILS NFR BLD AUTO: 0.5 %
BILIRUB SERPL-MCNC: 0.6 MG/DL (ref 0.1–1)
BUN SERPL-MCNC: 12 MG/DL (ref 6–20)
CALCIUM SERPL-MCNC: 8.1 MG/DL (ref 8.7–10.5)
CHLORIDE SERPL-SCNC: 107 MMOL/L (ref 95–110)
CO2 SERPL-SCNC: 22 MMOL/L (ref 23–29)
CREAT SERPL-MCNC: 0.9 MG/DL (ref 0.5–1.4)
CRP SERPL-MCNC: 11.23 MG/L
ERYTHROCYTE [DISTWIDTH] IN BLOOD BY AUTOMATED COUNT: 11.9 % (ref 11.5–14.5)
ERYTHROCYTE [DISTWIDTH] IN BLOOD BY AUTOMATED COUNT: 12 % (ref 11.5–14.5)
ERYTHROCYTE [SEDIMENTATION RATE] IN BLOOD BY PHOTOMETRIC METHOD: 5 MM/HR
GFR SERPLBLD CREATININE-BSD FMLA CKD-EPI: >60 ML/MIN/1.73/M2
GLUCOSE SERPL-MCNC: 90 MG/DL (ref 70–110)
HCT VFR BLD AUTO: 41.2 % (ref 40–54)
HCT VFR BLD AUTO: 41.3 % (ref 40–54)
HGB BLD-MCNC: 14 GM/DL (ref 14–18)
HGB BLD-MCNC: 14.2 GM/DL (ref 14–18)
IMM GRANULOCYTES # BLD AUTO: 0.02 K/UL (ref 0–0.04)
IMM GRANULOCYTES # BLD AUTO: 0.03 K/UL (ref 0–0.04)
IMM GRANULOCYTES NFR BLD AUTO: 0.2 % (ref 0–0.5)
IMM GRANULOCYTES NFR BLD AUTO: 0.3 % (ref 0–0.5)
INDIRECT COOMBS: NORMAL
INR PPP: 0.9 (ref 0.8–1.2)
LYMPHOCYTES # BLD AUTO: 1.11 K/UL (ref 1–4.8)
LYMPHOCYTES # BLD AUTO: 1.55 K/UL (ref 1–4.8)
MAGNESIUM SERPL-MCNC: 2 MG/DL (ref 1.6–2.6)
MCH RBC QN AUTO: 30.2 PG (ref 27–31)
MCH RBC QN AUTO: 30.5 PG (ref 27–31)
MCHC RBC AUTO-ENTMCNC: 34 G/DL (ref 32–36)
MCHC RBC AUTO-ENTMCNC: 34.4 G/DL (ref 32–36)
MCV RBC AUTO: 89 FL (ref 82–98)
MCV RBC AUTO: 89 FL (ref 82–98)
NUCLEATED RBC (/100WBC) (OHS): 0 /100 WBC
NUCLEATED RBC (/100WBC) (OHS): 0 /100 WBC
OHS QRS DURATION: 90 MS
OHS QRS DURATION: 90 MS
OHS QTC CALCULATION: 387 MS
OHS QTC CALCULATION: 442 MS
PLATELET # BLD AUTO: 168 K/UL (ref 150–450)
PLATELET # BLD AUTO: 179 K/UL (ref 150–450)
PMV BLD AUTO: 10.5 FL (ref 9.2–12.9)
PMV BLD AUTO: 10.6 FL (ref 9.2–12.9)
POCT GLUCOSE: 93 MG/DL (ref 70–110)
POTASSIUM SERPL-SCNC: 4.5 MMOL/L (ref 3.5–5.1)
PROT SERPL-MCNC: 5.9 GM/DL (ref 6–8.4)
PROTHROMBIN TIME: 10.4 SECONDS (ref 9–12.5)
RBC # BLD AUTO: 4.63 M/UL (ref 4.6–6.2)
RBC # BLD AUTO: 4.65 M/UL (ref 4.6–6.2)
RELATIVE EOSINOPHIL (OHS): 1 %
RELATIVE EOSINOPHIL (OHS): 2.9 %
RELATIVE LYMPHOCYTE (OHS): 10.2 % (ref 18–48)
RELATIVE LYMPHOCYTE (OHS): 16.8 % (ref 18–48)
RELATIVE MONOCYTE (OHS): 4.4 % (ref 4–15)
RELATIVE MONOCYTE (OHS): 5.9 % (ref 4–15)
RELATIVE NEUTROPHIL (OHS): 73.7 % (ref 38–73)
RELATIVE NEUTROPHIL (OHS): 83.7 % (ref 38–73)
RH BLD: NORMAL
SODIUM SERPL-SCNC: 137 MMOL/L (ref 136–145)
SPECIMEN OUTDATE: NORMAL
WBC # BLD AUTO: 10.89 K/UL (ref 3.9–12.7)
WBC # BLD AUTO: 9.2 K/UL (ref 3.9–12.7)

## 2025-06-25 PROCEDURE — 25000003 PHARM REV CODE 250: Performed by: NURSE PRACTITIONER

## 2025-06-25 PROCEDURE — 25000003 PHARM REV CODE 250: Performed by: INTERNAL MEDICINE

## 2025-06-25 PROCEDURE — 86141 C-REACTIVE PROTEIN HS: CPT | Performed by: STUDENT IN AN ORGANIZED HEALTH CARE EDUCATION/TRAINING PROGRAM

## 2025-06-25 PROCEDURE — 25000003 PHARM REV CODE 250

## 2025-06-25 PROCEDURE — 85610 PROTHROMBIN TIME: CPT | Performed by: STUDENT IN AN ORGANIZED HEALTH CARE EDUCATION/TRAINING PROGRAM

## 2025-06-25 PROCEDURE — 99222 1ST HOSP IP/OBS MODERATE 55: CPT | Mod: ,,, | Performed by: INTERNAL MEDICINE

## 2025-06-25 PROCEDURE — A9585 GADOBUTROL INJECTION: HCPCS | Performed by: INTERNAL MEDICINE

## 2025-06-25 PROCEDURE — 86850 RBC ANTIBODY SCREEN: CPT | Performed by: STUDENT IN AN ORGANIZED HEALTH CARE EDUCATION/TRAINING PROGRAM

## 2025-06-25 PROCEDURE — 83735 ASSAY OF MAGNESIUM: CPT | Performed by: NURSE PRACTITIONER

## 2025-06-25 PROCEDURE — 93005 ELECTROCARDIOGRAM TRACING: CPT

## 2025-06-25 PROCEDURE — 20000000 HC ICU ROOM

## 2025-06-25 PROCEDURE — 99223 1ST HOSP IP/OBS HIGH 75: CPT | Mod: ,,, | Performed by: INTERNAL MEDICINE

## 2025-06-25 PROCEDURE — 94761 N-INVAS EAR/PLS OXIMETRY MLT: CPT

## 2025-06-25 PROCEDURE — 80053 COMPREHEN METABOLIC PANEL: CPT | Performed by: NURSE PRACTITIONER

## 2025-06-25 PROCEDURE — 85730 THROMBOPLASTIN TIME PARTIAL: CPT | Performed by: STUDENT IN AN ORGANIZED HEALTH CARE EDUCATION/TRAINING PROGRAM

## 2025-06-25 PROCEDURE — 63600175 PHARM REV CODE 636 W HCPCS

## 2025-06-25 PROCEDURE — 85652 RBC SED RATE AUTOMATED: CPT | Performed by: STUDENT IN AN ORGANIZED HEALTH CARE EDUCATION/TRAINING PROGRAM

## 2025-06-25 PROCEDURE — 85025 COMPLETE CBC W/AUTO DIFF WBC: CPT | Performed by: NURSE PRACTITIONER

## 2025-06-25 PROCEDURE — 25500020 PHARM REV CODE 255: Performed by: INTERNAL MEDICINE

## 2025-06-25 PROCEDURE — 25000003 PHARM REV CODE 250: Performed by: STUDENT IN AN ORGANIZED HEALTH CARE EDUCATION/TRAINING PROGRAM

## 2025-06-25 PROCEDURE — 93010 ELECTROCARDIOGRAM REPORT: CPT | Mod: ,,, | Performed by: INTERNAL MEDICINE

## 2025-06-25 RX ORDER — FAMOTIDINE 20 MG/1
20 TABLET, FILM COATED ORAL ONCE
Status: COMPLETED | OUTPATIENT
Start: 2025-06-26 | End: 2025-06-26

## 2025-06-25 RX ORDER — FAMOTIDINE 10 MG/ML
20 INJECTION, SOLUTION INTRAVENOUS ONCE
Status: DISCONTINUED | OUTPATIENT
Start: 2025-06-25 | End: 2025-06-25

## 2025-06-25 RX ORDER — FAMOTIDINE 20 MG/1
20 TABLET, FILM COATED ORAL ONCE
Status: COMPLETED | OUTPATIENT
Start: 2025-06-25 | End: 2025-06-25

## 2025-06-25 RX ORDER — DIPHENHYDRAMINE HCL 25 MG
50 CAPSULE ORAL ONCE
Status: DISCONTINUED | OUTPATIENT
Start: 2025-06-26 | End: 2025-06-26

## 2025-06-25 RX ORDER — CEFAZOLIN 2 G/1
2 INJECTION, POWDER, FOR SOLUTION INTRAMUSCULAR; INTRAVENOUS
OUTPATIENT
Start: 2025-06-25

## 2025-06-25 RX ORDER — DIPHENHYDRAMINE HCL 25 MG
50 CAPSULE ORAL ONCE
Status: COMPLETED | OUTPATIENT
Start: 2025-06-25 | End: 2025-06-25

## 2025-06-25 RX ORDER — GADOBUTROL 604.72 MG/ML
13 INJECTION INTRAVENOUS
Status: COMPLETED | OUTPATIENT
Start: 2025-06-25 | End: 2025-06-25

## 2025-06-25 RX ORDER — FAMOTIDINE 10 MG/ML
20 INJECTION, SOLUTION INTRAVENOUS ONCE
Status: DISCONTINUED | OUTPATIENT
Start: 2025-06-26 | End: 2025-06-25

## 2025-06-25 RX ORDER — FAMOTIDINE 20 MG/1
20 TABLET, FILM COATED ORAL ONCE
Status: DISCONTINUED | OUTPATIENT
Start: 2025-06-26 | End: 2025-06-26

## 2025-06-25 RX ORDER — DIPHENHYDRAMINE HCL 25 MG
50 CAPSULE ORAL ONCE
Status: COMPLETED | OUTPATIENT
Start: 2025-06-26 | End: 2025-06-26

## 2025-06-25 RX ADMIN — PREDNISONE 50 MG: 5 TABLET ORAL at 09:06

## 2025-06-25 RX ADMIN — LEVOTHYROXINE SODIUM 100 MCG: 100 TABLET ORAL at 06:06

## 2025-06-25 RX ADMIN — ATORVASTATIN CALCIUM 10 MG: 10 TABLET, FILM COATED ORAL at 08:06

## 2025-06-25 RX ADMIN — GADOBUTROL 13 ML: 604.72 INJECTION INTRAVENOUS at 04:06

## 2025-06-25 RX ADMIN — IBUPROFEN 600 MG: 600 TABLET ORAL at 09:06

## 2025-06-25 RX ADMIN — IBUPROFEN 600 MG: 600 TABLET ORAL at 08:06

## 2025-06-25 RX ADMIN — DIPHENHYDRAMINE HYDROCHLORIDE 50 MG: 25 CAPSULE ORAL at 09:06

## 2025-06-25 RX ADMIN — FAMOTIDINE 20 MG: 20 TABLET, FILM COATED ORAL at 09:06

## 2025-06-25 RX ADMIN — PANTOPRAZOLE SODIUM 40 MG: 40 TABLET, DELAYED RELEASE ORAL at 08:06

## 2025-06-25 NOTE — EICU
Intervention Initiated From:  COR / DERIANU    Madelin intervened regarding:  Rounding (Video assessment)  VICU Night Rounds Checklist  24H Vital Sign Range:  Temp:  [97.8 °F (36.6 °C)-98.7 °F (37.1 °C)]   Pulse:  [42-51]   Resp:  [16-34]   BP: (109-153)/(55-78)   SpO2:  [94 %-99 %]     Video rounds

## 2025-06-25 NOTE — NURSING
Patient arrived from  [facility] made aware of arrival. Patient alert and oriented. No pain, no sign of distress. IV line in place - saline locked. Tele monitor started . On room air. Call bell given on his reach.Urinal onhis reach.  Bed on lowest position. Bed alarm on. Safety maintained.

## 2025-06-25 NOTE — EICU
"Virtual ICU Admission    Admit Date: 2025  LOS: 1  Code Status: Full Code   : 1980  Bed: 24954 TCVICU/30464 TCVIC*:     Diagnosis: Complete heart block    Patient  has a past medical history of Aortic stenosis, Atrial flutter, Attention deficit hyperactivity disorder, combined type, Enlarged lymph node, Hodgkin lymphoma, Hypothyroidism, and SVT (supraventricular tachycardia).    Last VS: BP (!) 116/59   Pulse (!) 46   Temp 98.5 °F (36.9 °C) (Oral)   Resp 20   Ht 6' 2" (1.88 m)   Wt 92.1 kg (203 lb 0.7 oz)   SpO2 (!) 94%   BMI 26.07 kg/m²       VICU Review    VICU nurse assessment :  [place] completed, LDA documentation reconciliation completed, and VTE prophylaxis review                  "

## 2025-06-25 NOTE — PLAN OF CARE
SICU PLAN OF CARE    Dx: Complete heart block    Vital Signs (last 12 hours):   Temp:  [98 °F (36.7 °C)-98.1 °F (36.7 °C)]   Pulse:  [43-77]   Resp:  [10-31]   BP: ()/(55-72)   SpO2:  [94 %-99 %]      Neuro: AAOx4, Follows commands , and Moves all extremities spontaneously     Cardiac: Rhythm: sinus bradycardia, 2:1 AV Block    Respiratory: Room Air    Urine Output: Voids Spontaneously  500 mL/shift    Diet: Regular     Skin:  Skin intact, no lesions noted.  Patient turned q2h, bony prominences protected, and mattress inflated/working correctly.     Shift Events:  No acute events for shift. Cardiac MRI. NPO at midnight for pacemaker placement tomorrow. See flowsheet for further assessment/details.  Patient and family updated on current condition/plan of care, questions answered, and emotional support provided.  MD updated on current condition, vitals, labs, and gtts.

## 2025-06-25 NOTE — H&P
"  [facility] Intensive [facility] Medicine  History & Physical    Patient Name: Michael Hart  MRN: [id number]  Patient Class: IP- Inpatient  Admission Date: 6/24/2025  Attending Physician: Linwood Dawson MD   Primary Care Provider: Brittany Neves MD         Patient information was obtained from patient, past medical records, and ER records.     Subjective:     Principal Problem:Symptomatic bradycardia    Chief Complaint:   Chief Complaint   Patient presents with    Shortness of Breath     C/O shortness of breath but only upon exertion  Reports that this has been occurring over the past 2-3 days     Rib Pain     Reproducible discomfort noted to ribs on the R side  "I mainly feel it upon movement or when I take a deep breathe."          HPI: Michael Hart is a 45year old male with a past medical history of hypothyroidism, NHL (s/p chemo and XRT to mediastinum in 1998), aortic stenosis (mild-moderate), hypothyroidism, s/p ablation for atrial flutter (Jan 2025 with Dr Cueto)(had SVT on stress test) who presents with shortness of breath and chest discomfort for the past three days.   He states that he has been checking his heart rate with his blood pressure machine at home and it registered as low as 20. 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  He states that at baseline his resting heart rate is in his in the 70s, has been in the 50s even with exertion since onset. He reports occasional, mild pleuritic chest pain and chest discomfort on the R side. He also reports that his blood pressure has been below his baseline for the past few days when he checks it at home - is usually 120s/80s but has been 110s/60s these past few days. He states he drinks about a gallon of water a day, occasionally will add electrolyte packets to his water. He is followed by Dr Paul at [facility] and was found to have " moderate aortic stenosis and insufficiency on his last echocardiogram approximately a year ago.     Upon arrival to the ED, patient's HR 40s and blood pressure 119/70. EKG showed sinus bradycardia.  CBC unremarkable, CMP unremarkable.  Initial troponin within normal limits.  TSH 0.741.  Repeat TTE performed showed systolic function with a visually estimated ejection fraction of 55 - 70%. Global longitudinal strain is reduced; 13.3%.  Cardiology consulted in the ED and patient accepted for transfer at [facility] to have pacemaker placed. He was referred to hospital medicine for capacity and will transfer pending bed availability.  Patient will be accepted for further evaluation and management.     Past Medical History:   Diagnosis Date    Aortic stenosis     Attention deficit hyperactivity disorder, combined type     Enlarged lymph node 10/2021    Hodgkin lymphoma 1998    Hypothyroidism        Past Surgical History:   Procedure Laterality Date    ABLATION, ATRIAL FLUTTER, TYPICAL N/A 1/16/2025    Procedure: ABLATION, ATRIAL FLUTTER, TYPICAL;  Surgeon: Christian Cueto MD;  Location: [facility] EP LAB;  Service: Cardiology;  Laterality: N/A;  SVT, RFA, CARTO, MAC, GP, 3 Prep    BIOPSY OF INGUINAL LYMPH NODE Right 10/22/2021    Procedure: BIOPSY, LYMPH NODE, INGUINAL;  Surgeon: Trey Lora III, MD;  Location: [facility] OR;  Service: General;  Laterality: Right;  right inguinal lymph node biopsy    HERNIA REPAIR      as a child    RHINOPLASTY      TONSILLECTOMY      turmor removed from chest      also pericardium        Review of patient's allergies indicates:   Allergen Reactions    Ciprofloxacin Hives and Itching    Levaquin [levofloxacin] Swelling     And itching    Iodine and iodide containing products Itching     And IV contrast         No current facility-administered medications on file prior to encounter.     Current Outpatient Medications on File Prior to Encounter   Medication Sig    dextroamphetamine-amphetamine  (ADDERALL) 20 mg tablet Take 1 tablet by mouth once daily.    levothyroxine (SYNTHROID) 100 MCG tablet TAKE 1 TABLET BY MOUTH EVERY DAY    aspirin (ECOTRIN) 81 MG EC tablet Take 1 tablet (81 mg total) by mouth once daily.    atorvastatin (LIPITOR) 10 MG tablet Take 1 tablet (10 mg total) by mouth once daily. (Patient taking differently: Take 10 mg by mouth once daily. States currently takes every other day)    ezetimibe (ZETIA) 10 mg tablet Take 1 tablet (10 mg total) by mouth once daily. (Patient not taking: Reported on 12/18/2024)    hydrocortisone-pramoxine (PROCTOFOAM-HS) rectal foam Place 1 applicator rectally 2 (two) times daily.    [DISCONTINUED] hydrocortisone (ANUSOL-HC) 25 mg suppository Place 25 mg rectally 2 (two) times daily.     Family History       Problem Relation (Age of Onset)    Heart disease Mother    No Known Problems Father, Sister, Brother          Tobacco Use    Smoking status: Never    Smokeless tobacco: Never   Substance and Sexual Activity    Alcohol use: Yes     Alcohol/week: 1.0 standard drink of alcohol     Types: 1 Glasses of wine per week     Comment: nightly    Drug use: Never    Sexual activity: Not on file     Review of Systems   Constitutional:  Negative for activity change, appetite change, chills and fever.   HENT:  Negative for congestion, sore throat and trouble swallowing.    Eyes:  Negative for photophobia and visual disturbance.   Respiratory:  Positive for shortness of breath. Negative for cough and chest tightness.    Cardiovascular:  Positive for palpitations. Negative for leg swelling.   Gastrointestinal:  Negative for abdominal pain, diarrhea and nausea.   Genitourinary:  Negative for dysuria, flank pain and hematuria.   Musculoskeletal:  Negative for back pain.   Neurological:  Negative for dizziness, weakness and headaches.   Psychiatric/Behavioral:  Negative for confusion.      Objective:     Vital Signs (Most Recent):  Temp: 98.6 °F (37 °C) (06/24/25 1550)  Pulse:  (!) 48 (06/24/25 1845)  Resp: (!) 32 (06/24/25 1845)  BP: 127/62 (06/24/25 1845)  SpO2: 97 % (06/24/25 1845) Vital Signs (24h Range):  Temp:  [97.8 °F (36.6 °C)-98.6 °F (37 °C)] 98.6 °F (37 °C)  Pulse:  [42-51] 48  Resp:  [16-34] 32  SpO2:  [94 %-99 %] 97 %  BP: (109-153)/(55-78) 127/62     Weight: 91.2 kg (201 lb)  Body mass index is 25.81 kg/m².     Physical Exam  Vitals reviewed.   Constitutional:       Appearance: Normal appearance. He is normal weight.   HENT:      Head: Normocephalic.      Mouth/Throat:      Mouth: Mucous membranes are moist.      Pharynx: Oropharynx is clear.   Eyes:      General: Lids are normal. Gaze aligned appropriately.      Conjunctiva/sclera: Conjunctivae normal.   Cardiovascular:      Rate and Rhythm: Regular rhythm. Bradycardia present.      Pulses: Normal pulses.      Heart sounds: Murmur heard.   Pulmonary:      Effort: Pulmonary effort is normal.      Breath sounds: Normal breath sounds.   Abdominal:      General: Bowel sounds are normal.      Palpations: Abdomen is soft.   Musculoskeletal:         General: Normal range of motion.      Cervical back: Normal range of motion.   Skin:     General: Skin is warm and dry.   Neurological:      Mental Status: He is alert and oriented to person, place, and time. Mental status is at baseline.   Psychiatric:         Mood and Affect: Mood normal.                Significant Labs: All pertinent labs within the past 24 hours have been reviewed.  CBC:   Recent Labs   Lab 06/24/25  0758   WBC 7.83   HGB 15.0   HCT 42.2        CMP:   Recent Labs   Lab 06/24/25  0758      K 4.5      CO2 23   *   BUN 9   CREATININE 1.0   CALCIUM 8.3*   PROT 6.6   ALBUMIN 3.7   BILITOT 0.7   ALKPHOS 51   AST 16   ALT 18   ANIONGAP 8       Significant Imaging: I have reviewed all pertinent imaging results/findings within the past 24 hours.  Imaging Results              X-Ray Chest 1 View (Final result)  Result time 06/24/25 07:40:07      Final  result by Linwood Toribio MD (06/24/25 07:40:07)                   Impression:      No significant intrathoracic abnormality.  Allowing for differences in projection and inspiratory depth level, there has been no significant detrimental interval change in the appearance of the chest since 09/29/2020.      Electronically signed by: Linwood Toribio MD  Date:    06/24/2025  Time:    07:40               Narrative:    EXAMINATION:  XR CHEST 1 VIEW    TECHNIQUE:  One view    COMPARISON:  Comparison is made to 09/29/2020.    FINDINGS:  Heart size is normal, as is the appearance of the pulmonary vascularity.  Lung zones are clear, and are free of significant airspace consolidation or volume loss.  No pleural fluid.  No hilar or mediastinal mass lesion.  No pneumothorax.                                      Assessment/Plan:     Assessment & Plan  Symptomatic bradycardia  Mild-moderate aortic stenosis on prior echo    Patient presenting with shortness of breath. Mild chest pain and HR in 50s at home. States resting HR is usually 70s. Patient found to have HR 40s while in the ED. Repeat TTE showed normal systolic function with a visually estimated ejection fraction of 55 - 70%. Global longitudinal strain is reduced; 13.3%.  Patient is followed by Dr Wise, cardiology at [facility]. Cardiology, Dr Boggs, consulted and case discussed with Dr Cueto (EP) and Dr Wise (cardiology)    -Dr. Cueto requests transfer to [facility] for pacemaker evaluation.  -continue to follow recommendations of cardiology  -monitor on telemetry         Attention deficit hyperactivity disorder, combined type  History noted    Prescribed Adderall    VTE Risk Mitigation (From admission, onward)           Ordered     IP VTE LOW RISK PATIENT  Once         06/24/25 4055                       Katty Read NP  Department of Hospital Medicine  [facility] Intensive [facility] ([place])

## 2025-06-25 NOTE — NURSING TRANSFER
Nursing Transfer Note      6/24/2025   9:55 PM    Nurse giving handoff: BRODERICK Tolliver  Nurse receiving handoff:BRODERICK Bobby    Reason patient is being transferred: HLOC    Transfer From: [facility] ICU 02 to [facility] Room 355    Transfer via ambulance stretcher    Transfer with cardiac monitoring and oxygen    Transported by [facility]    Transfer Vital Signs:  Blood Pressure:110/57  Heart Rate:49  O2:95% on RA  Temperature:98.7  Respirations:18      Additional Lines: Oxygen    Medicines sent: none    Any special needs or follow-up needed: none    Patient belongings transferred with patient: Yes    Chart send with patient: Yes    Notified: family at bedside    Patient reassessed at: 6/24/24 1183

## 2025-06-25 NOTE — SUBJECTIVE & OBJECTIVE
Past Medical History:   Diagnosis Date    Aortic stenosis     Attention deficit hyperactivity disorder, combined type     Enlarged lymph node 10/2021    Hodgkin lymphoma 1998    Hypothyroidism        Past Surgical History:   Procedure Laterality Date    ABLATION, ATRIAL FLUTTER, TYPICAL N/A 1/16/2025    Procedure: ABLATION, ATRIAL FLUTTER, TYPICAL;  Surgeon: Christian Cueto MD;  Location: [facility] EP LAB;  Service: Cardiology;  Laterality: N/A;  SVT, RFA, CARTO, MAC, GP, 3 Prep    BIOPSY OF INGUINAL LYMPH NODE Right 10/22/2021    Procedure: BIOPSY, LYMPH NODE, INGUINAL;  Surgeon: Trey Lora III, MD;  Location: [facility] OR;  Service: General;  Laterality: Right;  right inguinal lymph node biopsy    HERNIA REPAIR      as a child    RHINOPLASTY      TONSILLECTOMY      turmor removed from chest      also pericardium        Review of patient's allergies indicates:   Allergen Reactions    Ciprofloxacin Hives and Itching    Levaquin [levofloxacin] Swelling     And itching    Iodine and iodide containing products Itching     And IV contrast         No current facility-administered medications on file prior to encounter.     Current Outpatient Medications on File Prior to Encounter   Medication Sig    dextroamphetamine-amphetamine (ADDERALL) 20 mg tablet Take 1 tablet by mouth once daily.    levothyroxine (SYNTHROID) 100 MCG tablet TAKE 1 TABLET BY MOUTH EVERY DAY    aspirin (ECOTRIN) 81 MG EC tablet Take 1 tablet (81 mg total) by mouth once daily.    atorvastatin (LIPITOR) 10 MG tablet Take 1 tablet (10 mg total) by mouth once daily. (Patient taking differently: Take 10 mg by mouth once daily. States currently takes every other day)    ezetimibe (ZETIA) 10 mg tablet Take 1 tablet (10 mg total) by mouth once daily. (Patient not taking: Reported on 12/18/2024)    hydrocortisone-pramoxine (PROCTOFOAM-HS) rectal foam Place 1 applicator rectally 2 (two) times daily.     Family History       Problem Relation (Age of Onset)     Heart disease Mother    No Known Problems Father, Sister, Brother          Tobacco Use    Smoking status: Never    Smokeless tobacco: Never   Substance and Sexual Activity    Alcohol use: Yes     Alcohol/week: 1.0 standard drink of alcohol     Types: 1 Glasses of wine per week     Comment: nightly    Drug use: Never    Sexual activity: Not on file     Review of Systems   Constitutional: Negative for chills, fever and malaise/fatigue.   Cardiovascular:  Positive for chest pain and dyspnea on exertion. Negative for irregular heartbeat, leg swelling, near-syncope, palpitations and syncope.   Respiratory:  Negative for cough and shortness of breath.      Objective:     Vital Signs (Most Recent):  Temp: 98.3 °F (36.8 °C) (06/24/25 2230)  Pulse: (!) 46 (06/25/25 0043)  Resp: 20 (06/25/25 0043)  BP: (!) 116/59 (06/25/25 0043)  SpO2: (!) 94 % (06/25/25 0043) Vital Signs (24h Range):  Temp:  [97.8 °F (36.6 °C)-98.7 °F (37.1 °C)] 98.3 °F (36.8 °C)  Pulse:  [42-52] 46  Resp:  [16-34] 20  SpO2:  [94 %-99 %] 94 %  BP: (109-153)/(55-78) 116/59     Weight: 96.8 kg (213 lb 6.5 oz)  Body mass index is 27.4 kg/m².    SpO2: (!) 94 %         Intake/Output Summary (Last 24 hours) at 6/25/2025 0045  Last data filed at 6/24/2025 2307  Gross per 24 hour   Intake 100 ml   Output --   Net 100 ml       Lines/Drains/Airways       Peripheral Intravenous Line  Duration             Peripheral IV 06/24/25 0804 20 G Right Antecubital <1 day    Peripheral IV 06/24/25 1515 20 G 1 1/4 in Anterior;Distal;Left Antecubital <1 day                     Physical Exam  Vitals reviewed.   Constitutional:       General: He is not in acute distress.  Eyes:      General: No scleral icterus.  Cardiovascular:      Rate and Rhythm: Regular rhythm. Bradycardia present.      Pulses: Normal pulses.      Heart sounds: Murmur heard.   Pulmonary:      Effort: Pulmonary effort is normal. No respiratory distress.      Breath sounds: No wheezing.   Abdominal:      General:  "There is no distension.      Palpations: Abdomen is soft.   Musculoskeletal:      Right lower leg: No edema.      Left lower leg: No edema.   Skin:     General: Skin is warm and dry.   Neurological:      Mental Status: He is alert and oriented to person, place, and time.   Psychiatric:         Mood and Affect: Mood normal.         Thought Content: Thought content normal.         Judgment: Judgment normal.          Significant Labs: ABG: No results for input(s): "PH", "PCO2", "HCO3", "POCSATURATED", "BE" in the last 48 hours., CMP   Recent Labs   Lab 06/24/25  0758      K 4.5      CO2 23   *   BUN 9   CREATININE 1.0   CALCIUM 8.3*   PROT 6.6   ALBUMIN 3.7   BILITOT 0.7   ALKPHOS 51   AST 16   ALT 18   ANIONGAP 8   , CBC   Recent Labs   Lab 06/24/25  0758 06/24/25  2337   WBC 7.83 10.89   HGB 15.0 14.2   HCT 42.2 41.3    179   , INR No results for input(s): "INR", "PROTIME" in the last 48 hours., Lipid Panel No results for input(s): "CHOL", "HDL", "LDLCALC", "TRIG", "CHOLHDL" in the last 48 hours., Troponin No results for input(s): "TROPONINIHS" in the last 48 hours., and All pertinent lab results from the last 24 hours have been reviewed.    Significant Imaging: Echocardiogram: Transthoracic echo (TTE) complete (Cupid Only):   Results for orders placed or performed during the hospital encounter of 06/24/25   Echo   Result Value Ref Range    Triscuspid Valve Regurgitation Peak Gradient 30 mmHg    Left Atrium Major Axis 4.3 cm    Left Atrium Minor Axis 4.1 cm    RA Major Axis 4.68 cm    LV Diastolic Volume 125 mL    LV Systolic Volume 44 mL    PV Peak D Grabiel 0.69 m/s    PV Peak S Grabiel 0.63 m/s    MV stenosis pressure 1/2 time 25.05 ms    TR Max Grabiel 2.7 m/s    MV Peak E Grabiel 1.60 m/s    Mr max grabiel 4.88 m/s    Ao VTI 53.6 cm    Ao peak grabiel 2.5 m/s    LVOT peak VTI 20.7 cm    LVOT peak grabiel 0.9 m/s    LVOT diameter 2.2 cm    E wave deceleration time 86 msec    AV mean gradient 14 mmHg    LA size 3.5 " cm    LVIDs 3.3 2.1 - 4.0 cm    PW 0.7 0.6 - 1.1 cm    IVS 0.8 0.6 - 1.1 cm    LVIDd 5.1 3.5 - 6.0 cm    PV PEAK VELOCITY 0.87 m/s    TDI LATERAL 0.11 m/s    Left Ventricular Outflow Tract Mean Gradient 1.67 mmHg    Left Ventricular Outflow Tract Mean Velocity 0.63 cm/s    Left Ventricular End Systolic Volume by Teichholz Method 44.15 mL    Left Ventricular End Diastolic Volume by Teichholz Method 125.33 mL    IVC diameter 2.01 cm    TDI SEPTAL 0.13 m/s    LV LATERAL E/E' RATIO 14.5 m/s    LV SEPTAL E/E' RATIO 12.3 m/s    FS 35.3 28 - 44 %    LV mass 129.4 g    ZLVIDD -3.57     ZLVIDS -2.33     Left Ventricle Relative Wall Thickness 0.27 cm    AV valve area 1.5 cm²    AV Velocity Ratio 0.36     AV index (prosthetic) 0.39     MV valve area p 1/2 method 8.78 cm2    PV peak gradient 3 mmHg    Mean e' 0.12 m/s    Pulm vein S/D ratio 0.91     LVOT area 3.8 cm2    LVOT stroke volume 78.6 cm3    AV peak gradient 25 mmHg    E/E' ratio 13 m/s    LV Systolic Volume Index 20.2 mL/m2    LV Diastolic Volume Index 57.34 mL/m2    LV Mass Index 59.4 g/m2    SANJAY by Velocity Ratio 1.4 cm²    BSA 2.18 m2    VERNA 24 mL/m2    LA Vol 52 cm3    LA WIDTH 4.2 cm    RA Width 3.4 cm    TV resting pulmonary artery pressure 32 mmHg    RV TB RVSP 6 mmHg    Est. RA pres 3 mmHg    Narrative      Left Ventricle: The left ventricle is normal in size. Normal wall   thickness. There is normal systolic function with a visually estimated   ejection fraction of 55 - 70%. Global longitudinal strain is reduced;   13.3%.    Right Ventricle: The right ventricle is normal in sizeWall thickness is   normal. Systolic function is normal.    Aortic Valve: Moderately calcified cusps. Mildly restricted motion.   There is mild stenosis. Aortic valve area by VTI is 1.5 cm². Aortic valve   peak velocity is 2.5 m/s. Mean gradient is 14 mmHg. The dimensionless   index is 0.39. There is mild to moderate aortic regurgitation with a   centrally directed jet.    Mitral  Valve: There is mild regurgitation.    IVC/SVC: Normal venous pressure at 3 mmHg.

## 2025-06-25 NOTE — HPI
Mr. Hart is a 45 year old man with prior HL (s/p surgical resection, chemo, XRT - per some notes he had a left IV port, patient reports he does not remember any chest port for chemotherapy), residual pericarditis vs. Epicarditis post NHL resection, aortic stenosis, hypothyroidism, and atrial flutter s/p ablation (1/2025 by Dr. Cueto) who was transferred from [facility] with SOB and was admitted for 2:1 AV block with intermittent CHB. EP has been consulted for evaluation for PPM insertion       Patient initially presented to [facility] with SOB and chest discomfort for several days. On his home BP machine, he noticed at home his HR would get as low as the 20's. He had associated GIL and dizziness with activity. He denies recent infectious symptoms. He is followed by Dr Paul at [facility].     Last TTE performed (6/24) showed systolic function with a visually estimated ejection fraction of 55 - 70%. Global longitudinal strain is reduced; 13.3%.

## 2025-06-25 NOTE — SUBJECTIVE & OBJECTIVE
Past Medical History:   Diagnosis Date    Aortic stenosis     Attention deficit hyperactivity disorder, combined type     Enlarged lymph node 10/2021    Hodgkin lymphoma 1998    Hypothyroidism        Past Surgical History:   Procedure Laterality Date    ABLATION, ATRIAL FLUTTER, TYPICAL N/A 1/16/2025    Procedure: ABLATION, ATRIAL FLUTTER, TYPICAL;  Surgeon: Christian Cueto MD;  Location: [facility] EP LAB;  Service: Cardiology;  Laterality: N/A;  SVT, RFA, CARTO, MAC, GP, 3 Prep    BIOPSY OF INGUINAL LYMPH NODE Right 10/22/2021    Procedure: BIOPSY, LYMPH NODE, INGUINAL;  Surgeon: Trey Lora III, MD;  Location: [facility] OR;  Service: General;  Laterality: Right;  right inguinal lymph node biopsy    HERNIA REPAIR      as a child    RHINOPLASTY      TONSILLECTOMY      turmor removed from chest      also pericardium        Review of patient's allergies indicates:   Allergen Reactions    Ciprofloxacin Hives and Itching    Levaquin [levofloxacin] Swelling     And itching    Iodine and iodide containing products Itching     And IV contrast         No current facility-administered medications on file prior to encounter.     Current Outpatient Medications on File Prior to Encounter   Medication Sig    dextroamphetamine-amphetamine (ADDERALL) 20 mg tablet Take 1 tablet by mouth once daily.    levothyroxine (SYNTHROID) 100 MCG tablet TAKE 1 TABLET BY MOUTH EVERY DAY    aspirin (ECOTRIN) 81 MG EC tablet Take 1 tablet (81 mg total) by mouth once daily.    atorvastatin (LIPITOR) 10 MG tablet Take 1 tablet (10 mg total) by mouth once daily. (Patient taking differently: Take 10 mg by mouth once daily. States currently takes every other day)    ezetimibe (ZETIA) 10 mg tablet Take 1 tablet (10 mg total) by mouth once daily. (Patient not taking: Reported on 12/18/2024)    hydrocortisone-pramoxine (PROCTOFOAM-HS) rectal foam Place 1 applicator rectally 2 (two) times daily.    [DISCONTINUED] hydrocortisone (ANUSOL-HC) 25 mg suppository  Place 25 mg rectally 2 (two) times daily.     Family History       Problem Relation (Age of Onset)    Heart disease Mother    No Known Problems Father, Sister, Brother          Tobacco Use    Smoking status: Never    Smokeless tobacco: Never   Substance and Sexual Activity    Alcohol use: Yes     Alcohol/week: 1.0 standard drink of alcohol     Types: 1 Glasses of wine per week     Comment: nightly    Drug use: Never    Sexual activity: Not on file     Review of Systems   Constitutional:  Negative for activity change, appetite change, chills and fever.   HENT:  Negative for congestion, sore throat and trouble swallowing.    Eyes:  Negative for photophobia and visual disturbance.   Respiratory:  Positive for shortness of breath. Negative for cough and chest tightness.    Cardiovascular:  Positive for palpitations. Negative for leg swelling.   Gastrointestinal:  Negative for abdominal pain, diarrhea and nausea.   Genitourinary:  Negative for dysuria, flank pain and hematuria.   Musculoskeletal:  Negative for back pain.   Neurological:  Negative for dizziness, weakness and headaches.   Psychiatric/Behavioral:  Negative for confusion.      Objective:     Vital Signs (Most Recent):  Temp: 98.6 °F (37 °C) (06/24/25 1550)  Pulse: (!) 48 (06/24/25 1845)  Resp: (!) 32 (06/24/25 1845)  BP: 127/62 (06/24/25 1845)  SpO2: 97 % (06/24/25 1845) Vital Signs (24h Range):  Temp:  [97.8 °F (36.6 °C)-98.6 °F (37 °C)] 98.6 °F (37 °C)  Pulse:  [42-51] 48  Resp:  [16-34] 32  SpO2:  [94 %-99 %] 97 %  BP: (109-153)/(55-78) 127/62     Weight: 91.2 kg (201 lb)  Body mass index is 25.81 kg/m².     Physical Exam  Vitals reviewed.   Constitutional:       Appearance: Normal appearance. He is normal weight.   HENT:      Head: Normocephalic.      Mouth/Throat:      Mouth: Mucous membranes are moist.      Pharynx: Oropharynx is clear.   Eyes:      General: Lids are normal. Gaze aligned appropriately.      Conjunctiva/sclera: Conjunctivae normal.    Cardiovascular:      Rate and Rhythm: Regular rhythm. Bradycardia present.      Pulses: Normal pulses.      Heart sounds: Murmur heard.   Pulmonary:      Effort: Pulmonary effort is normal.      Breath sounds: Normal breath sounds.   Abdominal:      General: Bowel sounds are normal.      Palpations: Abdomen is soft.   Musculoskeletal:         General: Normal range of motion.      Cervical back: Normal range of motion.   Skin:     General: Skin is warm and dry.   Neurological:      Mental Status: He is alert and oriented to person, place, and time. Mental status is at baseline.   Psychiatric:         Mood and Affect: Mood normal.                Significant Labs: All pertinent labs within the past 24 hours have been reviewed.  CBC:   Recent Labs   Lab 06/24/25  0758   WBC 7.83   HGB 15.0   HCT 42.2        CMP:   Recent Labs   Lab 06/24/25  0758      K 4.5      CO2 23   *   BUN 9   CREATININE 1.0   CALCIUM 8.3*   PROT 6.6   ALBUMIN 3.7   BILITOT 0.7   ALKPHOS 51   AST 16   ALT 18   ANIONGAP 8       Significant Imaging: I have reviewed all pertinent imaging results/findings within the past 24 hours.  Imaging Results              X-Ray Chest 1 View (Final result)  Result time 06/24/25 07:40:07      Final result by Linwood Toribio MD (06/24/25 07:40:07)                   Impression:      No significant intrathoracic abnormality.  Allowing for differences in projection and inspiratory depth level, there has been no significant detrimental interval change in the appearance of the chest since 09/29/2020.      Electronically signed by: Linwood Toribio MD  Date:    06/24/2025  Time:    07:40               Narrative:    EXAMINATION:  XR CHEST 1 VIEW    TECHNIQUE:  One view    COMPARISON:  Comparison is made to 09/29/2020.    FINDINGS:  Heart size is normal, as is the appearance of the pulmonary vascularity.  Lung zones are clear, and are free of significant airspace consolidation or volume loss.  No pleural  fluid.  No hilar or mediastinal mass lesion.  No pneumothorax.

## 2025-06-25 NOTE — ASSESSMENT & PLAN NOTE
Patient admitted for symptomatic bradycardia and found to have CHB with junctional escape in the 40's.     Plan:  - admit to CCU  - telemetry  - K > 4, Mg > 2  - maintain pads at all times, if develops symptoms at rest or wide QRS will place TVP  - EP consulted  - NPO at midnight

## 2025-06-25 NOTE — EICU
Virtual ICU Quality Rounds    Admit Date: 6/24/2025  Hospital Day: 1    ICU Day: 8h    24H Vital Sign Range:  Temp:  [98 °F (36.7 °C)-98.7 °F (37.1 °C)]   Pulse:  [43-77]   Resp:  [10-34]   BP: ()/(54-73)   SpO2:  [94 %-97 %]     VICU Surveillance Screening                    LDA reconciliation : Yes

## 2025-06-25 NOTE — H&P
"[facility] - [facility] Stepdown  Cardiology  History and Physical     Patient Name: Michael Hart  MRN: [id number]  Admission Date: 6/24/2025  Code Status: Full Code   Attending Provider: Kinjal Chinchilla MD   Primary Care Physician: Brittany Neves MD  Principal Problem:Complete heart block    Patient information was obtained from patient, past medical records, and ER records.     Subjective:     Chief Complaint:  pre-syncope     HPI:  Mr. Hart is a 45 year old man with prior NHL (s/p surgical resection, chemo, XRT), residual pericarditis vs. Epicarditis post NHL resection, aortic stenosis, hypothyroidism, and atrial flutter s/p ablation (1/2025) who presented with SOB and was admitted for 2:1 AV block.     Patient initially admitted at [facility]. He presented with SOB and chest discomfort for several days. He noticed at home his HR would get as low as the 20's on his home BP machine and that he had GIL and dizziness with activity. He denies recent infectious symptoms. He is followed by Dr Paul at [facility]. Upon arrival to the ED, patient's HR 40s and blood pressure 119/70. EKG showed 2:1 AV block.  CBC unremarkable, CMP unremarkable. troponin within normal limits.  TSH 0.741.  Repeat TTE performed showed systolic function with a visually estimated ejection fraction of 55 - 70%. Global longitudinal strain is reduced; 13.3%.  Cardiology consulted in the ED and patient accepted for transfer at [facility] to have pacemaker placed.     On arrival to [facility] main campus patient reported overall feeling pretty well. He did not he had the sensation of substernal "rubbing" chest pain which was the same he had gotten years ago and was told was pericarditis. He had his pericardium stripped during surgery by Dr. [facility], however it was felt he had either pericardial remnant pericarditis or epicarditis which was treated in the past with steroids to good effect. ECG here showed 2:1 with narrow escape however on review of " telemetry after arrival patient having periods of 3rd degree heart block with narrow escape prompting transfer to ICU for closer monitor.     Past Medical History:   Diagnosis Date    Aortic stenosis     Attention deficit hyperactivity disorder, combined type     Enlarged lymph node 10/2021    Hodgkin lymphoma 1998    Hypothyroidism        Past Surgical History:   Procedure Laterality Date    ABLATION, ATRIAL FLUTTER, TYPICAL N/A 1/16/2025    Procedure: ABLATION, ATRIAL FLUTTER, TYPICAL;  Surgeon: Christian Cueto MD;  Location: [facility] EP LAB;  Service: Cardiology;  Laterality: N/A;  SVT, RFA, CARTO, MAC, GP, 3 Prep    BIOPSY OF INGUINAL LYMPH NODE Right 10/22/2021    Procedure: BIOPSY, LYMPH NODE, INGUINAL;  Surgeon: Trey Lora III, MD;  Location: [facility] OR;  Service: General;  Laterality: Right;  right inguinal lymph node biopsy    HERNIA REPAIR      as a child    RHINOPLASTY      TONSILLECTOMY      turmor removed from chest      also pericardium        Review of patient's allergies indicates:   Allergen Reactions    Ciprofloxacin Hives and Itching    Levaquin [levofloxacin] Swelling     And itching    Iodine and iodide containing products Itching     And IV contrast         No current facility-administered medications on file prior to encounter.     Current Outpatient Medications on File Prior to Encounter   Medication Sig    dextroamphetamine-amphetamine (ADDERALL) 20 mg tablet Take 1 tablet by mouth once daily.    levothyroxine (SYNTHROID) 100 MCG tablet TAKE 1 TABLET BY MOUTH EVERY DAY    aspirin (ECOTRIN) 81 MG EC tablet Take 1 tablet (81 mg total) by mouth once daily.    atorvastatin (LIPITOR) 10 MG tablet Take 1 tablet (10 mg total) by mouth once daily. (Patient taking differently: Take 10 mg by mouth once daily. States currently takes every other day)    ezetimibe (ZETIA) 10 mg tablet Take 1 tablet (10 mg total) by mouth once daily. (Patient not taking: Reported on 12/18/2024)     hydrocortisone-pramoxine (PROCTOFOAM-HS) rectal foam Place 1 applicator rectally 2 (two) times daily.     Family History       Problem Relation (Age of Onset)    Heart disease Mother    No Known Problems Father, Sister, Brother          Tobacco Use    Smoking status: Never    Smokeless tobacco: Never   Substance and Sexual Activity    Alcohol use: Yes     Alcohol/week: 1.0 standard drink of alcohol     Types: 1 Glasses of wine per week     Comment: nightly    Drug use: Never    Sexual activity: Not on file     Review of Systems   Constitutional: Negative for chills, fever and malaise/fatigue.   Cardiovascular:  Positive for chest pain and dyspnea on exertion. Negative for irregular heartbeat, leg swelling, near-syncope, palpitations and syncope.   Respiratory:  Negative for cough and shortness of breath.      Objective:     Vital Signs (Most Recent):  Temp: 98.3 °F (36.8 °C) (06/24/25 2230)  Pulse: (!) 46 (06/25/25 0043)  Resp: 20 (06/25/25 0043)  BP: (!) 116/59 (06/25/25 0043)  SpO2: (!) 94 % (06/25/25 0043) Vital Signs (24h Range):  Temp:  [97.8 °F (36.6 °C)-98.7 °F (37.1 °C)] 98.3 °F (36.8 °C)  Pulse:  [42-52] 46  Resp:  [16-34] 20  SpO2:  [94 %-99 %] 94 %  BP: (109-153)/(55-78) 116/59     Weight: 96.8 kg (213 lb 6.5 oz)  Body mass index is 27.4 kg/m².    SpO2: (!) 94 %         Intake/Output Summary (Last 24 hours) at 6/25/2025 0045  Last data filed at 6/24/2025 2307  Gross per 24 hour   Intake 100 ml   Output --   Net 100 ml       Lines/Drains/Airways       Peripheral Intravenous Line  Duration             Peripheral IV 06/24/25 0804 20 G Right Antecubital <1 day    Peripheral IV 06/24/25 1515 20 G 1 1/4 in Anterior;Distal;Left Antecubital <1 day                     Physical Exam  Vitals reviewed.   Constitutional:       General: He is not in acute distress.  Eyes:      General: No scleral icterus.  Cardiovascular:      Rate and Rhythm: Regular rhythm. Bradycardia present.      Pulses: Normal pulses.      Heart  "sounds: Murmur heard.   Pulmonary:      Effort: Pulmonary effort is normal. No respiratory distress.      Breath sounds: No wheezing.   Abdominal:      General: There is no distension.      Palpations: Abdomen is soft.   Musculoskeletal:      Right lower leg: No edema.      Left lower leg: No edema.   Skin:     General: Skin is warm and dry.   Neurological:      Mental Status: He is alert and oriented to person, place, and time.   Psychiatric:         Mood and Affect: Mood normal.         Thought Content: Thought content normal.         Judgment: Judgment normal.          Significant Labs: ABG: No results for input(s): "PH", "PCO2", "HCO3", "POCSATURATED", "BE" in the last 48 hours., CMP   Recent Labs   Lab 06/24/25  0758      K 4.5      CO2 23   *   BUN 9   CREATININE 1.0   CALCIUM 8.3*   PROT 6.6   ALBUMIN 3.7   BILITOT 0.7   ALKPHOS 51   AST 16   ALT 18   ANIONGAP 8   , CBC   Recent Labs   Lab 06/24/25  0758 06/24/25  2337   WBC 7.83 10.89   HGB 15.0 14.2   HCT 42.2 41.3    179   , INR No results for input(s): "INR", "PROTIME" in the last 48 hours., Lipid Panel No results for input(s): "CHOL", "HDL", "LDLCALC", "TRIG", "CHOLHDL" in the last 48 hours., Troponin No results for input(s): "TROPONINIHS" in the last 48 hours., and All pertinent lab results from the last 24 hours have been reviewed.    Significant Imaging: Echocardiogram: Transthoracic echo (TTE) complete (Cupid Only):   Results for orders placed or performed during the hospital encounter of 06/24/25   Echo   Result Value Ref Range    Triscuspid Valve Regurgitation Peak Gradient 30 mmHg    Left Atrium Major Axis 4.3 cm    Left Atrium Minor Axis 4.1 cm    RA Major Axis 4.68 cm    LV Diastolic Volume 125 mL    LV Systolic Volume 44 mL    PV Peak D Grabiel 0.69 m/s    PV Peak S Grabiel 0.63 m/s    MV stenosis pressure 1/2 time 25.05 ms    TR Max Grabiel 2.7 m/s    MV Peak E Grabiel 1.60 m/s    Mr max grabiel 4.88 m/s    Ao VTI 53.6 cm    Ao peak grabiel " 2.5 m/s    LVOT peak VTI 20.7 cm    LVOT peak milad 0.9 m/s    LVOT diameter 2.2 cm    E wave deceleration time 86 msec    AV mean gradient 14 mmHg    LA size 3.5 cm    LVIDs 3.3 2.1 - 4.0 cm    PW 0.7 0.6 - 1.1 cm    IVS 0.8 0.6 - 1.1 cm    LVIDd 5.1 3.5 - 6.0 cm    PV PEAK VELOCITY 0.87 m/s    TDI LATERAL 0.11 m/s    Left Ventricular Outflow Tract Mean Gradient 1.67 mmHg    Left Ventricular Outflow Tract Mean Velocity 0.63 cm/s    Left Ventricular End Systolic Volume by Teichholz Method 44.15 mL    Left Ventricular End Diastolic Volume by Teichholz Method 125.33 mL    IVC diameter 2.01 cm    TDI SEPTAL 0.13 m/s    LV LATERAL E/E' RATIO 14.5 m/s    LV SEPTAL E/E' RATIO 12.3 m/s    FS 35.3 28 - 44 %    LV mass 129.4 g    ZLVIDD -3.57     ZLVIDS -2.33     Left Ventricle Relative Wall Thickness 0.27 cm    AV valve area 1.5 cm²    AV Velocity Ratio 0.36     AV index (prosthetic) 0.39     MV valve area p 1/2 method 8.78 cm2    PV peak gradient 3 mmHg    Mean e' 0.12 m/s    Pulm vein S/D ratio 0.91     LVOT area 3.8 cm2    LVOT stroke volume 78.6 cm3    AV peak gradient 25 mmHg    E/E' ratio 13 m/s    LV Systolic Volume Index 20.2 mL/m2    LV Diastolic Volume Index 57.34 mL/m2    LV Mass Index 59.4 g/m2    SANJAY by Velocity Ratio 1.4 cm²    BSA 2.18 m2    VERNA 24 mL/m2    LA Vol 52 cm3    LA WIDTH 4.2 cm    RA Width 3.4 cm    TV resting pulmonary artery pressure 32 mmHg    RV TB RVSP 6 mmHg    Est. RA pres 3 mmHg    Narrative      Left Ventricle: The left ventricle is normal in size. Normal wall   thickness. There is normal systolic function with a visually estimated   ejection fraction of 55 - 70%. Global longitudinal strain is reduced;   13.3%.    Right Ventricle: The right ventricle is normal in sizeWall thickness is   normal. Systolic function is normal.    Aortic Valve: Moderately calcified cusps. Mildly restricted motion.   There is mild stenosis. Aortic valve area by VTI is 1.5 cm². Aortic valve   peak velocity is 2.5  m/s. Mean gradient is 14 mmHg. The dimensionless   index is 0.39. There is mild to moderate aortic regurgitation with a   centrally directed jet.    Mitral Valve: There is mild regurgitation.    IVC/SVC: Normal venous pressure at 3 mmHg.       Assessment and Plan:     * Complete heart block  Patient admitted for symptomatic bradycardia and found to have CHB with junctional escape in the 40's.     Plan:  - admit to CCU  - telemetry  - K > 4, Mg > 2  - maintain pads at all times, if develops symptoms at rest or wide QRS will place TVP  - EP consulted  - NPO at midnight    Attention deficit hyperactivity disorder, combined type  - hold home meds    Postablative hypothyroidism  - continue synthroid    Hypercholesterolemia  - continue statin          VTE Risk Mitigation (From admission, onward)           Ordered     IP VTE LOW RISK PATIENT  Once         06/24/25 9880                    Michael Hart MD  Cardiology   [facility] - [facility] Stepdown

## 2025-06-25 NOTE — PLAN OF CARE
SICU PLAN OF CARE    Dx: Complete heart block    Vital Signs (last 12 hours):   Temp:  [98 °F (36.7 °C)-98.1 °F (36.7 °C)]   Pulse:  [43-77]   Resp:  [10-31]   BP: ()/(55-72)   SpO2:  [94 %-99 %]      Neuro: AAOx4, Follows commands , and Moves all extremities spontaneously     Cardiac: Rhythm: sinus bradycardia, 2:1 AV Block    Respiratory: Room Air    Urine Output: Voids Spontaneously  500 mL/shift    Diet: Regular     Skin:  Skin intact, no lesions noted.  Patient turned q2h, bony prominences protected, and mattress inflated/working correctly.     Shift Events:  No acute events for shift. Cardiac MRI. NPO at midnight for PPM  placement tomorrow. See flowsheet for further assessment/details.  Patient and family updated on current condition/plan of care, questions answered, and emotional support provided.  MD updated on current condition, vitals, labs, and gtts.

## 2025-06-25 NOTE — HPI
"Mr. Hart is a 45 year old man with prior NHL (s/p surgical resection, chemo, XRT), residual pericarditis vs. Epicarditis post NHL resection, aortic stenosis, hypothyroidism, and atrial flutter s/p ablation (1/2025) who presented with SOB and was admitted for 2:1 AV block.     Patient initially admitted at [facility]. He presented with SOB and chest discomfort for several days. He noticed at home his HR would get as low as the 20's on his home BP machine and that he had GIL and dizziness with activity. He denies recent infectious symptoms. He is followed by Dr Paul at [facility]. Upon arrival to the ED, patient's HR 40s and blood pressure 119/70. EKG showed 2:1 AV block.  CBC unremarkable, CMP unremarkable. troponin within normal limits.  TSH 0.741.  Repeat TTE performed showed systolic function with a visually estimated ejection fraction of 55 - 70%. Global longitudinal strain is reduced; 13.3%.  Cardiology consulted in the ED and patient accepted for transfer at [facility] to have pacemaker placed.     On arrival to [facility] patient reported overall feeling pretty well. He did not he had the sensation of substernal "rubbing" chest pain which was the same he had gotten years ago and was told was pericarditis. He had his pericardium stripped during surgery by Dr. [facility], however it was felt he had either pericardial remnant pericarditis or epicarditis which was treated in the past with steroids to good effect. ECG here showed 2:1 with narrow escape however on review of telemetry after arrival patient having periods of 3rd degree heart block with narrow escape prompting transfer to ICU for closer monitor.   "

## 2025-06-25 NOTE — CONSULTS
[facility]  Cardiac Electrophysiology  Consult Note    Admission Date: 6/24/2025  Code Status: Full Code   Attending Provider: Kinjal Chinchilla MD  Consulting Provider: Doug Pierce MD  Principal Problem:Complete heart block    Inpatient consult to Electrophysiology  Consult performed by: Doug Hitchcock MD  Consult ordered by: Michael Hart MD        Subjective:     Chief Complaint:  Dizziness      HPI:   Mr. Hart is a 45 year old man with prior HL (s/p surgical resection, chemo, XRT - per some notes he had a left IV port, patient reports he does not remember any chest port for chemotherapy), residual pericarditis vs. Epicarditis post HL resection, aortic stenosis, hypothyroidism, and atrial flutter s/p ablation (1/2025 by Dr. Cueto) who was transferred from [facility] with SOB and was admitted for 2:1 AV block with intermittent CHB. EP has been consulted for evaluation for PPM insertion       Patient initially presented to [facility] with SOB and chest discomfort for several days. On his home BP machine, he noticed at home his HR would get as low as the 20's. He had associated GIL and dizziness with activity. He denies recent infectious symptoms. He is followed by Dr Paul at [facility].     Last TTE performed (6/24) showed systolic function with a visually estimated ejection fraction of 55 - 70%. Global longitudinal strain is reduced; 13.3%.       Past Medical History:   Diagnosis Date    Aortic stenosis     Atrial flutter 06/24/2025    Attention deficit hyperactivity disorder, combined type     Enlarged lymph node 10/2021    Hodgkin lymphoma 1998    Hypothyroidism     SVT (supraventricular tachycardia) 09/24/2024       Past Surgical History:   Procedure Laterality Date    ABLATION, ATRIAL FLUTTER, TYPICAL N/A 1/16/2025    Procedure: ABLATION, ATRIAL FLUTTER, TYPICAL;  Surgeon: Christian Cueto MD;  Location: [facility] EP LAB;  Service: Cardiology;   Laterality: N/A;  SVT, RFA, CARTO, MAC, GP, 3 Prep    BIOPSY OF INGUINAL LYMPH NODE Right 10/22/2021    Procedure: BIOPSY, LYMPH NODE, INGUINAL;  Surgeon: Trey Lora III, MD;  Location: [facility];  Service: General;  Laterality: Right;  right inguinal lymph node biopsy    HERNIA REPAIR      as a child    RHINOPLASTY      TONSILLECTOMY      turmor removed from chest      also pericardium        Review of patient's allergies indicates:   Allergen Reactions    Ciprofloxacin Hives and Itching    Levaquin [levofloxacin] Swelling     And itching    Iodine and iodide containing products Itching     And IV contrast         No current facility-administered medications on file prior to encounter.     Current Outpatient Medications on File Prior to Encounter   Medication Sig    dextroamphetamine-amphetamine (ADDERALL) 20 mg tablet Take 1 tablet by mouth once daily.    levothyroxine (SYNTHROID) 100 MCG tablet TAKE 1 TABLET BY MOUTH EVERY DAY    aspirin (ECOTRIN) 81 MG EC tablet Take 1 tablet (81 mg total) by mouth once daily.    atorvastatin (LIPITOR) 10 MG tablet Take 1 tablet (10 mg total) by mouth once daily. (Patient taking differently: Take 10 mg by mouth once daily. States currently takes every other day)    hydrocortisone-pramoxine (PROCTOFOAM-HS) rectal foam Place 1 applicator rectally 2 (two) times daily.    [DISCONTINUED] ezetimibe (ZETIA) 10 mg tablet Take 1 tablet (10 mg total) by mouth once daily. (Patient not taking: Reported on 12/18/2024)     Family History       Problem Relation (Age of Onset)    Heart disease Mother    No Known Problems Father, Sister, Brother          Tobacco Use    Smoking status: Never    Smokeless tobacco: Never   Substance and Sexual Activity    Alcohol use: Yes     Alcohol/week: 1.0 standard drink of alcohol     Types: 1 Glasses of wine per week     Comment: nightly    Drug use: Never    Sexual activity: Not on file     ROS  Objective:     Vital Signs (Most Recent):  Temp: 98.1 °F  (36.7 °C) (06/25/25 0715)  Pulse: (!) 45 (06/25/25 0900)  Resp: (!) 21 (06/25/25 0900)  BP: (!) 107/59 (06/25/25 0800)  SpO2: 96 % (06/25/25 0900) Vital Signs (24h Range):  Temp:  [98 °F (36.7 °C)-98.7 °F (37.1 °C)] 98.1 °F (36.7 °C)  Pulse:  [42-77] 45  Resp:  [10-34] 21  SpO2:  [94 %-99 %] 96 %  BP: ()/(54-76) 107/59       Weight: 92.1 kg (203 lb 0.7 oz)  Body mass index is 26.07 kg/m².    SpO2: 96 %        Physical Exam  HENT:      Mouth/Throat:      Mouth: Mucous membranes are moist.   Cardiovascular:      Rate and Rhythm: Regular rhythm. Bradycardia present.      Pulses: Normal pulses.   Pulmonary:      Effort: Pulmonary effort is normal.   Skin:     General: Skin is warm.      Capillary Refill: Capillary refill takes less than 2 seconds.   Neurological:      Mental Status: He is alert.            Significant Labs:   Recent Lab Results  (Last 5 results in the past 24 hours)        06/25/25  0738   06/25/25  0431   06/25/25  0430   06/25/25  0026   06/24/25  2337        ABO and RH O POS               Albumin     3.5           ALP     56           ALT     15           Anion Gap     8           PTT     29.1  Comment: Refer to local heparin nomogram for intensity/dose specific therapeutic range.           AST     12           Baso #     0.05     0.04       Basophil %     0.5     0.4       BILIRUBIN TOTAL     0.6  Comment: For infants and newborns, interpretation of results should be based   on gestational age, weight and in agreement with clinical   observations.    Premature Infant recommended reference ranges:   0-24 hours:  <8.0 mg/dL   24-48 hours: <12.0 mg/dL   3-5 days:    <15.0 mg/dL   6-29 days:   <15.0 mg/dL           BUN     12           Calcium     8.1           Chloride     107           CO2     22           Creatinine     0.9           CRP, High Sensitivity     11.23           eGFR     >60  Comment: Estimated GFR calculated using the CKD-EPI creatinine (2021) equation.           Eos #     0.27      0.11       Eos %     2.9     1.0       Glucose     90           Gran # (ANC)     6.77     9.12       Group & Rh     O POS           Hematocrit     41.2     41.3       Hemoglobin     14.0     14.2       Immature Grans (Abs)     0.02  Comment: Mild elevation in immature granulocytes is non specific and can be seen in a variety of conditions including stress response, acute inflammation, trauma and pregnancy. Correlation with other laboratory and clinical findings is essential.     0.03  Comment: Mild elevation in immature granulocytes is non specific and can be seen in a variety of conditions including stress response, acute inflammation, trauma and pregnancy. Correlation with other laboratory and clinical findings is essential.       Immature Granulocytes     0.2     0.3       INDIRECT CLARENCE     NEG           INR     0.9  Comment: Coumadin Therapy:    2.0 - 3.0 for INR for all indicators except mechanical heart valves    and antiphospholipid syndromes which should use 2.5 - 3.5.           Lymph #     1.55     1.11       Lymph %     16.8     10.2       Magnesium      2.0           MCH     30.2     30.5       MCHC     34.0     34.4       MCV     89     89       Mono #     0.54     0.48       Mono %     5.9     4.4       MPV     10.6     10.5       Neut %     73.7     83.7       nRBC     0     0       QRS Duration       90         OHS QTC Calculation       442         Platelet Count     168     179       POCT Glucose   93             Potassium     4.5           PROTEIN TOTAL     5.9           PT     10.4           RBC     4.63     4.65       RDW     11.9     12.0       Sed Rate     5           Sodium     137           Specimen Outdate     06/28/2025 23:59           WBC     9.20     10.89                                        Assessment and Plan:     * Complete heart block  Patient admitted for symptomatic bradycardia and found to have 2:1 AV block with intermittent CHB with junctional escape in the 40's. He has  important history o chest radiation which could contribute as etiology of his evy-arrhythmia, however given his age and CHB, would like to rule out infiltrative diseases, specifically sarcoidosis, before implanting a device.      Plan:  - Cardiac MRI order; to rule out sarcoidosis   - telemetry  - K > 4, Mg > 2  - Maintain pads at all times, if develops symptoms at rest or wide QRS will place TVP  - Once MRI is done and resulted, will plan for PPM insertion and type of device          Thank you for your consult. I will follow-up with patient. Please contact us if you have any additional questions.    Doug Pierce MD  Cardiac Electrophysiology  [facility]

## 2025-06-25 NOTE — ASSESSMENT & PLAN NOTE
Patient admitted for symptomatic bradycardia and found to have 2:1 AV block with intermittent CHB with junctional escape in the 40's. He has important history o chest radiation which could contribute as etiology of his evy-arrhythmia, however given his age and CHB, would like to rule out infiltrative diseases, specifically sarcoidosis, before implanting a device.      Plan:  - Cardiac MRI order; to rule out sarcoidosis   - telemetry  - K > 4, Mg > 2  - Maintain pads at all times, if develops symptoms at rest or wide QRS will place TVP  - Once MRI is done and resulted, will plan for PPM insertion and type of device

## 2025-06-25 NOTE — ANESTHESIA PREPROCEDURE EVALUATION
[facility]  Anesthesia Pre-Operative Evaluation         Patient Name: Michael Hart  YOB: 1980  MRN: [id number]    SUBJECTIVE:     Pre-operative Evaluation for Procedure(s) (LRB):  INSERTION, CARDIAC PACEMAKER, DUAL CHAMBER (N/A)     06/25/2025    Michael Hart is a 45 y.o. male with a PMHx significant for Hodkin Lymphoma s/p surgical resection, chemo, XRT c/b epicarditis post HL resection, aortic stenosis, hypothyroidism, and atrial flutter s/p ablation (1/2025 by Dr. Cueto) transferred after presenting to [facility] with symptomatic 2:1 AV block intermittent CHB with junctional escape in to 40s.     Level of Care: TCVICU  Hemodynamic Status: 100/54 -145/73  Respiratory Status: on RA  IV Access: 20G x2  NPO Status: NPOMN    Previous Airway: None documented    LDA:   Peripheral IV 06/24/25 0804 20 G Right Antecubital (Active)   Site Assessment Clean;Dry;Intact;No redness;No swelling 06/25/25 1101   Line Securement Device Secured with sutureless device 06/25/25 1101   Extremity Assessment Distal to IV No abnormal discoloration;No swelling;No redness;No warmth 06/25/25 1101   Dressing Status Clean;Dry;Intact 06/25/25 1101   Dressing Intervention Integrity maintained 06/25/25 1101   Dressing Change Due 06/28/25 06/25/25 1101   Site Change Due 06/28/25 06/25/25 1101   Reason Not Rotated Not due 06/25/25 1101   Number of days: 1       Peripheral IV 06/24/25 1515 20 G 1 1/4 in Anterior;Distal;Left Antecubital (Active)   Site Assessment Clean;Dry;Intact;No redness;No swelling 06/25/25 1101   Line Securement Device Secured with sutureless device 06/25/25 1101   Extremity Assessment Distal to IV No abnormal discoloration;No redness;No warmth;No swelling 06/25/25 1101   Dressing Status Clean;Dry;Intact 06/25/25 1101   Dressing Intervention Integrity maintained 06/25/25 1101   Dressing Change Due 06/28/25 06/25/25 1101   Site Change Due 06/28/25 06/25/25 1101   Reason Not Rotated Not due  06/25/25 1101   Number of days: 1       Drips: None documented        Problem List[1]    Review of patient's allergies indicates:   Allergen Reactions    Ciprofloxacin Hives and Itching    Levaquin [levofloxacin] Swelling     And itching    Iodine and iodide containing products Itching     And IV contrast         Current Inpatient Medications:   atorvastatin  10 mg Oral Daily    ibuprofen  600 mg Oral TID    levothyroxine  100 mcg Oral Before breakfast    pantoprazole  40 mg Oral Daily       Past Surgical History:   Procedure Laterality Date    ABLATION, ATRIAL FLUTTER, TYPICAL N/A 1/16/2025    Procedure: ABLATION, ATRIAL FLUTTER, TYPICAL;  Surgeon: Christian Cueto MD;  Location: [facility] EP LAB;  Service: Cardiology;  Laterality: N/A;  SVT, RFA, CARTO, MAC, GP, 3 Prep    BIOPSY OF INGUINAL LYMPH NODE Right 10/22/2021    Procedure: BIOPSY, LYMPH NODE, INGUINAL;  Surgeon: Trey Lora III, MD;  Location: [facility] OR;  Service: General;  Laterality: Right;  right inguinal lymph node biopsy    HERNIA REPAIR      as a child    RHINOPLASTY      TONSILLECTOMY      turmor removed from chest      also pericardium        Social History     Substance and Sexual Activity   Drug Use Never     Tobacco Use: Low Risk  (6/24/2025)    Patient History     Smoking Tobacco Use: Never     Smokeless Tobacco Use: Never     Passive Exposure: Not on file     Alcohol Use: Alcohol Misuse (12/5/2023)    AUDIT-C     Frequency of Alcohol Consumption: 4 or more times a week     Average Number of Drinks: 1 or 2     Frequency of Binge Drinking: Monthly       OBJECTIVE:     Vital Signs Range (Last 24H):  Temp:  [36.7 °C (98 °F)-37.1 °C (98.7 °F)]   Pulse:  [43-77]   Resp:  [10-34]   BP: ()/(54-73)   SpO2:  [94 %-99 %]       Significant Labs    Heme Profile  Lab Results   Component Value Date    WBC 9.20 06/25/2025    HGB 14.0 06/25/2025    HCT 41.2 06/25/2025     06/25/2025       Coagulation Studies  Lab Results   Component Value Date     LABPROT 11.1 01/09/2025    INR 0.9 06/25/2025    APTT 29.1 06/25/2025       Metabolic Profile  Lab Results   Component Value Date     06/25/2025    K 4.5 06/25/2025     06/25/2025    CO2 22 (L) 06/25/2025    BUN 12 06/25/2025    CREATININE 0.9 06/25/2025    MG 2.0 06/25/2025       Liver Function Tests  Lab Results   Component Value Date    AST 12 06/25/2025    ALT 15 06/25/2025    ALKPHOS 56 06/25/2025    BILITOT 0.6 06/25/2025    PROT 5.9 (L) 06/25/2025    ALBUMIN 3.5 06/25/2025       Lipid Profile  Lab Results   Component Value Date    CHOL 120 03/18/2024    HDL 43 03/18/2024    TRIG 124 03/18/2024       Endocrine Profile  Lab Results   Component Value Date    HGBA1C 5.0 10/26/2021    TSH 0.741 06/24/2025       Diagnostic Studies      EKG:   Results for orders placed or performed during the hospital encounter of 06/24/25   EKG 12-lead    Collection Time: 06/25/25 12:26 AM   Result Value Ref Range    QRS Duration 90 ms    OHS QTC Calculation 442 ms    Narrative    Test Reason : R07.9,    Vent. Rate :  51 BPM     Atrial Rate :  51 BPM     P-R Int : 214 ms          QRS Dur :  90 ms      QT Int : 480 ms       P-R-T Axes :  62  34  45 degrees    QTcB Int : 442 ms    Sinus bradycardia with 1st degree A-V block  Left atrial enlargement  Borderline Abnormal ECG  When compared with ECG of 24-Jun-2025 07:43,  QT has lengthened  Confirmed by Pineda Jules ([id number]) on 6/25/2025 8:55:26 AM    Referred By: AAAREFERRAL SELF           Confirmed By: Pineda Jules       TTE  Results for orders placed during the hospital encounter of 06/24/25    Echo    Interpretation Summary    Left Ventricle: The left ventricle is normal in size. Normal wall thickness. There is normal systolic function with a visually estimated ejection fraction of 55 - 70%. Global longitudinal strain is reduced; 13.3%.    Right Ventricle: The right ventricle is normal in sizeWall thickness is normal. Systolic function is normal.    Aortic Valve:  Moderately calcified cusps. Mildly restricted motion. There is mild stenosis. Aortic valve area by VTI is 1.5 cm². Aortic valve peak velocity is 2.5 m/s. Mean gradient is 14 mmHg. The dimensionless index is 0.39. There is mild to moderate aortic regurgitation with a centrally directed jet.    Mitral Valve: There is mild regurgitation.    IVC/SVC: Normal venous pressure at 3 mmHg.        Stress Echo   Results for orders placed in visit on 09/18/24    Stress Echo Which stress agent will be used? Treadmill Exercise; Color Flow Doppler? No    Interpretation Summary    Left Ventricle: The left ventricle is normal in size. Normal wall thickness. There appears to be a free wall rupture. Normal wall motion. There is normal systolic function with a visually estimated ejection fraction of 60 - 65%.    Right Ventricle: Normal right ventricular cavity size.    Stress Protocol: The patient exercised for 9 minutes 0 seconds on a Portillo protocol, corresponding to a functional capacity of 10.3METS, achieving a peak heart rate of 170 bpm, which is 98% of the age predicted maximum heart rate. The patient reported no symptoms during the stress test. The test was stopped because the patient experienced fatigue.    Baseline ECG: The Baseline ECG reveals sinus rhythm. The axis is normal. The ST segments are normal.    Stress ECG: There are no ST segment deviation identified during the protocol. During stress, multiple runs of SVT was noted at peak exercise and in early recovery at rates up to 165 beats per minute these runs of SVT subsided in recovery There is normal blood pressure response with stress.  Suspect AV daniel reentry tachycardia    Post-stress Echo: The left ventricle systolic function is normal with an EF of 68%.    Post-stress Conclusion: The study is normal.    Recommend event recorder, consider EP evaluation        ASSESSMENT/PLAN:     Michael Hart is a 45 y.o. male with PMHx significant for Hodkin Lymphoma s/p  surgical resection, chemo, XRT c/b epicarditis post HL resection, aortic stenosis, hypothyroidism, and atrial flutter s/p ablation (1/2025 by Dr. Cueto) transferred after presenting to [facility] with symptomatic 2:1 AV block intermittent CHB with junctional escape in to 40s.     Pre-op Assessment    I have reviewed the Patient Summary Reports.     I have reviewed the Nursing Notes. I have reviewed the NPO Status.   I have reviewed the Medications.     Review of Systems  Anesthesia Hx:  No problems with previous Anesthesia   History of prior surgery of interest to airway management or planning:          Denies Family Hx of Anesthesia complications.    Denies Personal Hx of Anesthesia complications.                    Hematology/Oncology:                        --  Cancer in past history:              chemotherapy, radiation and surgery       Cardiovascular:         Dysrhythmias                                      Endocrine:   Hypothyroidism              Physical Exam  General: Well nourished, Cooperative, Alert and Oriented    Airway:  Mallampati: III / I  Mouth Opening: Normal  TM Distance: Normal  Tongue: Normal  Neck ROM: Normal ROM    Dental:  Intact        Anesthesia Plan  Type of Anesthesia, risks & benefits discussed:    Anesthesia Type: Gen ETT, Gen Natural Airway, Gen Supraglottic Airway  Intra-op Monitoring Plan: Standard ASA Monitors  Post Op Pain Control Plan: multimodal analgesia and IV/PO Opioids PRN  Induction:  IV  Airway Plan: Video and Direct  Informed Consent: Informed consent signed with the Patient and all parties understand the risks and agree with anesthesia plan.  All questions answered.   ASA Score: 3  Day of Surgery Review of History & Physical: H&P Update referred to the surgeon/provider.  Anesthesia Plan Notes: Plan for general natural airway, discussed anesthetic risks, questions answered    Ready For Surgery From Anesthesia Perspective.     .           [1]   Patient Active Problem  List  Diagnosis    Lymph node enlargement    Nonrheumatic aortic valve stenosis    Hypercholesterolemia    Postablative hypothyroidism    Allergy to iodine    Attention deficit hyperactivity disorder, combined type    Complete heart block

## 2025-06-25 NOTE — ASSESSMENT & PLAN NOTE
Mild-moderate aortic stenosis on prior echo    Patient presenting with shortness of breath. Mild chest pain and HR in 50s at home. States resting HR is usually 70s. Patient found to have HR 40s while in the ED. Repeat TTE showed normal systolic function with a visually estimated ejection fraction of 55 - 70%. Global longitudinal strain is reduced; 13.3%.  Patient is followed by Dr Wise, cardiology at [facility]. Cardiology, Dr Boggs, consulted and case discussed with Dr Cueto (EP) and Dr Wise (cardiology)    -Dr. Cueto requests transfer to [facility] for pacemaker evaluation.  -continue to follow recommendations of cardiology  -monitor on telemetry

## 2025-06-25 NOTE — NURSING
Reviewed my MD Hart - patient monitoring at times with alex MONTEJO. Patient denies chest pain, no  light headedness or dizziness, no SOB. Latest /59 MAP 82, HR 46. Patient for transfer to ICU.- waiting bed.

## 2025-06-26 ENCOUNTER — RESULTS FOLLOW-UP (OUTPATIENT)
Dept: CARDIOLOGY | Facility: HOSPITAL | Age: 45
End: 2025-06-26

## 2025-06-26 ENCOUNTER — ANESTHESIA (OUTPATIENT)
Dept: MEDSURG UNIT | Facility: HOSPITAL | Age: 45
DRG: 244 | End: 2025-06-26
Payer: MEDICAID

## 2025-06-26 VITALS
SYSTOLIC BLOOD PRESSURE: 128 MMHG | OXYGEN SATURATION: 98 % | WEIGHT: 203.06 LBS | HEIGHT: 74 IN | BODY MASS INDEX: 26.06 KG/M2 | TEMPERATURE: 98 F | HEART RATE: 97 BPM | RESPIRATION RATE: 21 BRPM | DIASTOLIC BLOOD PRESSURE: 70 MMHG

## 2025-06-26 LAB
ABSOLUTE EOSINOPHIL (OHS): 0.05 K/UL
ABSOLUTE MONOCYTE (OHS): 0.17 K/UL (ref 0.3–1)
ABSOLUTE NEUTROPHIL COUNT (OHS): 7.87 K/UL (ref 1.8–7.7)
ALBUMIN SERPL BCP-MCNC: 3.6 G/DL (ref 3.5–5.2)
ALP SERPL-CCNC: 60 UNIT/L (ref 40–150)
ALT SERPL W/O P-5'-P-CCNC: 16 UNIT/L (ref 10–44)
ANION GAP (OHS): 10 MMOL/L (ref 8–16)
ANION GAP (OHS): 8 MMOL/L (ref 8–16)
AST SERPL-CCNC: 13 UNIT/L (ref 11–45)
BASOPHILS # BLD AUTO: 0.04 K/UL
BASOPHILS NFR BLD AUTO: 0.5 %
BILIRUB SERPL-MCNC: 0.4 MG/DL (ref 0.1–1)
BUN SERPL-MCNC: 14 MG/DL (ref 6–20)
BUN SERPL-MCNC: 14 MG/DL (ref 6–20)
CALCIUM SERPL-MCNC: 8.3 MG/DL (ref 8.7–10.5)
CALCIUM SERPL-MCNC: 8.9 MG/DL (ref 8.7–10.5)
CHLORIDE SERPL-SCNC: 105 MMOL/L (ref 95–110)
CHLORIDE SERPL-SCNC: 106 MMOL/L (ref 95–110)
CO2 SERPL-SCNC: 20 MMOL/L (ref 23–29)
CO2 SERPL-SCNC: 24 MMOL/L (ref 23–29)
CREAT SERPL-MCNC: 1 MG/DL (ref 0.5–1.4)
CREAT SERPL-MCNC: 1 MG/DL (ref 0.5–1.4)
ERYTHROCYTE [DISTWIDTH] IN BLOOD BY AUTOMATED COUNT: 11.9 % (ref 11.5–14.5)
GFR SERPLBLD CREATININE-BSD FMLA CKD-EPI: >60 ML/MIN/1.73/M2
GFR SERPLBLD CREATININE-BSD FMLA CKD-EPI: >60 ML/MIN/1.73/M2
GLUCOSE SERPL-MCNC: 167 MG/DL (ref 70–110)
GLUCOSE SERPL-MCNC: 177 MG/DL (ref 70–110)
HCT VFR BLD AUTO: 43.8 % (ref 40–54)
HGB BLD-MCNC: 15 GM/DL (ref 14–18)
IMM GRANULOCYTES # BLD AUTO: 0.03 K/UL (ref 0–0.04)
IMM GRANULOCYTES NFR BLD AUTO: 0.3 % (ref 0–0.5)
LYMPHOCYTES # BLD AUTO: 0.66 K/UL (ref 1–4.8)
MAGNESIUM SERPL-MCNC: 2.1 MG/DL (ref 1.6–2.6)
MCH RBC QN AUTO: 30.4 PG (ref 27–31)
MCHC RBC AUTO-ENTMCNC: 34.2 G/DL (ref 32–36)
MCV RBC AUTO: 89 FL (ref 82–98)
NUCLEATED RBC (/100WBC) (OHS): 0 /100 WBC
OHS QRS DURATION: 160 MS
OHS QRS DURATION: 162 MS
OHS QTC CALCULATION: 549 MS
OHS QTC CALCULATION: 561 MS
PLATELET # BLD AUTO: 173 K/UL (ref 150–450)
PMV BLD AUTO: 10.9 FL (ref 9.2–12.9)
POCT GLUCOSE: 179 MG/DL (ref 70–110)
POTASSIUM SERPL-SCNC: 4.2 MMOL/L (ref 3.5–5.1)
POTASSIUM SERPL-SCNC: 5.4 MMOL/L (ref 3.5–5.1)
PROT SERPL-MCNC: 6.4 GM/DL (ref 6–8.4)
RBC # BLD AUTO: 4.93 M/UL (ref 4.6–6.2)
RELATIVE EOSINOPHIL (OHS): 0.6 %
RELATIVE LYMPHOCYTE (OHS): 7.5 % (ref 18–48)
RELATIVE MONOCYTE (OHS): 1.9 % (ref 4–15)
RELATIVE NEUTROPHIL (OHS): 89.2 % (ref 38–73)
SODIUM SERPL-SCNC: 134 MMOL/L (ref 136–145)
SODIUM SERPL-SCNC: 139 MMOL/L (ref 136–145)
WBC # BLD AUTO: 8.82 K/UL (ref 3.9–12.7)

## 2025-06-26 PROCEDURE — 0JH606Z INSERTION OF PACEMAKER, DUAL CHAMBER INTO CHEST SUBCUTANEOUS TISSUE AND FASCIA, OPEN APPROACH: ICD-10-PCS | Performed by: INTERNAL MEDICINE

## 2025-06-26 PROCEDURE — 37000008 HC ANESTHESIA 1ST 15 MINUTES: Performed by: INTERNAL MEDICINE

## 2025-06-26 PROCEDURE — 63600175 PHARM REV CODE 636 W HCPCS: Performed by: INTERNAL MEDICINE

## 2025-06-26 PROCEDURE — 25000003 PHARM REV CODE 250

## 2025-06-26 PROCEDURE — 25000003 PHARM REV CODE 250: Performed by: STUDENT IN AN ORGANIZED HEALTH CARE EDUCATION/TRAINING PROGRAM

## 2025-06-26 PROCEDURE — 37000009 HC ANESTHESIA EA ADD 15 MINS: Performed by: INTERNAL MEDICINE

## 2025-06-26 PROCEDURE — 25000003 PHARM REV CODE 250: Performed by: NURSE PRACTITIONER

## 2025-06-26 PROCEDURE — 25000003 PHARM REV CODE 250: Performed by: INTERNAL MEDICINE

## 2025-06-26 PROCEDURE — 63600175 PHARM REV CODE 636 W HCPCS: Performed by: NURSE ANESTHETIST, CERTIFIED REGISTERED

## 2025-06-26 PROCEDURE — 63600175 PHARM REV CODE 636 W HCPCS: Mod: JZ,TB | Performed by: STUDENT IN AN ORGANIZED HEALTH CARE EDUCATION/TRAINING PROGRAM

## 2025-06-26 PROCEDURE — C1894 INTRO/SHEATH, NON-LASER: HCPCS | Performed by: INTERNAL MEDICINE

## 2025-06-26 PROCEDURE — 25000003 PHARM REV CODE 250: Performed by: NURSE ANESTHETIST, CERTIFIED REGISTERED

## 2025-06-26 PROCEDURE — 02HV33Z INSERTION OF INFUSION DEVICE INTO SUPERIOR VENA CAVA, PERCUTANEOUS APPROACH: ICD-10-PCS | Performed by: INTERNAL MEDICINE

## 2025-06-26 PROCEDURE — 94761 N-INVAS EAR/PLS OXIMETRY MLT: CPT

## 2025-06-26 PROCEDURE — 94640 AIRWAY INHALATION TREATMENT: CPT

## 2025-06-26 PROCEDURE — C1785 PMKR, DUAL, RATE-RESP: HCPCS | Performed by: INTERNAL MEDICINE

## 2025-06-26 PROCEDURE — 93010 ELECTROCARDIOGRAM REPORT: CPT | Mod: ,,, | Performed by: INTERNAL MEDICINE

## 2025-06-26 PROCEDURE — 83735 ASSAY OF MAGNESIUM: CPT | Performed by: NURSE PRACTITIONER

## 2025-06-26 PROCEDURE — 93005 ELECTROCARDIOGRAM TRACING: CPT

## 2025-06-26 PROCEDURE — 63600175 PHARM REV CODE 636 W HCPCS: Performed by: STUDENT IN AN ORGANIZED HEALTH CARE EDUCATION/TRAINING PROGRAM

## 2025-06-26 PROCEDURE — C1898 LEAD, PMKR, OTHER THAN TRANS: HCPCS | Performed by: INTERNAL MEDICINE

## 2025-06-26 PROCEDURE — 02HK3JZ INSERTION OF PACEMAKER LEAD INTO RIGHT VENTRICLE, PERCUTANEOUS APPROACH: ICD-10-PCS | Performed by: INTERNAL MEDICINE

## 2025-06-26 PROCEDURE — 85025 COMPLETE CBC W/AUTO DIFF WBC: CPT | Performed by: NURSE PRACTITIONER

## 2025-06-26 PROCEDURE — C1769 GUIDE WIRE: HCPCS | Performed by: INTERNAL MEDICINE

## 2025-06-26 PROCEDURE — 02H63JZ INSERTION OF PACEMAKER LEAD INTO RIGHT ATRIUM, PERCUTANEOUS APPROACH: ICD-10-PCS | Performed by: INTERNAL MEDICINE

## 2025-06-26 PROCEDURE — C1892 INTRO/SHEATH,FIXED,PEEL-AWAY: HCPCS | Performed by: INTERNAL MEDICINE

## 2025-06-26 PROCEDURE — 25500020 PHARM REV CODE 255: Performed by: NURSE ANESTHETIST, CERTIFIED REGISTERED

## 2025-06-26 PROCEDURE — 99239 HOSP IP/OBS DSCHRG MGMT >30: CPT | Mod: ,,, | Performed by: INTERNAL MEDICINE

## 2025-06-26 PROCEDURE — 99233 SBSQ HOSP IP/OBS HIGH 50: CPT | Mod: ,,, | Performed by: INTERNAL MEDICINE

## 2025-06-26 PROCEDURE — 80053 COMPREHEN METABOLIC PANEL: CPT | Performed by: NURSE PRACTITIONER

## 2025-06-26 PROCEDURE — 63600175 PHARM REV CODE 636 W HCPCS

## 2025-06-26 PROCEDURE — 93010 ELECTROCARDIOGRAM REPORT: CPT | Mod: ,,, | Performed by: STUDENT IN AN ORGANIZED HEALTH CARE EDUCATION/TRAINING PROGRAM

## 2025-06-26 PROCEDURE — 25000242 PHARM REV CODE 250 ALT 637 W/ HCPCS

## 2025-06-26 DEVICE — IMPLANTABLE DEVICE
Type: IMPLANTABLE DEVICE | Site: HEART | Status: FUNCTIONAL
Brand: AMVIA EDGE DR-T

## 2025-06-26 DEVICE — IMPLANTABLE DEVICE
Type: IMPLANTABLE DEVICE | Site: HEART | Status: FUNCTIONAL
Brand: SOLIA S 53

## 2025-06-26 DEVICE — IMPLANTABLE DEVICE
Type: IMPLANTABLE DEVICE | Site: HEART | Status: FUNCTIONAL
Brand: SOLIA S 60

## 2025-06-26 RX ORDER — MIDAZOLAM HYDROCHLORIDE 1 MG/ML
INJECTION INTRAMUSCULAR; INTRAVENOUS
Status: DISCONTINUED | OUTPATIENT
Start: 2025-06-26 | End: 2025-06-26

## 2025-06-26 RX ORDER — DIPHENHYDRAMINE HYDROCHLORIDE 50 MG/ML
50 INJECTION, SOLUTION INTRAMUSCULAR; INTRAVENOUS ONCE
Status: COMPLETED | OUTPATIENT
Start: 2025-06-26 | End: 2025-06-26

## 2025-06-26 RX ORDER — LIDOCAINE HYDROCHLORIDE 20 MG/ML
INJECTION, SOLUTION INFILTRATION; PERINEURAL
Status: DISCONTINUED | OUTPATIENT
Start: 2025-06-26 | End: 2025-06-26

## 2025-06-26 RX ORDER — LORAZEPAM 2 MG/ML
1 INJECTION INTRAMUSCULAR ONCE
Status: COMPLETED | OUTPATIENT
Start: 2025-06-26 | End: 2025-06-26

## 2025-06-26 RX ORDER — DOXYCYCLINE HYCLATE 100 MG
100 TABLET ORAL EVERY 12 HOURS
Qty: 10 TABLET | Refills: 0 | Status: SHIPPED | OUTPATIENT
Start: 2025-06-26

## 2025-06-26 RX ORDER — CEFAZOLIN SODIUM 1 G/3ML
INJECTION, POWDER, FOR SOLUTION INTRAMUSCULAR; INTRAVENOUS
Status: DISCONTINUED | OUTPATIENT
Start: 2025-06-26 | End: 2025-06-26

## 2025-06-26 RX ORDER — PROPOFOL 10 MG/ML
VIAL (ML) INTRAVENOUS CONTINUOUS PRN
Status: DISCONTINUED | OUTPATIENT
Start: 2025-06-26 | End: 2025-06-26

## 2025-06-26 RX ORDER — FENTANYL CITRATE 50 UG/ML
INJECTION, SOLUTION INTRAMUSCULAR; INTRAVENOUS
Status: DISCONTINUED | OUTPATIENT
Start: 2025-06-26 | End: 2025-06-26

## 2025-06-26 RX ORDER — BUPIVACAINE HYDROCHLORIDE 2.5 MG/ML
INJECTION, SOLUTION EPIDURAL; INFILTRATION; INTRACAUDAL; PERINEURAL
Status: DISCONTINUED | OUTPATIENT
Start: 2025-06-26 | End: 2025-06-26

## 2025-06-26 RX ORDER — LIDOCAINE HYDROCHLORIDE 10 MG/ML
5 INJECTION, SOLUTION EPIDURAL; INFILTRATION; INTRACAUDAL; PERINEURAL ONCE
Status: COMPLETED | OUTPATIENT
Start: 2025-06-26 | End: 2025-06-26

## 2025-06-26 RX ORDER — ALBUTEROL SULFATE 2.5 MG/.5ML
10 SOLUTION RESPIRATORY (INHALATION) ONCE
Status: COMPLETED | OUTPATIENT
Start: 2025-06-26 | End: 2025-06-26

## 2025-06-26 RX ORDER — ATROPINE SULFATE 0.1 MG/ML
INJECTION INTRAVENOUS
Status: COMPLETED
Start: 2025-06-26 | End: 2025-06-26

## 2025-06-26 RX ORDER — SODIUM CHLORIDE 9 MG/ML
INJECTION, SOLUTION INTRAVENOUS CONTINUOUS
Status: DISCONTINUED | OUTPATIENT
Start: 2025-06-26 | End: 2025-06-26 | Stop reason: HOSPADM

## 2025-06-26 RX ORDER — DOXYCYCLINE HYCLATE 100 MG
100 TABLET ORAL EVERY 12 HOURS
Status: DISCONTINUED | OUTPATIENT
Start: 2025-06-26 | End: 2025-06-26 | Stop reason: HOSPADM

## 2025-06-26 RX ORDER — SODIUM CHLORIDE 0.9 G/100ML
INJECTION, SOLUTION IRRIGATION
Status: DISCONTINUED | OUTPATIENT
Start: 2025-06-26 | End: 2025-06-26

## 2025-06-26 RX ORDER — FAMOTIDINE 10 MG/ML
20 INJECTION, SOLUTION INTRAVENOUS ONCE
Status: COMPLETED | OUTPATIENT
Start: 2025-06-26 | End: 2025-06-26

## 2025-06-26 RX ORDER — LORAZEPAM 0.5 MG/1
1 TABLET ORAL EVERY 6 HOURS PRN
Status: DISCONTINUED | OUTPATIENT
Start: 2025-06-26 | End: 2025-06-26

## 2025-06-26 RX ORDER — VANCOMYCIN HYDROCHLORIDE 1 G/20ML
INJECTION, POWDER, LYOPHILIZED, FOR SOLUTION INTRAVENOUS
Status: DISCONTINUED | OUTPATIENT
Start: 2025-06-26 | End: 2025-06-26

## 2025-06-26 RX ADMIN — FENTANYL CITRATE 50 MCG: 50 INJECTION, SOLUTION INTRAMUSCULAR; INTRAVENOUS at 09:06

## 2025-06-26 RX ADMIN — FAMOTIDINE 20 MG: 20 TABLET, FILM COATED ORAL at 03:06

## 2025-06-26 RX ADMIN — LIDOCAINE HYDROCHLORIDE 50 MG: 10 INJECTION, SOLUTION EPIDURAL; INFILTRATION; INTRACAUDAL; PERINEURAL at 05:06

## 2025-06-26 RX ADMIN — DIPHENHYDRAMINE HYDROCHLORIDE 50 MG: 50 INJECTION, SOLUTION INTRAMUSCULAR; INTRAVENOUS at 08:06

## 2025-06-26 RX ADMIN — ALBUTEROL SULFATE 10 MG: 2.5 SOLUTION RESPIRATORY (INHALATION) at 07:06

## 2025-06-26 RX ADMIN — LEVOTHYROXINE SODIUM 100 MCG: 100 TABLET ORAL at 06:06

## 2025-06-26 RX ADMIN — LORAZEPAM 1 MG: 2 INJECTION INTRAMUSCULAR; INTRAVENOUS at 05:06

## 2025-06-26 RX ADMIN — FAMOTIDINE 20 MG: 10 INJECTION, SOLUTION INTRAVENOUS at 08:06

## 2025-06-26 RX ADMIN — IOHEXOL 10 ML: 350 INJECTION, SOLUTION INTRAVENOUS at 09:06

## 2025-06-26 RX ADMIN — METHYLPREDNISOLONE SODIUM SUCCINATE 125 MG: 40 INJECTION, POWDER, FOR SOLUTION INTRAMUSCULAR; INTRAVENOUS at 08:06

## 2025-06-26 RX ADMIN — PHENYLEPHRINE HYDROCHLORIDE 0.5 MCG/KG/MIN: 10 INJECTION INTRAVENOUS at 10:06

## 2025-06-26 RX ADMIN — DIPHENHYDRAMINE HYDROCHLORIDE 50 MG: 25 CAPSULE ORAL at 03:06

## 2025-06-26 RX ADMIN — SODIUM CHLORIDE: 0.9 INJECTION, SOLUTION INTRAVENOUS at 09:06

## 2025-06-26 RX ADMIN — PROPOFOL 150 MCG/KG/MIN: 10 INJECTION, EMULSION INTRAVENOUS at 09:06

## 2025-06-26 RX ADMIN — PREDNISONE 50 MG: 5 TABLET ORAL at 03:06

## 2025-06-26 RX ADMIN — SODIUM CHLORIDE: 0.9 INJECTION, SOLUTION INTRAVENOUS at 06:06

## 2025-06-26 RX ADMIN — MIDAZOLAM HYDROCHLORIDE 2 MG: 2 INJECTION, SOLUTION INTRAMUSCULAR; INTRAVENOUS at 09:06

## 2025-06-26 RX ADMIN — SODIUM ZIRCONIUM CYCLOSILICATE 5 G: 5 POWDER, FOR SUSPENSION ORAL at 07:06

## 2025-06-26 RX ADMIN — CEFAZOLIN 2 G: 330 INJECTION, POWDER, FOR SOLUTION INTRAMUSCULAR; INTRAVENOUS at 09:06

## 2025-06-26 NOTE — NURSING
Patient placed on portable cardiac monitoring with significant other at bedside, pt transported to Cath Lab. Handoff report given to BRODERICK Portillo. Plan of care updated.

## 2025-06-26 NOTE — EICU
[facility] Night Rounds Checklist  24H Vital Sign Range:  Temp:  [97.9 °F (36.6 °C)-98.5 °F (36.9 °C)]   Pulse:  [43-77]   Resp:  [10-34]   BP: ()/(54-73)   SpO2:  [94 %-99 %]     Video rounds and LDA reconciliation

## 2025-06-26 NOTE — ASSESSMENT & PLAN NOTE
Patient admitted for symptomatic bradycardia and found to have 2:1 AV block with intermittent CHB with junctional escape in the 40's. He has important history o chest radiation which could contribute as etiology of his evy-arrhythmia, however given his age and CHB, would like to rule out infiltrative diseases, specifically sarcoidosis, before implanting a device.   MRI negative for sacoidosis        Plan:  - Dual-chamber PPM with LBBA pacing today

## 2025-06-26 NOTE — CARE UPDATE
Interval Events:  - patient pending PPM tomorrow  - patient has been predominantly with CHB with junctional escape in the 40's, overnight became more bradycardic with rates sitting at 30, occasionally lower with slower junctional escape  - patient reports feeling off while rates lower  - discussed care with EP fellow, recommended TVP given concern for worsening symptomatic bradycardia and potential for destabilization during anesthesia prior to procedure.    Temp:  [97.8 °F (36.6 °C)-98.3 °F (36.8 °C)] 97.8 °F (36.6 °C)  Pulse:  [15-78] 58  Resp:  [10-31] 21  SpO2:  [92 %-99 %] 96 %  BP: ()/(53-72) 107/64     Assessment and Plan:  Patient with CHB and junctional escape sustaining around 30.    Plan:  - TVP placed, rate set to 30 to promote native conduction  - threshold 0.6    Michael Hart MD MPH  Cardiovascular Disease Fellow PGY-4

## 2025-06-26 NOTE — PLAN OF CARE
[facility]  Initial Discharge Assessment       Primary Care Provider: Brittany Neves MD    Admission Diagnosis: Bradycardia [R00.1]  Dyspnea [R06.00]  Dyspnea on exertion [R06.09]  Symptomatic bradycardia [R00.1]  Chest pain [R07.9]    Admission Date: 6/24/2025  Expected Discharge Date: 6/26/2025    Transition of Care Barriers: None    Payor: MEDICAID / [facility] / Product Type: Managed Medicaid /     Extended Emergency Contact Information  Primary Emergency Contact: CORIN BRAUN  Mobile Phone: [phone]  Relation: Significant other  Preferred language: English   needed? No  Secondary Emergency Contact: Michael Hart  Mobile Phone: [phone]  Relation: Father    Discharge Plan A: Home with family  Discharge Plan B: Home      [id number] - [place], [place] - [address]  [address]  [facility] [address]  Phone: [phone] Fax: [phone]    [id number] - [place], [place] - [address] [facility]  [address]  [facility] [address]  Phone: [phone] Fax: [phone]    [id number] - [place], [place] - [address]  [address]  [facility] [place] [address]  Phone: [phone] Fax: [phone]      Initial Assessment (most recent)       Adult Discharge Assessment - 06/26/25 1306          Discharge Assessment    Assessment Type Discharge Planning Assessment     Confirmed/corrected address, phone number and insurance Yes     Confirmed Demographics Correct on Facesheet     Source of Information patient     Communicated NINO with patient/caregiver Yes     People in Home significant other     Name(s) of People in Home CORIN BRAUN (Significant other)  [phone] (Mobile)     Do you expect to return to your current living situation? Yes     Do you have help at home or someone to help you manage your care at home? Yes     Who are your caregiver(s) and  their phone number(s)? CORIN BRAUN (Significant other)  [phone] (Mobile)     Prior to hospitilization cognitive status: Alert/Oriented;No Deficits     Current cognitive status: Alert/Oriented;No Deficits     Walking or Climbing Stairs Difficulty no     Dressing/Bathing Difficulty no     Home Accessibility not wheelchair accessible     Home Layout Able to live on 1st floor     Equipment Currently Used at Home none     Readmission within 30 days? No     Patient currently being followed by outpatient case management? No     Do you currently have service(s) that help you manage your care at home? No     Do you take prescription medications? Yes     Do you have prescription coverage? Yes     Do you have any problems affording any of your prescribed medications? No     Is the patient taking medications as prescribed? yes     Who is going to help you get home at discharge? CORIN BRAUN (Significant other)  [phone] (Mobile)     How do you get to doctors appointments? car, drives self     Are you on dialysis? No     Do you take coumadin? No     Discharge Plan A Home with family     Discharge Plan B Home     DME Needed Upon Discharge  none     Discharge Plan discussed with: Spouse/sig other     Name(s) and Number(s) CORIN BRAUN (Significant other)  [phone] (Mobile)     Transition of Care Barriers None        Physical Activity    On average, how many days per week do you engage in moderate to strenuous exercise (like a brisk walk)? 7 days     On average, how many minutes do you engage in exercise at this level? 60 min        Financial Resource Strain    How hard is it for you to pay for the very basics like food, housing, medical care, and heating? Not hard at all        Housing Stability    In the last 12 months, was there a time when you were not able to pay the mortgage or rent on time? No     At any time in the past 12 months, were you homeless or living in a shelter (including now)? No         Transportation Needs    In the past 12 months, has lack of transportation kept you from medical appointments or from getting medications? No     In the past 12 months, has lack of transportation kept you from meetings, work, or from getting things needed for daily living? No        Food Insecurity    Within the past 12 months, you worried that your food would run out before you got the money to buy more. Never true     Within the past 12 months, the food you bought just didn't last and you didn't have money to get more. Never true        Stress    Do you feel stress - tense, restless, nervous, or anxious, or unable to sleep at night because your mind is troubled all the time - these days? Not at all        Social Isolation    How often do you feel lonely or isolated from those around you?  Never        Alcohol Use    Q1: How often do you have a drink containing alcohol? Never     Q2: How many drinks containing alcohol do you have on a typical day when you are drinking? Patient does not drink     Q3: How often do you have six or more drinks on one occasion? Never        [employer]Factor    In the past 12 months has the electric, gas, oil, or water company threatened to shut off services in your home? No        Health Literacy    How often do you need to have someone help you when you read instructions, pamphlets, or other written material from your doctor or pharmacy? Never                   CM spoke with patient in Room at bedside. All information was verified on facesheet. Patient lives in a 1 story house with significant other, 8 steps to get inside with no pets. Patient states no assistance is needed. Patient can ambulate, drive, run errands, and complete ADL's independently. Patient does not use any DME or any in-home assistive equipment. Patient has not used Home Health previously. Patient is not on dialysis nor use Coumadin as a blood thinner. Patient takes medication as prescribed and has resources for all  prescriptive needs. Patient will have help from family member upon discharge. Family will provide transportation home. All Questions and concerns addressed and whiteboard updated with CM contact information. Will continue to follow for course of hospitalization.      Discharge Plan A and Plan B have been determined by review of patient's clinical status, future medical and therapeutic needs, and coverage/benefits for post-acute care in coordination with multidisciplinary team members.     Duane Cole RN, BSN  Case Management  [phone]

## 2025-06-26 NOTE — PLAN OF CARE
Problem: Adult Inpatient Plan of Care  Goal: Plan of Care Review  Outcome: Progressing     Problem: Wound  Goal: Optimal Functional Ability  Outcome: Progressing

## 2025-06-26 NOTE — DISCHARGE SUMMARY
"[facility]  Cardiology  Discharge Summary      Patient Name: Michael Hart  MRN: [id number]  Admission Date: 6/24/2025  Hospital Length of Stay: 2 days  Discharge Date and Time: 06/26/2025 12:46 PM  Attending Physician: Kinjal Chinchilla MD    Discharging Provider: Edmund Andujar DO  Primary Care Physician: Brittany Neves MD    HPI:   Mr. 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 Epicarditis post NHL resection, aortic stenosis, hypothyroidism, and atrial flutter s/p ablation (1/2025) who presented with SOB and was admitted for 2:1 AV block.     Patient initially admitted at [facility]. He presented with SOB and chest discomfort for several days. He noticed at home his HR would get as low as the 20's on his home BP machine and that he had GIL and dizziness with activity. He denies recent infectious symptoms. He is followed by Dr Paul at [facility]. Upon arrival to the ED, patient's HR 40s and blood pressure 119/70. EKG showed 2:1 AV block.  CBC unremarkable, CMP unremarkable. troponin within normal limits.  TSH 0.741.  Repeat TTE performed showed systolic function with a visually estimated ejection fraction of 55 - 70%. Global longitudinal strain is reduced; 13.3%.  Cardiology consulted in the ED and patient accepted for transfer at [facility] to have pacemaker placed.     On arrival to [facility] main [place] patient reported overall feeling pretty well. He did not he had the sensation of substernal "rubbing" chest pain which was the same he had gotten years ago and was told was pericarditis. He had his pericardium stripped during surgery by Dr. John Ochsner, however it was felt he had either pericardial remnant pericarditis or epicarditis which was treated in the past with steroids to good effect. ECG here showed 2:1 with narrow escape however on review of telemetry after arrival patient having periods of 3rd degree heart block with " narrow escape prompting transfer to ICU for closer monitor.     Procedure(s) (LRB):  INSERTION, CARDIAC PACEMAKER, DUAL CHAMBER (N/A)     Indwelling Lines/Drains at time of discharge:  Lines/Drains/Airways       Central Venous Catheter Line  Duration             Introducer 06/26/25 0554 Internal Jugular Right <1 day              Line  Duration                  Pacer Wires 06/26/25 0555 <1 day                    Hospital Course:  45 year old man with prior NHL (s/p surgical resection, chemo, XRT), residual pericarditis vs. Epicarditis post NHL resection, aortic stenosis, hypothyroidism, and atrial flutter s/p ablation (1/2025) who presented with SOB and was admitted for 2:1 AV block. Patient was admitted for PPM placement evaluation/implantation. Echo revealed a preserved EF. Due to conduction system abnormality at a young age, we obtained a cardiac MRI to evaluate for infiltrative cardiomyopathy. It was negative. Patient underwent PPM Dual with LBBA pacing placement on 6/26/25 with no complications. He will be discharged with precautions for sling use and follow up in device clinic and EP. Will also be discharged with doxycycline for a completion of a 5 day course for post op Abx.     Goals of Care Treatment Preferences:  Code Status: Full Code    Physical Exam  HENT:      Mouth/Throat:      Mouth: Mucous membranes are moist.   Cardiovascular:      Rate and Rhythm: Regular rhythm.      Pulses: Normal pulses.   Pulmonary:      Effort: Pulmonary effort is normal.   Skin:     General: Skin is warm.      Capillary Refill: Capillary refill takes less than 2 seconds.   Neurological:      Mental Status: He is alert.      Consults:   Consults (From admission, onward)          Status Ordering Provider     Inpatient consult to Electrophysiology  Once        Provider:  (Not yet assigned)    PEDRO LUIS Zambrano            Significant Diagnostic Studies: N/A    Pending Diagnostic Studies:       Procedure Component Value  Units Date/Time    Basic metabolic panel [[id number]]     Order Status: Sent Lab Status: No result     Specimen: Blood     EKG 12-LEAD [[id number]]     Order Status: Sent Lab Status: No result     EKG 12-lead [[id number]]     Order Status: Sent Lab Status: No result     EKG 12-lead [[id number]]     Order Status: Sent Lab Status: No result     X-Ray Chest AP Portable [[id number]] Resulted: 06/26/25 1245    Order Status: Sent Lab Status: In process Updated: 06/26/25 1245            Final Active Diagnoses:    Diagnosis Date Noted POA    PRINCIPAL PROBLEM:  Complete heart block [I44.2] 06/24/2025 Yes    Attention deficit hyperactivity disorder, combined type [F90.2] 09/12/2024 Yes    Hypercholesterolemia [E78.00] 01/11/2024 Yes    Postablative hypothyroidism [E89.0] 01/11/2024 Yes      Problems Resolved During this Admission:    Diagnosis Date Noted Date Resolved POA    Hodgkin lymphoma [C81.90] 1998 07/10/2024 Yes     No new Assessment & Plan notes have been filed under this hospital service since the last note was generated.  Service: Cardiology      Discharged Condition: stable    Disposition: Home or Self Care    Follow Up:    Patient Instructions:   No discharge procedures on file.  Medications:  Reconciled Home Medications:      Medication List        START taking these medications      doxycycline 100 MG tablet  Commonly known as: VIBRA-TABS  Take 1 tablet (100 mg total) by mouth every 12 (twelve) hours.            CHANGE how you take these medications      atorvastatin 10 MG tablet  Commonly known as: LIPITOR  Take 1 tablet (10 mg total) by mouth once daily.  What changed: additional instructions            CONTINUE taking these medications      aspirin 81 MG EC tablet  Commonly known as: ECOTRIN  Take 1 tablet (81 mg total) by mouth once daily.     dextroamphetamine-amphetamine 20 mg tablet  Commonly known as: ADDERALL  Take 1 tablet by mouth once daily.     hydrocortisone-pramoxine rectal foam  Commonly known  as: PROCTOFOAM-HS  Place 1 applicator rectally 2 (two) times daily.     levothyroxine 100 MCG tablet  Commonly known as: SYNTHROID  TAKE 1 TABLET BY MOUTH EVERY DAY              Time spent on the discharge of patient: 40 minutes    Edmund Andujar DO  Cardiology  [facility]

## 2025-06-26 NOTE — NURSING
Patient returned back to room, VSS, pt drowsy, following all commands. Significant other at bedside, updated on patient status. CCU team notified of patient's return.

## 2025-06-26 NOTE — NURSING
Spoke with Lindsey RN in the Cath Lab, report given, discussed pre-meds to be given, new orders pending. Chest clipped from clavicle to nipple per Cath Lab request. CHG bath given. Significant other- Katty- at bedside and updated on pt status. Awaiting transport.

## 2025-06-26 NOTE — PROCEDURES
"Michael Hart is a 45 y.o. male patient.    Temp: 97.8 °F (36.6 °C) (06/26/25 0300)  Pulse: (!) 58 (06/26/25 0545)  Resp: (!) 21 (06/26/25 0545)  BP: 107/64 (06/26/25 0545)  SpO2: 96 % (06/26/25 0545)  Weight: 92.1 kg (203 lb 0.7 oz) (06/25/25 0119)  Height: 6' 2" (188 cm) (06/24/25 2230)       Central Line    Date/Time: 6/26/2025 7:01 AM    Performed by: Michael Hart MD  Authorized by: Michael Hart MD    Location procedure was performed:  [facility] SURGICAL ICU (SICU)  Consent Done ?:  Yes  Indications:  Vascular access  Anesthesia:  Local infiltration  Local anesthetic:  Lidocaine 1% without epinephrine  Preparation:  Skin prepped with ChloraPrep  Skin prep agent dried: Skin prep agent completely dried prior to procedure    Sterile barriers: All five maximal sterile barriers used - gloves, gown, cap, mask and large sterile sheet    Hand hygiene: Hand hygiene performed immediately prior to central venous catheter insertion    Location:  Right internal jugular  Catheter type:  Cordis  Catheter size:  6.5 Fr  Ultrasound guidance: Yes    Vessel Caliber:  Medium   patent  Comprressibility:  Normal  Needle advanced into vessel with real time ultrasound guidance.    Guidewire confirmed in vessel.    Steril sheath on probe.    Sterile gel used.  Manometry: No    Number of attempts:  1  Securement:  Line sutured, blood return through all ports, chlorhexidine patch and sterile dressing applied  Complications: No    Specimens: No    Guidewire: guidewire removed intact, verified with nurse    XRay:  Placement verified by x-ray  Adverse Events:  None  Other Complications:  - TVP inserted into right ventricle   - TVP inserted into right ventricleTermination Site: superior vena cava      6/26/2025    "

## 2025-06-26 NOTE — NURSING
Patient's HR dropped to 15 bpm, increased to low 30s following, airstrip saved. /54 (78), patient not complaining of SOB, lightheadedness, dizziness, or any other s/s. MD Tanner notified.     0500: MD Tanner at bedside. Consent obtained for TVP placement at bedside.

## 2025-06-26 NOTE — EICU
Virtual ICU Quality Rounds    Admit Date: 6/24/2025  Hospital Day: 2    ICU Day: 1d 6h    24H Vital Sign Range:  Temp:  [97.8 °F (36.6 °C)-98.3 °F (36.8 °C)]   Pulse:  [15-78]   Resp:  [10-31]   BP: ()/(53-72)   SpO2:  [92 %-99 %]     VICU Surveillance Screening    Daily review of  line necessity(optional): Central line(s) in place    Central line type (optional): Cordis    Central line indications : pacer wires        LDA reconciliation : Yes        Central line best practices : Consider removal if patient has no central line indications for over 24hrs and Consider removal patient has a new fever and/or central line is over 7 days old

## 2025-06-26 NOTE — TRANSFER OF CARE
"Anesthesia Transfer of Care Note    Patient: Michael Hart    Procedure(s) Performed: Procedure(s) (LRB):  INSERTION, CARDIAC PACEMAKER, DUAL CHAMBER (N/A)    Patient location: ICU    Anesthesia Type: general    Transport from OR: Transported from OR on room air with adequate spontaneous ventilation. Continuous ECG monitoring in transport. Continuous SpO2 monitoring in transport    Post pain: adequate analgesia    Post assessment: no apparent anesthetic complications and tolerated procedure well    Post vital signs: stable    Level of consciousness: awake    Nausea/Vomiting: no nausea/vomiting    Complications: none    Transfer of care protocol was followed      Last vitals: Visit Vitals  BP (!) 113/56   Pulse (!) 53   Temp 36.4 °C (97.5 °F) (Axillary)   Resp 20   Ht 6' 2" (1.88 m)   Wt 92.1 kg (203 lb 0.7 oz)   SpO2 (!) 94%   BMI 26.07 kg/m²     "

## 2025-06-26 NOTE — PROGRESS NOTES
[facility]  Cardiac Electrophysiology  Progress Note    Admission Date: 6/24/2025  Code Status: Full Code   Attending Physician: Kinjal Chinchilla MD   Expected Discharge Date: 6/30/2025  Principal Problem:Complete heart block    Subjective:     Interval History: Patient HR decreased to 15 bpm and persistent CHB, hence TVP was placed. Patient will undergo PPM insertion today    ROS  Objective:     Vital Signs (Most Recent):  Temp: 97.5 °F (36.4 °C) (06/26/25 0701)  Pulse: (!) 34 (06/26/25 0800)  Resp: (!) 25 (06/26/25 0800)  BP: (!) 109/58 (06/26/25 0800)  SpO2: 98 % (06/26/25 0800) Vital Signs (24h Range):  Temp:  [97.5 °F (36.4 °C)-98.3 °F (36.8 °C)] 97.5 °F (36.4 °C)  Pulse:  [15-78] 34  Resp:  [10-31] 25  SpO2:  [92 %-99 %] 98 %  BP: ()/(53-72) 109/58     Weight: 92.1 kg (203 lb 0.7 oz)  Body mass index is 26.07 kg/m².     SpO2: 98 %        Physical Exam  HENT:      Mouth/Throat:      Mouth: Mucous membranes are moist.   Cardiovascular:      Rate and Rhythm: Regular rhythm. Bradycardia present.      Pulses: Normal pulses.   Pulmonary:      Effort: Pulmonary effort is normal.   Skin:     General: Skin is warm.      Capillary Refill: Capillary refill takes less than 2 seconds.   Neurological:      Mental Status: He is alert.            Significant Labs:   Recent Lab Results         06/26/25  0408   06/26/25  0406        Albumin   3.6       ALP   60       ALT   16       Anion Gap   8       AST   13       Baso #   0.04       Basophil %   0.5       BILIRUBIN TOTAL   0.4  Comment: For infants and newborns, interpretation of results should be based   on gestational age, weight and in agreement with clinical   observations.    Premature Infant recommended reference ranges:   0-24 hours:  <8.0 mg/dL   24-48 hours: <12.0 mg/dL   3-5 days:    <15.0 mg/dL   6-29 days:   <15.0 mg/dL       BUN   14       Calcium   8.9       Chloride   106       CO2   20       Creatinine   1.0       eGFR    >60  Comment: Estimated GFR calculated using the CKD-EPI creatinine (2021) equation.       Eos #   0.05       Eos %   0.6       Glucose   177       Gran # (ANC)   7.87       Hematocrit   43.8       Hemoglobin   15.0       Immature Grans (Abs)   0.03  Comment: Mild elevation in immature granulocytes is non specific and can be seen in a variety of conditions including stress response, acute inflammation, trauma and pregnancy. Correlation with other laboratory and clinical findings is essential.       Immature Granulocytes   0.3       Lymph #   0.66       Lymph %   7.5       Magnesium    2.1       MCH   30.4       MCHC   34.2       MCV   89       Mono #   0.17       Mono %   1.9       MPV   10.9       Neut %   89.2       nRBC   0       Platelet Count   173       POCT Glucose 179         Potassium   5.4  Comment: *No Visible Hemolysis       PROTEIN TOTAL   6.4       RBC   4.93       RDW   11.9       Sodium   134       WBC   8.82               Assessment and Plan:     * Complete heart block  Patient admitted for symptomatic bradycardia and found to have 2:1 AV block with intermittent CHB with junctional escape in the 40's. He has important history o chest radiation which could contribute as etiology of his evy-arrhythmia, however given his age and CHB, would like to rule out infiltrative diseases, specifically sarcoidosis, before implanting a device.   MRI negative for sacoidosis        Plan:  - Dual-chamber PPM with LBBA pacing today           Doug Pierce MD  Cardiac Electrophysiology  [facility]

## 2025-06-26 NOTE — DISCHARGE INSTRUCTIONS
Medication instructions:    Complete your 5 days of antibiotics  If you are on a blood thinner (examples: apixiban [Eliquis], rivaroxaban [Xarelto], dabigatran [Pradaxa], or enoxaparin [Lovenox]), do not take your blood thinner for the next 5 days after your procedure. You can resume after 5 days post-procedure (i.e., when you complete your antibiotics). If you are on Coumadin you can continue to take it the day of your procedure  Pain control: you can take up to Tylenol 1000 mg every 6 hours as needed or (if you do not have kidney disease) ibuprofen 600 mg every 6 hours as needed as needed for pain control (if you do not have kidney disease). If unsure, please contact your primary care physician for recommendations.      Activity restrictions & precautions:    Sling & arm instructions:  Wear your sling for 48 hours. After 48 hours, you can take your arm out of your sling.   You must wear your sling at night when you sleep for 6 weeks after your procedure  Do not lift >5 lbs for 2 weeks.  Do not raise your elbow above your shoulder on the same side as your device for 6 weeks.   No driving for 1 week if the pacemaker is the opposite side of your dominant arm. No driving for 4 weeks if the pacemaker is the same side as your dominant arm.    Surgical site   Dressing (see images below)  **Note: these are general rules to follow unless instructed otherwise** - see images below  If you have a brown rectangular gel bandage (A.K.A. Aquacel Ag dressing) over your surgical incision do not remove it. This will be removed at your device clinic follow up appointment in 1 week.  If you have a square light brown bandage (A.K.A Mepilex dressing) you should remove in 48 hours after your procedure.  If you have a pressure dressing (white elastic tape with gauze underneath or a very large bandage that covers your left chest and is shaped like a triangle) over your surgical site, this should be removed the morning after your procedure  unless instructed otherwise.  Do not place any creams or ointments over the surgical site. This could effect the integrity of the Dermabond glue.  You can shower after 24 hours post-procedure, but do not let the jet directly hit your pocket site for at least 2 weeks. Recommend showering with your back to the shower head.   Do not reach your arm (on the same side as your device) around your back during showering for 6 weeks.  Do not submerge your surgical incision site under water (swimming, bathing if you submerge the actual surgical site) for at least 6 week. It is imperative that the surgical site is completely healed prior to doing so    Follow up in device clinic in 1 week to check your incision and device function  Please contact the electrophysiology clinic if you have any questions or if you experience: potential surgical/pocket site complications (pain, swelling, bleeding, drainage), fevers, chest pain/shortness of breath, or for any other concerns.

## 2025-06-26 NOTE — PLAN OF CARE
SICU PLAN OF CARE    Dx: Complete heart block    Goals of Care: Pacemaker placement scheduled for today.     Vital Signs (last 12 hours):   Temp:  [97.8 °F (36.6 °C)-98.1 °F (36.7 °C)]   Pulse:  [15-78]   Resp:  [13-31]   BP: (105-137)/(53-67)   SpO2:  [92 %-98 %]      Neuro: AAOx4, Follows commands , and Moves all extremities spontaneously     Cardiac: Rhythm: sinus bradycardia    Respiratory: Room Air    Urine Output: Voids Spontaneously  1325 mL/shift    Diet: NPO      Labs/Accuchecks: am labs    Skin:  No breakdown noted.  Patient turned q2h, bony prominences protected, and mattress inflated/working correctly.     Shift Events:  TVP placed at bedside after patient's HR sustaining in the low 30s. Made NPO at midnight for pacemaker today. K 5.4, MD Tanner aware.   See flowsheet for further assessment/details.  Family updated on current condition/plan of care, questions answered, and emotional support provided.  MD updated on current condition, vitals, labs, and gtts.

## 2025-06-26 NOTE — PLAN OF CARE
[facility] Intensive Care  Discharge Final Note    Primary Care Provider: Brittany Neves MD    Expected Discharge Date: 6/26/2025    Patient discharged to home via family transportation.     Patient's bedside nurse provided discharge instructions.    Discharge Plan A and Plan B have been determined by review of patient's clinical status, future medical and therapeutic needs, and coverage/benefits for post-acute care in coordination with multidisciplinary team members.        Final Discharge Note (most recent)       Final Note - 06/26/25 1306          Final Note    Assessment Type Discharge Planning Assessment                     Important Message from Medicare                 Future Appointments   Date Time Provider Department Center   7/22/2025 11:00 AM MARCEL Sahu MD [facility] scheduled post-discharge follow-up appointment and information added to AVS.     Duane Cole, RN, BSN  Case Management  [phone]

## 2025-06-26 NOTE — HOSPITAL COURSE
45 year old man with prior NHL (s/p surgical resection, chemo, XRT), residual pericarditis vs. Epicarditis post NHL resection, aortic stenosis, hypothyroidism, and atrial flutter s/p ablation (1/2025) who presented with SOB and was admitted for 2:1 AV block. Patient was admitted for PPM placement evaluation/implantation. Echo revealed a preserved EF. Due to conduction system abnormality at a young age, we obtained a cardiac MRI to evaluate for infiltrative cardiomyopathy. It was negative. Patient underwent PPM Dual with LBBA pacing placement on 6/26/25 with no complications. He will be discharged with precautions for sling use and follow up in device clinic and EP. Will also be discharged with doxycycline for a completion of a 5 day course for post op Abx.

## 2025-06-26 NOTE — SUBJECTIVE & OBJECTIVE
Interval History: Patient HR decreased to 15 bpm and persistent CHB, hence TVP was placed. Patient will undergo PPM insertion today    ROS  Objective:     Vital Signs (Most Recent):  Temp: 97.5 °F (36.4 °C) (06/26/25 0701)  Pulse: (!) 34 (06/26/25 0800)  Resp: (!) 25 (06/26/25 0800)  BP: (!) 109/58 (06/26/25 0800)  SpO2: 98 % (06/26/25 0800) Vital Signs (24h Range):  Temp:  [97.5 °F (36.4 °C)-98.3 °F (36.8 °C)] 97.5 °F (36.4 °C)  Pulse:  [15-78] 34  Resp:  [10-31] 25  SpO2:  [92 %-99 %] 98 %  BP: ()/(53-72) 109/58     Weight: 92.1 kg (203 lb 0.7 oz)  Body mass index is 26.07 kg/m².     SpO2: 98 %        Physical Exam  HENT:      Mouth/Throat:      Mouth: Mucous membranes are moist.   Cardiovascular:      Rate and Rhythm: Regular rhythm. Bradycardia present.      Pulses: Normal pulses.   Pulmonary:      Effort: Pulmonary effort is normal.   Skin:     General: Skin is warm.      Capillary Refill: Capillary refill takes less than 2 seconds.   Neurological:      Mental Status: He is alert.            Significant Labs:   Recent Lab Results         06/26/25  0408   06/26/25  0406        Albumin   3.6       ALP   60       ALT   16       Anion Gap   8       AST   13       Baso #   0.04       Basophil %   0.5       BILIRUBIN TOTAL   0.4  Comment: For infants and newborns, interpretation of results should be based   on gestational age, weight and in agreement with clinical   observations.    Premature Infant recommended reference ranges:   0-24 hours:  <8.0 mg/dL   24-48 hours: <12.0 mg/dL   3-5 days:    <15.0 mg/dL   6-29 days:   <15.0 mg/dL       BUN   14       Calcium   8.9       Chloride   106       CO2   20       Creatinine   1.0       eGFR   >60  Comment: Estimated GFR calculated using the CKD-EPI creatinine (2021) equation.       Eos #   0.05       Eos %   0.6       Glucose   177       Gran # (ANC)   7.87       Hematocrit   43.8       Hemoglobin   15.0       Immature Grans (Abs)   0.03  Comment: Mild elevation in  immature granulocytes is non specific and can be seen in a variety of conditions including stress response, acute inflammation, trauma and pregnancy. Correlation with other laboratory and clinical findings is essential.       Immature Granulocytes   0.3       Lymph #   0.66       Lymph %   7.5       Magnesium    2.1       MCH   30.4       MCHC   34.2       MCV   89       Mono #   0.17       Mono %   1.9       MPV   10.9       Neut %   89.2       nRBC   0       Platelet Count   173       POCT Glucose 179         Potassium   5.4  Comment: *No Visible Hemolysis       PROTEIN TOTAL   6.4       RBC   4.93       RDW   11.9       Sodium   134       WBC   8.82

## 2025-06-26 NOTE — EICU
Called to the room for Time out, Procedural surveillance, and Documentation assistance    [x] Verified patient name   [x] Verified patient   [x] Read from armband    Central line and TVP    Procedure checklist: Time out flowsheet complete, CXR ordered, LDA added, and Patient tolerated well

## 2025-06-27 NOTE — ANESTHESIA POSTPROCEDURE EVALUATION
Anesthesia Post Evaluation    Patient: Michael Hart    Procedure(s) Performed: Procedure(s) (LRB):  INSERTION, CARDIAC PACEMAKER, DUAL CHAMBER (N/A)    Final Anesthesia Type: general      Patient location during evaluation: ICU  Patient participation: Yes- Able to Participate  Level of consciousness: awake and alert  Post-procedure vital signs: reviewed and stable  Pain management: adequate  Airway patency: patent    PONV status at discharge: No PONV  Anesthetic complications: no      Cardiovascular status: blood pressure returned to baseline  Respiratory status: unassisted  Hydration status: euvolemic  Follow-up not needed.              Vitals Value Taken Time   /70 06/26/25 14:17   Temp  06/27/25 13:13   Pulse 99 06/26/25 14:17   Resp 24 06/26/25 14:17   SpO2 96 % 06/26/25 14:05   Vitals shown include unfiled device data.      No case tracking events are documented in the log.      Pain/Magdiel Score: No data recorded

## 2025-06-30 DIAGNOSIS — I47.10 SVT (SUPRAVENTRICULAR TACHYCARDIA): Primary | ICD-10-CM

## 2025-07-01 ENCOUNTER — PATIENT MESSAGE (OUTPATIENT)
Dept: CARDIOLOGY | Facility: CLINIC | Age: 45
End: 2025-07-01
Payer: MEDICAID

## 2025-07-03 ENCOUNTER — PATIENT MESSAGE (OUTPATIENT)
Dept: ELECTROPHYSIOLOGY | Facility: CLINIC | Age: 45
End: 2025-07-03
Payer: MEDICAID

## 2025-07-03 ENCOUNTER — CLINICAL SUPPORT (OUTPATIENT)
Dept: CARDIOLOGY | Facility: HOSPITAL | Age: 45
End: 2025-07-03
Attending: INTERNAL MEDICINE
Payer: MEDICAID

## 2025-07-03 ENCOUNTER — TELEPHONE (OUTPATIENT)
Dept: CARDIOLOGY | Facility: HOSPITAL | Age: 45
End: 2025-07-03
Payer: MEDICAID

## 2025-07-03 DIAGNOSIS — I47.10 SVT (SUPRAVENTRICULAR TACHYCARDIA): ICD-10-CM

## 2025-07-03 LAB
OHS QRS DURATION: 152 MS
OHS QTC CALCULATION: 473 MS

## 2025-07-03 PROCEDURE — 93280 PM DEVICE PROGR EVAL DUAL: CPT

## 2025-07-03 PROCEDURE — 93010 ELECTROCARDIOGRAM REPORT: CPT | Mod: ,,, | Performed by: INTERNAL MEDICINE

## 2025-07-03 PROCEDURE — 93005 ELECTROCARDIOGRAM TRACING: CPT | Performed by: INTERNAL MEDICINE

## 2025-07-03 NOTE — TELEPHONE ENCOUNTER
Pt seen for wound check/device interrogation  Indication CHB    RA paces 2% LBBA paces 68%  Underlying SR with CHB    Checked LBBA lead in Unipolar and Bipolar.  Lead numbers acceptable    EKGs done in both Uni/bipolar in both high and low voltages.    Reviewed with Dr. Cueto.  RV lead is pacing Non-selective, more septal than LBB  No changes to settings.  Notify Dr. Phelps

## 2025-07-08 ENCOUNTER — TELEPHONE (OUTPATIENT)
Dept: CARDIOLOGY | Facility: CLINIC | Age: 45
End: 2025-07-08
Payer: MEDICAID

## 2025-07-08 DIAGNOSIS — Z95.0 CARDIAC PACEMAKER IN SITU: Primary | ICD-10-CM

## 2025-07-09 ENCOUNTER — HOSPITAL ENCOUNTER (OUTPATIENT)
Dept: RADIOLOGY | Facility: HOSPITAL | Age: 45
Discharge: HOME | End: 2025-07-09
Attending: INTERNAL MEDICINE
Payer: MEDICAID

## 2025-07-09 DIAGNOSIS — Z95.0 CARDIAC PACEMAKER IN SITU: ICD-10-CM

## 2025-07-09 PROCEDURE — 71046 X-RAY EXAM CHEST 2 VIEWS: CPT | Mod: TC,FY

## 2025-07-09 PROCEDURE — 71046 X-RAY EXAM CHEST 2 VIEWS: CPT | Mod: 26,,, | Performed by: RADIOLOGY

## 2025-07-14 ENCOUNTER — HOSPITAL ENCOUNTER (OUTPATIENT)
Dept: CARDIOLOGY | Facility: CLINIC | Age: 45
Discharge: HOME | End: 2025-07-14
Payer: MEDICAID

## 2025-07-14 DIAGNOSIS — I47.10 SVT (SUPRAVENTRICULAR TACHYCARDIA): ICD-10-CM

## 2025-07-14 LAB
OHS QRS DURATION: 92 MS
OHS QTC CALCULATION: 430 MS

## 2025-07-14 PROCEDURE — 93005 ELECTROCARDIOGRAM TRACING: CPT | Mod: PBBFAC | Performed by: INTERNAL MEDICINE

## 2025-07-14 PROCEDURE — 93010 ELECTROCARDIOGRAM REPORT: CPT | Mod: S$PBB,,, | Performed by: INTERNAL MEDICINE

## 2025-07-20 ENCOUNTER — PATIENT MESSAGE (OUTPATIENT)
Dept: CARDIOLOGY | Facility: CLINIC | Age: 45
End: 2025-07-20
Payer: MEDICAID

## 2025-07-22 ENCOUNTER — OFFICE VISIT (OUTPATIENT)
Facility: CLINIC | Age: 45
End: 2025-07-22
Payer: MEDICAID

## 2025-07-22 VITALS
HEART RATE: 91 BPM | DIASTOLIC BLOOD PRESSURE: 77 MMHG | TEMPERATURE: 98 F | OXYGEN SATURATION: 96 % | SYSTOLIC BLOOD PRESSURE: 113 MMHG | HEIGHT: 74 IN | WEIGHT: 211.81 LBS | BODY MASS INDEX: 27.18 KG/M2

## 2025-07-22 DIAGNOSIS — Z95.0 S/P PLACEMENT OF CARDIAC PACEMAKER: ICD-10-CM

## 2025-07-22 DIAGNOSIS — R59.9 LYMPH NODE ENLARGEMENT: Primary | ICD-10-CM

## 2025-07-22 DIAGNOSIS — I35.0 NONRHEUMATIC AORTIC VALVE STENOSIS: ICD-10-CM

## 2025-07-22 DIAGNOSIS — C81.9A HODGKIN'S DISEASE IN REMISSION, UNSPECIFIED HODGKIN LYMPHOMA TYPE: ICD-10-CM

## 2025-07-22 DIAGNOSIS — I44.2 COMPLETE HEART BLOCK: ICD-10-CM

## 2025-07-22 PROCEDURE — 99999 PR PBB SHADOW E&M-EST. PATIENT-LVL III: CPT | Mod: PBBFAC,,, | Performed by: INTERNAL MEDICINE

## 2025-07-22 PROCEDURE — 99215 OFFICE O/P EST HI 40 MIN: CPT | Mod: S$PBB,,, | Performed by: INTERNAL MEDICINE

## 2025-07-22 PROCEDURE — 99213 OFFICE O/P EST LOW 20 MIN: CPT | Mod: PBBFAC,PN | Performed by: INTERNAL MEDICINE

## 2025-07-22 PROCEDURE — 1159F MED LIST DOCD IN RCRD: CPT | Mod: CPTII,,, | Performed by: INTERNAL MEDICINE

## 2025-07-22 PROCEDURE — 3078F DIAST BP <80 MM HG: CPT | Mod: CPTII,,, | Performed by: INTERNAL MEDICINE

## 2025-07-22 PROCEDURE — 3008F BODY MASS INDEX DOCD: CPT | Mod: CPTII,,, | Performed by: INTERNAL MEDICINE

## 2025-07-22 PROCEDURE — G2211 COMPLEX E/M VISIT ADD ON: HCPCS | Mod: ,,, | Performed by: INTERNAL MEDICINE

## 2025-07-22 PROCEDURE — 1111F DSCHRG MED/CURRENT MED MERGE: CPT | Mod: CPTII,,, | Performed by: INTERNAL MEDICINE

## 2025-07-22 PROCEDURE — 3074F SYST BP LT 130 MM HG: CPT | Mod: CPTII,,, | Performed by: INTERNAL MEDICINE

## 2025-07-22 RX ORDER — LEVOTHYROXINE SODIUM 88 UG/1
88 TABLET ORAL
COMMUNITY
Start: 2025-07-02

## 2025-07-22 NOTE — PROGRESS NOTES
[facility] [address] Hematology Oncology In Office subsequent Encounter Note    7/22/25    Subjective:      Patient ID:   Michael Clayn  45 y.o. male  1980   MD Michael Muhamamd MD      Chief Complaint:    Enlarged Lymph node     HPI:He returns 7/22/25.  Last month, his HR down to 18.  He has SOB sx.  He had pacemaker placed.  No c/o now.        45 y.o. male was referred to myself per Dr. Michael MORLEY for the evaluation of an enlarged right inguinal lymph node that had been present for now for several years.  Patient denies fever, night sweats, weight loss.  He does not have B symptoms.  At age 17 he was diagnosed with Hodgkin's disease.  He had a large mediastinal mass.  Initial biopsy per Dr. Barry was negative for malignancy.  Thereafter he was referred to [facility] and had thoracotomy there and was found to have Hodgkin's disease.  He was treated with combination chemotherapy, I believe his regimen was ABVD x6 cycles.  His treatment was complicated by the development of chest pain symptoms and I believe pericarditis with the administration of Adriamycin.  His treatment was given to Oncology at [facility].  Thereafter he went to [facility] in [place] for his adjuvant radiation therapy to the chest.  He achieved complete remission status and was followed without lymphoma recurrence.    I had seen him at the time of his original diagnosis but did not participate in his care after he went to [facility] and [facility].  He had an office chart here however it has been destroyed after several years of inactivity on the case file.  The history I am giving above is based on my recollection and based on his information given to me today.    He lives in [place].  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    He does not smoke, he does not drink alcohol,  per the chart he is allergic to Cipro.  He tells me that he  does have mild allergy to iodine manifested with itching.    He has hypothyroidism and is on Synthroid.  This was acquired after he received previous chest irradiation.    His father has psoriasis.  His mother has valvular heart disease.  He has a sister and brother alive and well.  He does not have children.    LN Bx at R inguinal area was reactive LN, no cancer seen.  Hodgkin's Dx was around age 19-20, now age 43, 25 years since HD dx.  C/O nodule or mass at L inguinal area again.  Denies Fever, NS, decreased appetite or decreased weight.  No B sx.    U/S of inguinal area was negative.    PET scan was done at this time and was negative for residual, recurrent or malignant change.  It was a negative study.    He recently experienced dizziness, syncope, while traveling abroad.  On evaluating this further here in the [place] he has moderate aortic stenosis, possibly related to previous treatment with radiation for his lymphoma condition at age 17.  Dr. Kahn is monitoring him for this condition.    From my standpoint observation is the plan, he will follow-up here in 6 months, can cancel if doing okay and return to clinic here in 1 year for sure.      ROS:   GEN: normal without any fever, night sweats or weight loss  HEENT: normal with no HA's, sore throat, stiff neck, changes in vision  CV: See HPI  PULM: normal with no SOB, cough, hemoptysis, sputum or pleuritic pain  GI: normal with no abdominal pain, nausea, vomiting, constipation, diarrhea, melanotic stools, BRBPR, or hematemesis  : normal with no hematuria, dysuria  BREAST: normal with no mass, discharge, pain  SKIN: normal with no rash, erythema, bruising, or swelling     Past Medical History:   Diagnosis Date    Aortic stenosis     Atrial flutter 06/24/2025    Attention deficit hyperactivity disorder, combined type     Enlarged lymph node 10/2021    Hodgkin lymphoma 1998    Hypothyroidism     SVT (supraventricular tachycardia) 09/24/2024     Past Surgical  History:   Procedure Laterality Date    A-V CARDIAC PACEMAKER INSERTION N/A 6/26/2025    Procedure: INSERTION, CARDIAC PACEMAKER, DUAL CHAMBER;  Surgeon: Modesto Phelps MD;  Location: [facility] EP LAB;  Service: Cardiology;  Laterality: N/A;  AV block, dc PPM vs ICD, Biotronik, Anes, SK, TCVICU [id number] (pending MRI; likely dcPPM with LBAP)    ABLATION, ATRIAL FLUTTER, TYPICAL N/A 1/16/2025    Procedure: ABLATION, ATRIAL FLUTTER, TYPICAL;  Surgeon: Christian Cueto MD;  Location: [facility] EP LAB;  Service: Cardiology;  Laterality: N/A;  SVT, RFA, CARTO, MAC, GP, 3 Prep    BIOPSY OF INGUINAL LYMPH NODE Right 10/22/2021    Procedure: BIOPSY, LYMPH NODE, INGUINAL;  Surgeon: Trey Lora III, MD;  Location: [facility] OR;  Service: General;  Laterality: Right;  right inguinal lymph node biopsy    HERNIA REPAIR      as a child    RHINOPLASTY      TONSILLECTOMY      turmor removed from chest      also pericardium        Review of patient's allergies indicates:   Allergen Reactions    Ciprofloxacin Hives and Itching    Levaquin [levofloxacin] Swelling     And itching    Iodine and iodide containing products Itching     And IV contrast       Social History     Socioeconomic History    Marital status: Single   Tobacco Use    Smoking status: Never    Smokeless tobacco: Never   Substance and Sexual Activity    Alcohol use: Yes     Alcohol/week: 1.0 standard drink of alcohol     Types: 1 Glasses of wine per week     Comment: nightly    Drug use: Never     Social Drivers of Health     Financial Resource Strain: Low Risk  (6/26/2025)    Overall Financial Resource Strain (CARDIA)     Difficulty of Paying Living Expenses: Not hard at all   Food Insecurity: No Food Insecurity (6/26/2025)    Hunger Vital Sign     Worried About Running Out of Food in the Last Year: Never true     Ran Out of Food in the Last Year: Never true   Transportation Needs: No Transportation Needs (6/26/2025)    PRAPARE - Transportation     Lack of Transportation  "(Medical): No     Lack of Transportation (Non-Medical): No   Physical Activity: Sufficiently Active (6/26/2025)    Exercise Vital Sign     Days of Exercise per Week: 7 days     Minutes of Exercise per Session: 60 min   Stress: No Stress Concern Present (6/26/2025)    [employer] [employer] - Occupational Stress Questionnaire     Feeling of Stress : Not at all   Housing Stability: Low Risk  (6/26/2025)    Housing Stability Vital Sign     Unable to Pay for Housing in the Last Year: No     Homeless in the Last Year: No         Current Outpatient Medications:     atorvastatin (LIPITOR) 10 MG tablet, Take 1 tablet (10 mg total) by mouth once daily. (Patient taking differently: Take 10 mg by mouth once daily. States currently takes every two days), Disp: 90 tablet, Rfl: 3    dextroamphetamine-amphetamine (ADDERALL) 20 mg tablet, Take 1 tablet by mouth once daily., Disp: 30 tablet, Rfl: 0    levothyroxine (SYNTHROID) 88 MCG tablet, Take 88 mcg by mouth., Disp: , Rfl:     aspirin (ECOTRIN) 81 MG EC tablet, Take 1 tablet (81 mg total) by mouth once daily. (Patient not taking: Reported on 7/22/2025), Disp: 30 tablet, Rfl: 0    doxycycline (VIBRA-TABS) 100 MG tablet, Take 1 tablet (100 mg total) by mouth every 12 (twelve) hours. (Patient not taking: Reported on 7/22/2025), Disp: 10 tablet, Rfl: 0    hydrocortisone-pramoxine (PROCTOFOAM-HS) rectal foam, Place 1 applicator rectally 2 (two) times daily. (Patient not taking: Reported on 7/22/2025), Disp: 20 g, Rfl: 3          Objective:   Vitals:  Blood pressure 113/77, pulse 91, temperature 98.2 °F (36.8 °C), temperature source Temporal, height 6' 2" (1.88 m), weight 96.1 kg (211 lb 12.8 oz), SpO2 96%.    Physical Examination:   GEN: no apparent distress, comfortable  HEAD: atraumatic and normocephalic  EYES: no pallor, no icterus  ENT: no pharyngeal erythema, external ears WNL; no nasal discharge; no thrush  NECK: no masses, thyroid normal, trachea midline, no " LAD/LN's, supple  CV: RRR with no murmur; normal pulse  CHEST: Normal respiratory effort; CTAB; normal breath sounds; no wheeze or crackles  ABDOM: nontender and nondistended; soft;  no rebound/guarding, L/S NP  MUSC/Skeletal: ROM normal; no crepitus; joints normal; no deformities   EXTREM: no clubbing, cyanosis, inflammation or swelling.  He has varicosities noted at the R leg.  SKIN: no rashes, lesions, ulcers, petechiae   : Not examined on this visit.  He kept genitals covered and in place with gown  NEURO: grossly intact; motor/sensory WNL;  no tremors  PSYCH: normal mood, affect and behavior  LYMPH: normal cervical, supraclavicular, axillary and L groin LN's.  At the R inguinal area, he has a soft  movable 2 x 1 cm LN on exam, evident when he is standing.  No change since 7/2024.  Breasts: L & R no palpable mass    Re eval of LN status, PET scan 7/2024, he is out of town during the month of 6/2024.  If PET shows increased lymphadenopathy or if R inguinal LN is hypermetabolic, then proceed with LN Bx.      Labs:   Lab Results   Component Value Date    WBC 8.82 06/26/2025    HGB 15.0 06/26/2025    HCT 43.8 06/26/2025    MCV 89 06/26/2025     06/26/2025    CMP  Sodium   Date Value Ref Range Status   06/26/2025 139 136 - 145 mmol/L Final   01/09/2025 137 136 - 145 mmol/L Final     Potassium   Date Value Ref Range Status   06/26/2025 4.2 3.5 - 5.1 mmol/L Final   01/09/2025 4.4 3.5 - 5.1 mmol/L Final     Chloride   Date Value Ref Range Status   06/26/2025 105 95 - 110 mmol/L Final   01/09/2025 104 95 - 110 mmol/L Final     CO2   Date Value Ref Range Status   06/26/2025 24 23 - 29 mmol/L Final   01/09/2025 24 23 - 29 mmol/L Final     Glucose   Date Value Ref Range Status   06/26/2025 167 (H) 70 - 110 mg/dL Final   01/09/2025 96 70 - 110 mg/dL Final     BUN   Date Value Ref Range Status   06/26/2025 14 6 - 20 mg/dL Final     Creatinine   Date Value Ref Range Status   06/26/2025 1.0 0.5 - 1.4 mg/dL Final      Calcium   Date Value Ref Range Status   06/26/2025 8.3 (L) 8.7 - 10.5 mg/dL Final   01/09/2025 9.3 8.7 - 10.5 mg/dL Final     Protein Total   Date Value Ref Range Status   06/26/2025 6.4 6.0 - 8.4 gm/dL Final     Total Protein   Date Value Ref Range Status   07/10/2024 7.1 6.0 - 8.4 g/dL Final     Albumin   Date Value Ref Range Status   06/26/2025 3.6 3.5 - 5.2 g/dL Final   07/10/2024 4.6 3.5 - 5.2 g/dL Final     Total Bilirubin   Date Value Ref Range Status   07/10/2024 0.5 0.1 - 1.0 mg/dL Final     Comment:     For infants and newborns, interpretation of results should be based  on gestational age, weight and in agreement with clinical  observations.    Premature Infant recommended reference ranges:  Up to 24 hours.............<8.0 mg/dL  Up to 48 hours............<12.0 mg/dL  3-5 days..................<15.0 mg/dL  6-29 days.................<15.0 mg/dL       Bilirubin Total   Date Value Ref Range Status   06/26/2025 0.4 0.1 - 1.0 mg/dL Final     Comment:     For infants and newborns, interpretation of results should be based   on gestational age, weight and in agreement with clinical   observations.    Premature Infant recommended reference ranges:   0-24 hours:  <8.0 mg/dL   24-48 hours: <12.0 mg/dL   3-5 days:    <15.0 mg/dL   6-29 days:   <15.0 mg/dL     Alkaline Phosphatase   Date Value Ref Range Status   07/10/2024 52 (L) 55 - 135 U/L Final     ALP   Date Value Ref Range Status   06/26/2025 60 40 - 150 unit/L Final     AST   Date Value Ref Range Status   06/26/2025 13 11 - 45 unit/L Final   07/10/2024 15 10 - 40 U/L Final     ALT   Date Value Ref Range Status   06/26/2025 16 10 - 44 unit/L Final   07/10/2024 16 10 - 44 U/L Final     Anion Gap   Date Value Ref Range Status   06/26/2025 10 8 - 16 mmol/L Final     eGFR if    Date Value Ref Range Status   05/06/2022 117 > OR = 60 mL/min/1.73m2 Final     eGFR if non    Date Value Ref Range Status   05/06/2022 101 > OR = 60  mL/min/1.73m2 Final         Assessment:   (1) 45 y.o. male with Hodgkins Dx at age 17, treated with Rad Rx adjuvantly after  combination chemotherapy, achieving CR.    (2)Hx 2 cm soft movable R inguinal LN.  He has hx of R inguinal hernia repair at age 1.  He has been building a boat at home, moving 3 hulls.    (3)Adriamycin hx.  Ej Fx 53% 10/25/22.    (4)PET SATISH 10/21    (5)R inguinal LN Bx showed reactive LN, no cancer seen.    (6)He has another R inguinal LN on exam , 2 cm x's 1 cm on exam, evident on his standing.    (7) PET scan does not show hypermetabolism and is negative for malignant change.  July, 2024.    (8) status post syncope, moderate aortic stenosis, under evaluation per Dr. Khan.  S/P recent Pacemaker placement for bradycardia.    Return to clinic here in 6 months can cancel, return to clinic in 1 year.    Dillon Milan MD  [facility]  7/22/25

## 2025-07-30 ENCOUNTER — CLINICAL SUPPORT (OUTPATIENT)
Dept: CARDIOLOGY | Facility: HOSPITAL | Age: 45
End: 2025-07-30

## 2025-07-30 ENCOUNTER — CLINICAL SUPPORT (OUTPATIENT)
Dept: CARDIOLOGY | Facility: HOSPITAL | Age: 45
End: 2025-07-30
Attending: INTERNAL MEDICINE
Payer: MEDICAID

## 2025-07-30 DIAGNOSIS — Z95.0 PRESENCE OF CARDIAC PACEMAKER: ICD-10-CM

## 2025-07-30 DIAGNOSIS — I44.2 ATRIOVENTRICULAR BLOCK, COMPLETE: ICD-10-CM

## 2025-07-30 PROCEDURE — 93294 REM INTERROG EVL PM/LDLS PM: CPT | Mod: ,,, | Performed by: INTERNAL MEDICINE

## 2025-07-30 PROCEDURE — 93296 REM INTERROG EVL PM/IDS: CPT | Performed by: INTERNAL MEDICINE

## 2025-08-01 LAB
OHS CV AF BURDEN PERCENT: < 1
OHS CV DC REMOTE DEVICE TYPE: NORMAL
OHS CV RV PACING PERCENT: 6 %

## 2025-08-05 ENCOUNTER — TELEPHONE (OUTPATIENT)
Dept: FAMILY MEDICINE | Facility: CLINIC | Age: 45
End: 2025-08-05
Payer: MEDICAID

## 2025-08-05 NOTE — TELEPHONE ENCOUNTER
Copied from CRM #[id number]. Topic: Appointments - Appointment Scheduling  >> Aug 5, 2025  1:39 PM Med Assistant Mary Beth wrote:  Type:  Sooner Apoointment Request    Caller is requesting a sooner appointment.  Caller declined first available appointment listed below.  Caller will not accept being placed on the waitlist and is requesting a message be sent to doctor.  Name of Caller: Patient's father. Mr. Hart  When is the first available appointment?  Symptoms:general f/u   Would the patient rather a call back or a response via [facility]? Call back   Best Call Back Number:[phone]  Additional Information: Please call patients father to advise, thank you

## 2025-08-22 ENCOUNTER — LAB VISIT (OUTPATIENT)
Dept: LAB | Facility: HOSPITAL | Age: 45
End: 2025-08-22
Payer: MEDICAID

## 2025-08-22 ENCOUNTER — OFFICE VISIT (OUTPATIENT)
Dept: FAMILY MEDICINE | Facility: CLINIC | Age: 45
End: 2025-08-22
Payer: MEDICAID

## 2025-08-22 VITALS
WEIGHT: 217.81 LBS | HEIGHT: 74 IN | BODY MASS INDEX: 27.95 KG/M2 | SYSTOLIC BLOOD PRESSURE: 122 MMHG | DIASTOLIC BLOOD PRESSURE: 82 MMHG | OXYGEN SATURATION: 97 % | HEART RATE: 83 BPM | RESPIRATION RATE: 18 BRPM

## 2025-08-22 DIAGNOSIS — E78.00 HYPERCHOLESTEROLEMIA: ICD-10-CM

## 2025-08-22 DIAGNOSIS — R73.9 HYPERGLYCEMIA: ICD-10-CM

## 2025-08-22 DIAGNOSIS — F90.2 ATTENTION DEFICIT HYPERACTIVITY DISORDER, COMBINED TYPE: ICD-10-CM

## 2025-08-22 DIAGNOSIS — Z76.89 ENCOUNTER TO ESTABLISH CARE: Primary | ICD-10-CM

## 2025-08-22 DIAGNOSIS — E03.9 HYPOTHYROIDISM, UNSPECIFIED TYPE: ICD-10-CM

## 2025-08-22 LAB
CHOLEST SERPL-MCNC: 177 MG/DL (ref 120–199)
CHOLEST/HDLC SERPL: 5.2 {RATIO} (ref 2–5)
EAG (OHS): 103 MG/DL (ref 68–131)
HBA1C MFR BLD: 5.2 % (ref 4–5.6)
HDLC SERPL-MCNC: 34 MG/DL (ref 40–75)
HDLC SERPL: 19.2 % (ref 20–50)
LDLC SERPL CALC-MCNC: 103.4 MG/DL (ref 63–159)
NONHDLC SERPL-MCNC: 143 MG/DL
TRIGL SERPL-MCNC: 198 MG/DL (ref 30–150)
TSH SERPL-ACNC: 1.85 UIU/ML (ref 0.4–4)

## 2025-08-22 PROCEDURE — 80355 GABAPENTIN NON-BLOOD: CPT

## 2025-08-22 PROCEDURE — 84443 ASSAY THYROID STIM HORMONE: CPT

## 2025-08-22 PROCEDURE — 80061 LIPID PANEL: CPT

## 2025-08-22 PROCEDURE — 99213 OFFICE O/P EST LOW 20 MIN: CPT | Mod: PBBFAC,PO

## 2025-08-22 PROCEDURE — 83036 HEMOGLOBIN GLYCOSYLATED A1C: CPT

## 2025-08-22 PROCEDURE — 36415 COLL VENOUS BLD VENIPUNCTURE: CPT | Mod: PO

## 2025-08-22 PROCEDURE — 99999 PR PBB SHADOW E&M-EST. PATIENT-LVL III: CPT | Mod: PBBFAC,,,

## 2025-08-22 RX ORDER — DEXTROAMPHETAMINE SACCHARATE, AMPHETAMINE ASPARTATE, DEXTROAMPHETAMINE SULFATE AND AMPHETAMINE SULFATE 5; 5; 5; 5 MG/1; MG/1; MG/1; MG/1
20 TABLET ORAL DAILY
Qty: 30 TABLET | Refills: 0 | Status: SHIPPED | OUTPATIENT
Start: 2025-08-22 | End: 2025-09-21

## 2025-08-22 RX ORDER — DEXTROAMPHETAMINE SACCHARATE, AMPHETAMINE ASPARTATE, DEXTROAMPHETAMINE SULFATE AND AMPHETAMINE SULFATE 5; 5; 5; 5 MG/1; MG/1; MG/1; MG/1
1 TABLET ORAL DAILY
Qty: 30 TABLET | Refills: 0 | Status: CANCELLED | OUTPATIENT
Start: 2025-08-22

## 2025-08-22 RX ORDER — DEXTROAMPHETAMINE SACCHARATE, AMPHETAMINE ASPARTATE, DEXTROAMPHETAMINE SULFATE AND AMPHETAMINE SULFATE 5; 5; 5; 5 MG/1; MG/1; MG/1; MG/1
20 TABLET ORAL DAILY
Qty: 30 TABLET | Refills: 0 | Status: SHIPPED | OUTPATIENT
Start: 2025-10-23 | End: 2025-11-22

## 2025-08-22 RX ORDER — DEXTROAMPHETAMINE SACCHARATE, AMPHETAMINE ASPARTATE, DEXTROAMPHETAMINE SULFATE AND AMPHETAMINE SULFATE 5; 5; 5; 5 MG/1; MG/1; MG/1; MG/1
20 TABLET ORAL DAILY
Qty: 30 TABLET | Refills: 0 | Status: SHIPPED | OUTPATIENT
Start: 2025-09-22 | End: 2025-10-22

## 2025-08-27 LAB
1OH-MIDAZOLAM UR QL SCN: NOT DETECTED
6MAM UR QL: NOT DETECTED
7AMINOCLONAZEPAM UR QL: NOT DETECTED
A-OH ALPRAZ UR QL: NOT DETECTED
ALPRAZ UR QL: NOT DETECTED
AMPHET UR QL SCN: NOT DETECTED
ANNOTATION COMMENT IMP: NORMAL
AR NOROXYMORPHONE (CUTOFF 100 NG/ML): NOT DETECTED
AR OXYMORPHONE (CUTOFF 40 NG/ML): NOT DETECTED
BARBITURATES UR QL: NEGATIVE
BUPRENORPHINE UR QL: NOT DETECTED
BZE UR QL: NEGATIVE
CARBOXYTHC UR QL: NEGATIVE
CARISOPRODOL UR QL: NEGATIVE
CLONAZEPAM UR QL: NOT DETECTED
CODEINE UR QL: NOT DETECTED
CREAT UR-MCNC: 160.3 MG/DL
DIAZEPAM UR QL: NOT DETECTED
ETHYL GLUCURONIDE UR QL: NEGATIVE
FENTANYL UR QL: NOT DETECTED
GABAPENTIN UR QL CFM: NOT DETECTED
HYDROCODONE UR QL: NOT DETECTED
HYDROMORPHONE UR QL: NOT DETECTED
LORAZEPAM UR QL: NOT DETECTED
MDA UR QL: NOT DETECTED
MDEA UR QL: NOT DETECTED
MDMA UR QL: NOT DETECTED
ME-PHENIDATE UR QL: NOT DETECTED
METHADONE UR QL: NEGATIVE
METHAMPHET UR QL: NOT DETECTED
MIDAZOLAM UR QL SCN: NOT DETECTED
MORPHINE UR QL: NOT DETECTED
NALOXONE UR QL CFM: NOT DETECTED
NORBUPRENORPHINE UR QL CFM: NOT DETECTED
NORDIAZEPAM UR QL: NOT DETECTED
NORFENTANYL UR QL: NOT DETECTED
NORMEPERIDINE UR QL CFM: NOT DETECTED
NOROXYCODONE UR QL CFM: NOT DETECTED
NOROXYMORPHONE UR QL SCN: NOT DETECTED
OXAZEPAM UR QL: NOT DETECTED
OXYCODONE UR QL: NOT DETECTED
PATHOLOGY STUDY: NORMAL
PCP UR QL: NEGATIVE
PHENTERMINE UR QL: NOT DETECTED
PREGABALIN UR QL CFM: NOT DETECTED
SERVICE CMNT-IMP: NORMAL
TAPENTADOL UR QL SCN: NOT DETECTED
TAPENTADOL UR QL SCN: NOT DETECTED
TEMAZEPAM UR QL: NOT DETECTED
TRAMADOL UR QL: NEGATIVE
ZOLPIDEM PHENYL-4-CARB UR QL SCN: NOT DETECTED
ZOLPIDEM UR QL: NOT DETECTED

## (undated) DEVICE — INTRODUCER HEMOSTASIS 7.5F

## (undated) DEVICE — SHEATH HEMOSTASIS 8.5FR

## (undated) DEVICE — PAD DEFIB CADENCE ADULT R2

## (undated) DEVICE — KIT PROBE COVER WITH GEL

## (undated) DEVICE — SYRINGE HYPODERMC LL 22G 1.5 ECLIPSE 30

## (undated) DEVICE — SHEATH SELECTRA 65MM 42CM

## (undated) DEVICE — WIRE GUIDE WHOLEY MOD J .035

## (undated) DEVICE — PATCH CARTO REFERENCE

## (undated) DEVICE — DISSECTOR EXACT LIGASURE 20.6MM-21CM

## (undated) DEVICE — COVER INSTR ELASTIC BAND 40X20

## (undated) DEVICE — R CATH BIDIRECTIONL DF CRV 7FR

## (undated) DEVICE — SUTURE MONOCRYL 4-0 PS-2 27 MCP426H

## (undated) DEVICE — R CATH RESPONS QPLR JSN 6F 120

## (undated) DEVICE — UNDERGLOVE BIOGEL PI MICRO BLUE SZ 8

## (undated) DEVICE — SHEATH PRELUDE 9X13CM SNAP

## (undated) DEVICE — SHEATH SELECTRA 50MM 42CM

## (undated) DEVICE — CATH DS NAV THERMOCOUPLE 7FR

## (undated) DEVICE — SUTURE VICRYL 4-0 18 VCP773D

## (undated) DEVICE — SNAP CAP 18 DOME COVERS

## (undated) DEVICE — CATH TRICUSPID HALO XP 7FRX110

## (undated) DEVICE — KIT SELECTRA ACCESSORY

## (undated) DEVICE — TRAY GENERAL SURGERY

## (undated) DEVICE — SLING SWATHE UNIVERSAL FOAM

## (undated) DEVICE — DRAPE INCISE IOBAN 2 23X17IN

## (undated) DEVICE — SOLUTION IRRI NS BOTTLE 1000ML R5200-01

## (undated) DEVICE — PACK PACER PERMANENT OMC

## (undated) DEVICE — GLOVE BIOGEL MICRO SURGEON PINK SZ 7.5

## (undated) DEVICE — SHEATH SAFESHEATH II ULTRA 6FR

## (undated) DEVICE — SUTURE SILK 3-0 18 A184H

## (undated) DEVICE — DRAPE STERI INCISE 17X23IN

## (undated) DEVICE — SUTURE MONOCRYL 4-0 27 SH MCP415H

## (undated) DEVICE — INTRO 8.5FR 63CM SRO

## (undated) DEVICE — SUTURE SILK 2-0 18 A185H

## (undated) DEVICE — SUT VICRYL 3-0 27 SH

## (undated) DEVICE — PACK EP DRAPE OMC

## (undated) DEVICE — ADHESIVE TISSUE EXOFIN

## (undated) DEVICE — ELECTRODE REM PLYHSV RETURN 9

## (undated) DEVICE — PAD BOVIE ADULT